# Patient Record
Sex: FEMALE | Race: WHITE | Employment: FULL TIME | URBAN - METROPOLITAN AREA
[De-identification: names, ages, dates, MRNs, and addresses within clinical notes are randomized per-mention and may not be internally consistent; named-entity substitution may affect disease eponyms.]

---

## 2018-01-05 ENCOUNTER — HOSPITAL ENCOUNTER (EMERGENCY)
Facility: HOSPITAL | Age: 78
Discharge: HOME | End: 2018-01-06
Payer: MEDICARE

## 2018-01-05 ENCOUNTER — APPOINTMENT (OUTPATIENT)
Dept: GENERAL RADIOLOGY | Facility: HOSPITAL | Age: 78
End: 2018-01-05
Payer: MEDICARE

## 2018-01-05 VITALS
RESPIRATION RATE: 18 BRPM | SYSTOLIC BLOOD PRESSURE: 109 MMHG | HEIGHT: 65 IN | BODY MASS INDEX: 23.49 KG/M2 | HEART RATE: 81 BPM | WEIGHT: 141 LBS | TEMPERATURE: 99 F | DIASTOLIC BLOOD PRESSURE: 61 MMHG | OXYGEN SATURATION: 99 %

## 2018-01-05 DIAGNOSIS — J11.1 INFLUENZA: Primary | ICD-10-CM

## 2018-01-05 LAB
ANION GAP SERPL CALC-SCNC: 10 MMOL/L (ref 0–18)
BASOPHILS # BLD: 0 K/UL (ref 0–0.2)
BASOPHILS NFR BLD: 0 %
BUN SERPL-MCNC: 20 MG/DL (ref 8–20)
BUN/CREAT SERPL: 21.3 (ref 10–20)
CALCIUM SERPL-MCNC: 8.9 MG/DL (ref 8.5–10.5)
CHLORIDE SERPL-SCNC: 105 MMOL/L (ref 95–110)
CO2 SERPL-SCNC: 21 MMOL/L (ref 22–32)
CREAT SERPL-MCNC: 0.94 MG/DL (ref 0.5–1.5)
EOSINOPHIL # BLD: 0 K/UL (ref 0–0.7)
EOSINOPHIL NFR BLD: 0 %
ERYTHROCYTE [DISTWIDTH] IN BLOOD BY AUTOMATED COUNT: 13.4 % (ref 11–15)
FLUAV + FLUBV RNA SPEC NAA+PROBE: NEGATIVE
FLUAV + FLUBV RNA SPEC NAA+PROBE: NEGATIVE
FLUAV + FLUBV RNA SPEC NAA+PROBE: POSITIVE
GLUCOSE SERPL-MCNC: 114 MG/DL (ref 70–99)
HCT VFR BLD AUTO: 36.3 % (ref 35–48)
HGB BLD-MCNC: 12.1 G/DL (ref 12–16)
LYMPHOCYTES # BLD: 0.9 K/UL (ref 1–4)
LYMPHOCYTES NFR BLD: 10 %
MCH RBC QN AUTO: 32.7 PG (ref 27–32)
MCHC RBC AUTO-ENTMCNC: 33.4 G/DL (ref 32–37)
MCV RBC AUTO: 97.7 FL (ref 80–100)
MONOCYTES # BLD: 0.9 K/UL (ref 0–1)
MONOCYTES NFR BLD: 10 %
NEUTROPHILS # BLD AUTO: 7.5 K/UL (ref 1.8–7.7)
NEUTROPHILS NFR BLD: 80 %
OSMOLALITY UR CALC.SUM OF ELEC: 285 MOSM/KG (ref 275–295)
PLATELET # BLD AUTO: 199 K/UL (ref 140–400)
PMV BLD AUTO: 7.2 FL (ref 7.4–10.3)
POTASSIUM SERPL-SCNC: 3.6 MMOL/L (ref 3.3–5.1)
RBC # BLD AUTO: 3.71 M/UL (ref 3.7–5.4)
SODIUM SERPL-SCNC: 136 MMOL/L (ref 136–144)
WBC # BLD AUTO: 9.3 K/UL (ref 4–11)

## 2018-01-05 PROCEDURE — 87631 RESP VIRUS 3-5 TARGETS: CPT | Performed by: EMERGENCY MEDICINE

## 2018-01-05 PROCEDURE — 96360 HYDRATION IV INFUSION INIT: CPT

## 2018-01-05 PROCEDURE — 99284 EMERGENCY DEPT VISIT MOD MDM: CPT

## 2018-01-05 PROCEDURE — 80048 BASIC METABOLIC PNL TOTAL CA: CPT | Performed by: EMERGENCY MEDICINE

## 2018-01-05 PROCEDURE — 71046 X-RAY EXAM CHEST 2 VIEWS: CPT

## 2018-01-05 PROCEDURE — 85025 COMPLETE CBC W/AUTO DIFF WBC: CPT | Performed by: EMERGENCY MEDICINE

## 2018-01-05 RX ORDER — ACETAMINOPHEN 500 MG
1000 TABLET ORAL ONCE
Status: COMPLETED | OUTPATIENT
Start: 2018-01-05 | End: 2018-01-05

## 2018-01-05 RX ORDER — ACETAMINOPHEN 500 MG
TABLET ORAL
Status: COMPLETED
Start: 2018-01-05 | End: 2018-01-05

## 2018-01-06 NOTE — ED PROVIDER NOTES
Patient Seen in: [facility] AND [facility] Emergency Department    History   Patient presents with:  Fever    Stated Complaint: Flu Like Symptoms    HPI    68 yo female with 10 days of URI symptoms with head congestion, sinus drainage and cough but no fever.  Ramakrishna Miranda sounds and intact distal pulses. Pulmonary/Chest: Effort normal and breath sounds normal.   Abdominal: Soft. Bowel sounds are normal. She exhibits no distension and no mass. There is no tenderness. There is no rebound and no guarding.    Musculoskeletal: radiology. No pneumonia. Labs reviewed. Influenza B positive. No treatment as patient has had symptoms for 10 days.          Disposition and Plan     Clinical Impression:  Influenza  (primary encounter diagnosis)    Disposition:  Discharge  1/6/2018 12:

## 2018-01-08 ENCOUNTER — OFFICE VISIT (OUTPATIENT)
Dept: INTERNAL MEDICINE CLINIC | Facility: CLINIC | Age: 78
End: 2018-01-08

## 2018-01-08 VITALS
TEMPERATURE: 99 F | RESPIRATION RATE: 18 BRPM | HEART RATE: 112 BPM | DIASTOLIC BLOOD PRESSURE: 82 MMHG | BODY MASS INDEX: 24.77 KG/M2 | WEIGHT: 146.88 LBS | HEIGHT: 64.5 IN | SYSTOLIC BLOOD PRESSURE: 136 MMHG

## 2018-01-08 DIAGNOSIS — J11.1 INFLUENZA: Primary | ICD-10-CM

## 2018-01-08 PROCEDURE — 99213 OFFICE O/P EST LOW 20 MIN: CPT | Performed by: INTERNAL MEDICINE

## 2018-01-08 PROCEDURE — G0463 HOSPITAL OUTPT CLINIC VISIT: HCPCS | Performed by: INTERNAL MEDICINE

## 2018-01-08 NOTE — PROGRESS NOTES
HPI:    Patient ID: Gerardo Huang is a 69year old female. HPI  Patient is here for follow-up on influenza. I have not seen her here for about 3 years. She was in the emergency room on January 5.   She had 10 days of upper respiratory cough and cold sym not bruise/bleed easily. Psychiatric/Behavioral: Negative for depressed mood. The patient is not nervous/anxious. Current Outpatient Prescriptions:  triamcinolone acetonide (KENALOG) 0.1 % Apply Externally Cream Apply  topically.  Disp:  Rfl: checked by nurse but back to normal when checked by physician.       Meds This Visit:  No prescriptions requested or ordered in this encounter    Imaging & Referrals:  None         GB#[id number]

## 2018-03-15 ENCOUNTER — APPOINTMENT (OUTPATIENT)
Dept: CT IMAGING | Facility: HOSPITAL | Age: 78
End: 2018-03-15
Payer: MEDICARE

## 2018-03-15 ENCOUNTER — APPOINTMENT (OUTPATIENT)
Dept: GENERAL RADIOLOGY | Facility: HOSPITAL | Age: 78
End: 2018-03-15
Payer: MEDICARE

## 2018-03-15 ENCOUNTER — HOSPITAL ENCOUNTER (EMERGENCY)
Facility: HOSPITAL | Age: 78
Discharge: HOME | End: 2018-03-15
Payer: MEDICARE

## 2018-03-15 VITALS
BODY MASS INDEX: 25 KG/M2 | OXYGEN SATURATION: 99 % | TEMPERATURE: 99 F | SYSTOLIC BLOOD PRESSURE: 144 MMHG | DIASTOLIC BLOOD PRESSURE: 93 MMHG | HEART RATE: 96 BPM | RESPIRATION RATE: 16 BRPM | WEIGHT: 146.81 LBS

## 2018-03-15 DIAGNOSIS — S02.2XXA CLOSED FRACTURE OF NASAL BONE, INITIAL ENCOUNTER: ICD-10-CM
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
## 2018-03-15 DIAGNOSIS — S09.90XA INJURY OF HEAD, INITIAL ENCOUNTER: Primary | ICD-10-CM

## 2018-03-15 PROCEDURE — 73110 X-RAY EXAM OF WRIST: CPT

## 2018-03-15 PROCEDURE — 70450 CT HEAD/BRAIN W/O DYE: CPT

## 2018-03-15 PROCEDURE — 99284 EMERGENCY DEPT VISIT MOD MDM: CPT

## 2018-03-15 PROCEDURE — 70486 CT MAXILLOFACIAL W/O DYE: CPT

## 2018-03-15 NOTE — ED INITIAL ASSESSMENT (HPI)
Pt here for eval s/p mechanical fall. Bruising to right eye, cheek. c/o right wrist pain. Denies loc, denies blood thinner use.

## 2018-03-16 NOTE — ED PROVIDER NOTES
Patient Seen in: [facility] AND [facility] Emergency Department    History   Patient presents with:  Head Neck Injury (neurologic, musculoskeletal)      HPI    Patient presents to the ED after mechanical fall.   She states that she tripped over a crack in her gar Device: None (Room air) [03/15/18 1814]    Physical Exam   Constitutional: She is oriented to person, place, and time. She appears well-developed and well-nourished. No distress.    HENT:   Nose: Nose normal.   Significant contusions, swelling and ecchymosi the anterior and posterior intracranial circulations. 4. Lesser incidental findings as above. Ct Facial Bones (cpt=70486)    Result Date: 3/15/2018  CONCLUSION:  1. Nondisplaced left nasal bone/nasal tip fracture.   2. Anterior periorbital soft tis fracture of nasal bone, initial encounter    Disposition:  Discharge    Follow-up:  Tracie Sharma MD  [address]  [phone]    Schedule an appointment as soon as possible for a visit in 3 days        Medications Prescribe

## 2018-03-16 NOTE — ED NOTES
PT safe to DC home per MD. Melinda Brar to dress self. DC teaching done, pt verbalizes understanding. Ambulatory with steady gait to exit with family.

## 2018-03-22 ENCOUNTER — OFFICE VISIT (OUTPATIENT)
Dept: INTERNAL MEDICINE CLINIC | Facility: CLINIC | Age: 78
End: 2018-03-22

## 2018-03-22 VITALS
HEIGHT: 64.5 IN | BODY MASS INDEX: 24.96 KG/M2 | WEIGHT: 148 LBS | DIASTOLIC BLOOD PRESSURE: 70 MMHG | HEART RATE: 97 BPM | SYSTOLIC BLOOD PRESSURE: 142 MMHG
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
## 2018-03-22 PROCEDURE — 99212 OFFICE O/P EST SF 10 MIN: CPT | Performed by: PHYSICIAN ASSISTANT

## 2018-03-22 PROCEDURE — G0463 HOSPITAL OUTPT CLINIC VISIT: HCPCS | Performed by: PHYSICIAN ASSISTANT

## 2018-03-22 NOTE — PROGRESS NOTES
HPI:    Patient ID: Alfreda Shane is a 69year old female. HPI   Patient presents for follow-up from emergency room. She was seen on 3/15/18 after sustaining a mechanical fall in her garage at home.   States that she tripped over a crack in the floor of Comment:Other reaction(s): Shakiness  Ciprofloxacin               Comment:Other reaction(s): Rash  Erythromycin                Comment:Other reaction(s): Shakiness  Sulfamethoxazole            Comment:Other reaction(s): lip and tongue swe

## 2018-04-26 ENCOUNTER — LAB ENCOUNTER (OUTPATIENT)
Dept: LAB | Facility: HOSPITAL | Age: 78
End: 2018-04-26
Attending: INTERNAL MEDICINE
Payer: MEDICARE

## 2018-04-26 ENCOUNTER — OFFICE VISIT (OUTPATIENT)
Dept: INTERNAL MEDICINE CLINIC | Facility: CLINIC | Age: 78
End: 2018-04-26

## 2018-04-26 VITALS
RESPIRATION RATE: 18 BRPM | SYSTOLIC BLOOD PRESSURE: 162 MMHG | HEART RATE: 90 BPM | TEMPERATURE: 98 F | WEIGHT: 145.38 LBS | DIASTOLIC BLOOD PRESSURE: 96 MMHG | HEIGHT: 64 IN | BODY MASS INDEX: 24.82 KG/M2

## 2018-04-26 DIAGNOSIS — R32 URINARY INCONTINENCE, UNSPECIFIED TYPE: ICD-10-CM

## 2018-04-26 DIAGNOSIS — R73.09 ELEVATED GLUCOSE: ICD-10-CM

## 2018-04-26 DIAGNOSIS — I10 ESSENTIAL HYPERTENSION: ICD-10-CM

## 2018-04-26 DIAGNOSIS — Z00.00 ENCOUNTER FOR ANNUAL HEALTH EXAMINATION: Primary | ICD-10-CM

## 2018-04-26 DIAGNOSIS — Z12.31 ENCOUNTER FOR SCREENING MAMMOGRAM FOR BREAST CANCER: ICD-10-CM

## 2018-04-26 DIAGNOSIS — Z23 NEED FOR VACCINATION: ICD-10-CM

## 2018-04-26 DIAGNOSIS — Z78.0 POST-MENOPAUSE: ICD-10-CM

## 2018-04-26 PROCEDURE — G0009 ADMIN PNEUMOCOCCAL VACCINE: HCPCS | Performed by: INTERNAL MEDICINE

## 2018-04-26 PROCEDURE — 84443 ASSAY THYROID STIM HORMONE: CPT

## 2018-04-26 PROCEDURE — 82607 VITAMIN B-12: CPT

## 2018-04-26 PROCEDURE — 80053 COMPREHEN METABOLIC PANEL: CPT

## 2018-04-26 PROCEDURE — 85025 COMPLETE CBC W/AUTO DIFF WBC: CPT

## 2018-04-26 PROCEDURE — G0438 PPPS, INITIAL VISIT: HCPCS | Performed by: INTERNAL MEDICINE

## 2018-04-26 PROCEDURE — 36415 COLL VENOUS BLD VENIPUNCTURE: CPT

## 2018-04-26 PROCEDURE — 80061 LIPID PANEL: CPT

## 2018-04-26 PROCEDURE — 90670 PCV13 VACCINE IM: CPT | Performed by: INTERNAL MEDICINE

## 2018-04-26 NOTE — PROGRESS NOTES
HPI:   Griselda Quezada is a 69year old female who presents for a Medicare Initial Annual Wellness visit (Once after 12 month Medicare anniversary) . Patient is here requesting Medicare annual wellness visit and follow-up on chronic medical issues.   Over the LAST 2 WEEKS   Little interest or pleasure in doing things (over the last two weeks)?: Not at all  Feeling down, depressed, or hopeless (over the last two weeks)?: Not at all  PHQ-2 SCORE: 0     Advanced Directive:   She does NOT have a Living Will on (urinary tract infection) (2011). She  has a past surgical history that includes appendectomy (1994); hysterectomy (1994); and oophorectomy (1994). Her family history includes Diabetes in her father.    SOCIAL HISTORY:   She  reports that she has neve hearing conversations in a noisy background such as a crowded room or restaurant:  No   I get confused about where sounds come from:  No I misunderstand some words in a sentence and need to ask people to repeat themselves:  No   I especially have trouble u warm and dry. No rash noted. Psychiatric: She has a normal mood and affect. Her behavior is normal. Judgment and thought content normal.        Vaccination History     There is no immunization history on file for this patient.      ASSESSMENT AND OTHER RE elevated. (Z23) Need for vaccination  Plan: PNEUMOCOCCAL VACC, 13 KENDRICK IM         Vaccination status reviewed. Given Prevnar 13. We will give the Pneumovax shot in 1 year. She can get the shingles shot at the pharmacy.         Diet assessment: good found.    Bone Density Screening      Dexascan Every two years No results found for this or any previous visit. No flowsheet data found.     Pap and Pelvic      Pap: Every 3 yrs age 21-65 or Pap+HPV every 5 yrs age 33-67, age 72 and older at high risk There

## 2018-04-26 NOTE — PATIENT INSTRUCTIONS
Shiv Frausto's SCREENING SCHEDULE   Tests on this list are recommended by your physician but may not be covered, or covered at this frequency, by your insurer. Please check with your insurance carrier before scheduling to verify coverage.    PREVENTATIVE Flex Sigmoidoscopy Screen  Covered every 5 years No results found for this or any previous visit. No flowsheet data found. Fecal Occult Blood   Covered Annually No results found for: FOB, OCCULTSTOOL No flowsheet data found.      Barium Enema-   uncomfo after your 65th birthday    Hepatitis B for Moderate/High Risk       No orders found for this or any previous visit.  Medium/high risk factors:   End-stage renal disease   Hemophiliacs who received Factor VIII or IX concentrates   Clients of institutions fo

## 2018-05-10 ENCOUNTER — OFFICE VISIT (OUTPATIENT)
Dept: OPTOMETRY | Facility: CLINIC | Age: 78
End: 2018-05-10

## 2018-05-10 DIAGNOSIS — H52.4 PRESBYOPIA: ICD-10-CM

## 2018-05-10 DIAGNOSIS — H25.13 AGE-RELATED NUCLEAR CATARACT OF BOTH EYES: Primary | ICD-10-CM

## 2018-05-10 PROCEDURE — 92004 COMPRE OPH EXAM NEW PT 1/>: CPT | Performed by: OPTOMETRIST

## 2018-05-10 NOTE — PROGRESS NOTES
Arnold Dewey is a 69year old female. HPI:     HPI     Patient is in for an annual eye exam. Patient wears only OTC reading glasses and wants to make sure she is going to pass her test at the [facility] without distance glasses.  Used to wear gas permeable cont (Snellen - Linear)       Right Left    Dist sc 20/30 20/40    Near cc 4pt 4pt    Correction:  Glasses          Tonometry (Non-contact air puff, 2:50 PM)       Right Left    Pressure 16 18          Pupils       Pupils    Right PERRL    Left PERRL          V this encounter     Follow up instructions:  Return in about 1 year (around 5/10/2019) for Cataract Evaluation, Dilated exam.    5/10/2018  Scribed by: Trevor Bee

## 2018-05-10 NOTE — PATIENT INSTRUCTIONS
Age-related nuclear cataract of both eyes  No treatment is required. Will continue to observe. Presbyopia  Patient can still continue with +2.50 OTC reading glasses. No distance RX is needed.

## 2018-05-11 ENCOUNTER — APPOINTMENT (OUTPATIENT)
Dept: CT IMAGING | Facility: HOSPITAL | Age: 78
End: 2018-05-11
Attending: EMERGENCY MEDICINE
Payer: MEDICARE

## 2018-05-11 ENCOUNTER — HOSPITAL ENCOUNTER (EMERGENCY)
Facility: HOSPITAL | Age: 78
Discharge: HOME | End: 2018-05-11
Attending: EMERGENCY MEDICINE
Payer: MEDICARE

## 2018-05-11 VITALS
TEMPERATURE: 98 F | OXYGEN SATURATION: 99 % | HEIGHT: 64 IN | HEART RATE: 90 BPM | SYSTOLIC BLOOD PRESSURE: 153 MMHG | WEIGHT: 145 LBS | RESPIRATION RATE: 20 BRPM | DIASTOLIC BLOOD PRESSURE: 86 MMHG | BODY MASS INDEX: 24.75 KG/M2

## 2018-05-11 DIAGNOSIS — S00.81XA ABRASION OF FACE, INITIAL ENCOUNTER: ICD-10-CM

## 2018-05-11 DIAGNOSIS — S00.11XA CONTUSION OF RIGHT EYELID, INITIAL ENCOUNTER: Primary | ICD-10-CM

## 2018-05-11 DIAGNOSIS — S01.511A LIP LACERATION, INITIAL ENCOUNTER: ICD-10-CM

## 2018-05-11 PROCEDURE — 70450 CT HEAD/BRAIN W/O DYE: CPT | Performed by: EMERGENCY MEDICINE

## 2018-05-11 PROCEDURE — 99284 EMERGENCY DEPT VISIT MOD MDM: CPT

## 2018-05-11 PROCEDURE — 90471 IMMUNIZATION ADMIN: CPT

## 2018-05-11 NOTE — ED INITIAL ASSESSMENT (HPI)
Pt reports lifting boxes around the garage and fell and hit her nose/face. Lacerations noted to forehead and nose, bleeding controlled. Denies vision changes, neck pain, headaches, or blood thinner use.

## 2018-05-11 NOTE — ED PROVIDER NOTES
Patient Seen in: [facility] AND [facility] Emergency Department    History   Patient presents with:  Fall (musculoskeletal, neurologic)  Laceration Abrasion (integumentary)    Stated Complaint: fall, lac to face    HPI    Patient here with complaint of head inju PULSE OX Nl on room air    General: sts around r eye, superficial abrasions to r eyebrow, aabrasion just below nose small 1 cm, mid upper inner lip with 4 mm superficial laceration, no dental injury or laxity, no misalignment  Head: see above evidence of AM

## 2018-05-11 NOTE — ED NOTES
Report from [facility], pt stable in room during time of report then taken to CT.  Will continue to monitor upon return

## 2018-05-17 ENCOUNTER — HOSPITAL ENCOUNTER (OUTPATIENT)
Dept: MAMMOGRAPHY | Facility: HOSPITAL | Age: 78
Discharge: HOME | End: 2018-05-17
Attending: INTERNAL MEDICINE
Payer: MEDICARE

## 2018-05-17 ENCOUNTER — HOSPITAL ENCOUNTER (OUTPATIENT)
Dept: BONE DENSITY | Facility: HOSPITAL | Age: 78
Discharge: HOME | End: 2018-05-17
Attending: INTERNAL MEDICINE
Payer: MEDICARE

## 2018-05-17 DIAGNOSIS — Z12.31 ENCOUNTER FOR SCREENING MAMMOGRAM FOR BREAST CANCER: ICD-10-CM

## 2018-05-17 DIAGNOSIS — Z78.0 POST-MENOPAUSE: ICD-10-CM

## 2018-05-17 PROCEDURE — 77080 DXA BONE DENSITY AXIAL: CPT | Performed by: INTERNAL MEDICINE

## 2018-05-17 PROCEDURE — 77067 SCR MAMMO BI INCL CAD: CPT | Performed by: INTERNAL MEDICINE

## 2018-05-24 ENCOUNTER — OFFICE VISIT (OUTPATIENT)
Dept: PHYSICAL THERAPY | Facility: HOSPITAL | Age: 78
End: 2018-05-24
Attending: INTERNAL MEDICINE
Payer: MEDICARE

## 2018-05-24 DIAGNOSIS — R32 URINARY INCONTINENCE, UNSPECIFIED TYPE: ICD-10-CM

## 2018-05-24 PROCEDURE — 97140 MANUAL THERAPY 1/> REGIONS: CPT

## 2018-05-24 PROCEDURE — 97535 SELF CARE MNGMENT TRAINING: CPT

## 2018-05-24 PROCEDURE — 97161 PT EVAL LOW COMPLEX 20 MIN: CPT

## 2018-05-24 NOTE — PROGRESS NOTES
MUSCULOSKELETAL AND PELVIC FLOOR EVALUATION:   Referring Physician: Dr. Belle Gupta  Diagnosis: Urinary incontinence, unspecified type (R32)  Date of Onset: many years  Date of Service: 5/24/2018     PATIENT SUMMARY   Nalini Gray is a 69year old y/o femal getting in/out of car/chair/bed if urge is present. Denies any bowel dysfunction nor perineal pain. States she has never been sexually active, but has never had pain with an OBGYN exam. Reports she is wearing 2 pads/day, and wearing them 24/7.  Reports leak Smoker no    Drinker no    Diabetes no    Hypertension/Hyperlipidemia/Hypercholesterinemia no    Thyroid no      OBSTETRIC/GYNECOLOGIC HISTORY  : 0  Para: 0  Hx of hysterectomy (oophorectomy)   Menarche: 14  Last menstrual period:      URI Abdominals: NT/5       FLEXIBILITY/PALPATION  Muscle Left Right Comments   Iliopsoas WNL WNL    Hamstrings Mild restriction Mild restriction    Piriformis WNL WNL    Adductors WNL WNL    Scars Location/Surgery: N/A     Other   N/A     SPECIAL TESTS  All See below     Today’s Treatment and Response:  Patient education provided on female pelvic floor anatomy via pelvic floor model/illustrations, bowel/bladder/sexual function/health, adequate hydration levels, proper toileting habits/posture, bladder normati DPT    [de-identified] certification required: Yes  I certify the need for these services furnished under this plan of treatment and while under my care.     X___________________________________________________ Date____________________

## 2018-06-07 ENCOUNTER — OFFICE VISIT (OUTPATIENT)
Dept: PHYSICAL THERAPY | Facility: HOSPITAL | Age: 78
End: 2018-06-07
Attending: INTERNAL MEDICINE
Payer: MEDICARE

## 2018-06-07 ENCOUNTER — OFFICE VISIT (OUTPATIENT)
Dept: INTERNAL MEDICINE CLINIC | Facility: CLINIC | Age: 78
End: 2018-06-07

## 2018-06-07 VITALS
SYSTOLIC BLOOD PRESSURE: 148 MMHG | RESPIRATION RATE: 18 BRPM | BODY MASS INDEX: 24.82 KG/M2 | WEIGHT: 145.38 LBS | TEMPERATURE: 98 F | HEART RATE: 86 BPM | DIASTOLIC BLOOD PRESSURE: 84 MMHG | HEIGHT: 64 IN

## 2018-06-07 DIAGNOSIS — M85.80 OSTEOPENIA, UNSPECIFIED LOCATION: ICD-10-CM
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
## 2018-06-07 DIAGNOSIS — I10 ESSENTIAL HYPERTENSION: Primary | ICD-10-CM

## 2018-06-07 DIAGNOSIS — R32 URINARY INCONTINENCE, UNSPECIFIED TYPE: ICD-10-CM

## 2018-06-07 PROCEDURE — G0463 HOSPITAL OUTPT CLINIC VISIT: HCPCS | Performed by: INTERNAL MEDICINE

## 2018-06-07 PROCEDURE — 99214 OFFICE O/P EST MOD 30 MIN: CPT | Performed by: INTERNAL MEDICINE

## 2018-06-07 PROCEDURE — 97535 SELF CARE MNGMENT TRAINING: CPT

## 2018-06-07 PROCEDURE — 97112 NEUROMUSCULAR REEDUCATION: CPT

## 2018-06-07 NOTE — PROGRESS NOTES
Dx: Urinary incontinence, unspecified type (R32)        Authorized # of Visits/Insurance:  Medicare  Script Dates:  5/24/18 - 7/19/18           Next MD visit: none scheduled  Referring MD: Mickie Obrien  Fall Risk:  standard         Precautions: n/a 40 min  Therapist: Esau Monday PT, DPT    Long Term Goals Timeframe (8 weeks, 5/24/18 - 7/19/18)  1. Patient to be independent with progressive HEP during and upon discharge to aide with symptom management and return to previous level of function.    2.  P

## 2018-06-07 NOTE — PROGRESS NOTES
HPI:    Patient ID: Ramón Ray is a 69year old female. HPI  Patient is here for follow-up on elevated blood pressure as well as urinary incontinence and osteopenia. I last saw her on April 26. That was for Medicare annual wellness visit.   Mammogram Cardiovascular: Negative for chest pain and palpitations. Gastrointestinal: Negative for nausea, vomiting, abdominal pain, diarrhea and constipation.    Genitourinary: Negative for dysuria, hematuria, vaginal discharge, menstrual problem and sexual dys Essential hypertension  (primary encounter diagnosis)  Plan: Blood pressure is a little bit borderline in the office. Very well controlled at home. Appears to have element of whitecoat hypertension. She does have some chronic underlying anxiety.   We mónica

## 2018-06-14 ENCOUNTER — OFFICE VISIT (OUTPATIENT)
Dept: PHYSICAL THERAPY | Facility: HOSPITAL | Age: 78
End: 2018-06-14
Attending: INTERNAL MEDICINE
Payer: MEDICARE

## 2018-06-14 DIAGNOSIS — R32 URINARY INCONTINENCE, UNSPECIFIED TYPE: ICD-10-CM

## 2018-06-14 PROCEDURE — 97112 NEUROMUSCULAR REEDUCATION: CPT

## 2018-06-14 NOTE — PROGRESS NOTES
Dx: Urinary incontinence, unspecified type (R32)        Authorized # of Visits/Insurance:  Medicare  Script Dates:  5/24/18 - 7/19/18           Next MD visit: none scheduled  Referring MD: Bakari Elena  Fall Risk:  standard         Precautions: n/a incorporate a pelvic floor contraction with all sit to stand transitions to reduce pressure and improve stability; pt verbally/visually demonstrated understanding. Progressed PF/hip co-activation to standing with pt tolerating well and no fatigue reported.

## 2018-06-21 ENCOUNTER — OFFICE VISIT (OUTPATIENT)
Dept: PHYSICAL THERAPY | Facility: HOSPITAL | Age: 78
End: 2018-06-21
Attending: INTERNAL MEDICINE
Payer: MEDICARE

## 2018-06-21 DIAGNOSIS — R32 URINARY INCONTINENCE, UNSPECIFIED TYPE: ICD-10-CM

## 2018-06-21 PROCEDURE — 97112 NEUROMUSCULAR REEDUCATION: CPT

## 2018-06-21 NOTE — PROGRESS NOTES
Dx: Urinary incontinence, unspecified type (R32)        Authorized # of Visits/Insurance:  Medicare  Script Dates:  5/24/18 - 7/19/18           Next MD visit: none scheduled  Referring MD: Levon De La Rosa  Fall Risk:  standard         Precautions: n/a SCS=strain-counterstrain  Assessment: Progressed PF/hip co-activation to standing with pt tolerating well and minimal fatigue reported throughout. Provided intermittent verbal/visual cueing to ensure proper co-activation/muscle isolation.  Reviewed PF con

## 2018-06-28 ENCOUNTER — OFFICE VISIT (OUTPATIENT)
Dept: PHYSICAL THERAPY | Facility: HOSPITAL | Age: 78
End: 2018-06-28
Attending: INTERNAL MEDICINE
Payer: MEDICARE

## 2018-06-28 DIAGNOSIS — R32 URINARY INCONTINENCE, UNSPECIFIED TYPE: ICD-10-CM

## 2018-06-28 PROCEDURE — 97112 NEUROMUSCULAR REEDUCATION: CPT

## 2018-06-28 NOTE — PROGRESS NOTES
Dx: Urinary incontinence, unspecified type (R32)        Authorized # of Visits/Insurance:  Medicare  Script Dates:  5/24/18 - 7/19/18           Next MD visit: none scheduled  Referring MD: Fuad Santiago  Fall Risk:  standard         Precautions: n/a walks+PF x4 laps  Standing 3 way lunge (modified)+PF x5 ea  PF+toe taps: forward/lateral x5 ea 6\"  PF+FSU/LSU 6\" x5 ea  Tandem Balance: foam 30\"x2 ea  SLS: foam 15\"x2 ea  Mini squat with PF activation with return to stand x10     Therapeutic Activity/S

## 2018-07-05 ENCOUNTER — OFFICE VISIT (OUTPATIENT)
Dept: PHYSICAL THERAPY | Facility: HOSPITAL | Age: 78
End: 2018-07-05
Attending: INTERNAL MEDICINE
Payer: MEDICARE

## 2018-07-05 DIAGNOSIS — R32 URINARY INCONTINENCE, UNSPECIFIED TYPE: ICD-10-CM

## 2018-07-05 PROCEDURE — 97112 NEUROMUSCULAR REEDUCATION: CPT

## 2018-07-05 NOTE — PROGRESS NOTES
Dx: Urinary incontinence, unspecified type (R32)        Authorized # of Visits/Insurance:  Medicare  Script Dates:  5/24/18 - 7/19/18           Next MD visit: none scheduled  Referring MD: Latisha Boyer  Fall Risk:  standard         Precautions: n/a x5 ea   Standing hip 3-way+PF x5 ea  Standing toe taps: 6\" x5 ea; forward/lateral  FSU/LSU 6\"+PF x5 ea  Lateral walks+PF x4 laps  Standing mini squat+PF x10  Review of PF activation with external palpation  SCS: B pubo/ileococcygeus 20\"x2 ea    Reviewed infection. 4. Pt to report reduction in nocturia to 2x/night to improve sleep. Patient Goal: \"reducing leakage, not have to wear pads as much. \"

## 2018-07-12 ENCOUNTER — OFFICE VISIT (OUTPATIENT)
Dept: PHYSICAL THERAPY | Facility: HOSPITAL | Age: 78
End: 2018-07-12
Attending: INTERNAL MEDICINE
Payer: MEDICARE

## 2018-07-12 DIAGNOSIS — R32 URINARY INCONTINENCE, UNSPECIFIED TYPE: ICD-10-CM

## 2018-07-12 PROCEDURE — 97140 MANUAL THERAPY 1/> REGIONS: CPT

## 2018-07-12 PROCEDURE — 97112 NEUROMUSCULAR REEDUCATION: CPT

## 2018-07-12 NOTE — PROGRESS NOTES
Dx: Urinary incontinence, unspecified type (R32)        Authorized # of Visits/Insurance:  Medicare  Script Dates:  5/24/18 - 7/19/18           Next MD visit: none scheduled  Referring MD: Petrona Conte  Fall Risk:  standard         Precautions: n/a x10  Review of PF activation with external palpation  SCS: B pubo/ileococcygeus 20\"x2 ea    Reviewed of PF act with external palpation  Lateral walks+PF x4 laps  Standing 3 way lunge (modified)+PF x5 ea  PF+toe taps: forward/lateral x5 ea 6\"  PF+FSU/LSU reduction in nocturia to 2x/night to improve sleep. Patient Goal: \"reducing leakage, not have to wear pads as much. \"

## 2018-07-19 ENCOUNTER — OFFICE VISIT (OUTPATIENT)
Dept: PHYSICAL THERAPY | Facility: HOSPITAL | Age: 78
End: 2018-07-19
Attending: INTERNAL MEDICINE
Payer: MEDICARE

## 2018-07-19 DIAGNOSIS — R32 URINARY INCONTINENCE, UNSPECIFIED TYPE: ICD-10-CM

## 2018-07-19 PROCEDURE — 97140 MANUAL THERAPY 1/> REGIONS: CPT

## 2018-07-19 NOTE — PROGRESS NOTES
Patient Name: Janny Paz  YOB: 1940          MRN number:  [id number]  Date:  7/19/2018  Referring Physician:  Bailey Martinez  Dx: Urinary incontinence, unspecified type (R32)          Authorized # of Visits/Insurance:  [employer]  Script Da Device: none  Deviations: WNL  Physical Assistance Needed: none    POSTURE  Increased thoracic kyphosis, notable scoliosis    RANGE OF MOTION  Lumbar AROM/PROM screen: WNL  Hip AROM/PROM screen: WNL    STRENGTH (MMT TESTING)  Hip: Left Right Comments   Fle Iliococcygeus WNL WNL    Coccygeus WNL WNL    Piriformis WNL WNL    Obturator internus WNL WNL      Sensation  Verbalized temp difference of gel: yes, vaginal     Treatment Rendered Today   7/19/2018  Visit: 8/10 Notes/Precautions:    HEP  5/24: Female p

## 2018-12-06 ENCOUNTER — OFFICE VISIT (OUTPATIENT)
Dept: INTERNAL MEDICINE CLINIC | Facility: CLINIC | Age: 78
End: 2018-12-06
Payer: MEDICARE

## 2018-12-06 VITALS
RESPIRATION RATE: 18 BRPM | TEMPERATURE: 98 F | HEART RATE: 102 BPM | DIASTOLIC BLOOD PRESSURE: 92 MMHG | SYSTOLIC BLOOD PRESSURE: 150 MMHG | HEIGHT: 64 IN | BODY MASS INDEX: 25.29 KG/M2 | WEIGHT: 148.13 LBS

## 2018-12-06 DIAGNOSIS — R32 URINARY INCONTINENCE, UNSPECIFIED TYPE: ICD-10-CM

## 2018-12-06 DIAGNOSIS — M85.80 OSTEOPENIA, UNSPECIFIED LOCATION: ICD-10-CM

## 2018-12-06 DIAGNOSIS — F41.9 ANXIETY: ICD-10-CM

## 2018-12-06 DIAGNOSIS — I10 ESSENTIAL HYPERTENSION: Primary | ICD-10-CM

## 2018-12-06 PROCEDURE — 99214 OFFICE O/P EST MOD 30 MIN: CPT | Performed by: INTERNAL MEDICINE

## 2018-12-06 PROCEDURE — G0463 HOSPITAL OUTPT CLINIC VISIT: HCPCS | Performed by: INTERNAL MEDICINE

## 2018-12-06 RX ORDER — OMEGA-3S/DHA/EPA/FISH OIL/D3 300MG-1000
CAPSULE ORAL
COMMUNITY

## 2018-12-06 RX ORDER — METOPROLOL SUCCINATE 25 MG/1
25 TABLET, EXTENDED RELEASE ORAL DAILY
Qty: 30 TABLET | Refills: 5 | Status: SHIPPED | OUTPATIENT
Start: 2018-12-06 | End: 2019-01-17

## 2018-12-06 NOTE — PROGRESS NOTES
HPI:    Patient ID: Mauro Bermudez is a 69year old female. HPI  Patient is here for follow-up on chronic medical issues as listed below. Last seen here in June. Had mild elevation of blood pressure at that time but we did not start any treatment.   She for cough and shortness of breath. Cardiovascular: Negative for chest pain and palpitations. Gastrointestinal: Negative for nausea, vomiting, abdominal pain, diarrhea and constipation.    Genitourinary: Negative for dysuria, hematuria, vaginal discharg She has no cervical adenopathy. Neurological: She is alert. Skin: Skin is warm and dry. No rash noted. Psychiatric: She has a normal mood and affect. Thought content normal.              ASSESSMENT/PLAN:   1.  Essential hypertension  Blood pressure is

## 2019-01-17 ENCOUNTER — OFFICE VISIT (OUTPATIENT)
Dept: INTERNAL MEDICINE CLINIC | Facility: CLINIC | Age: 79
End: 2019-01-17
Payer: MEDICARE

## 2019-01-17 VITALS
HEIGHT: 64 IN | DIASTOLIC BLOOD PRESSURE: 86 MMHG | TEMPERATURE: 98 F | WEIGHT: 149.69 LBS | BODY MASS INDEX: 25.56 KG/M2 | HEART RATE: 80 BPM | SYSTOLIC BLOOD PRESSURE: 148 MMHG | RESPIRATION RATE: 18 BRPM

## 2019-01-17 DIAGNOSIS — R19.5 LOOSE BOWEL MOVEMENTS: ICD-10-CM

## 2019-01-17 DIAGNOSIS — I10 ESSENTIAL HYPERTENSION: Primary | ICD-10-CM

## 2019-01-17 DIAGNOSIS — F41.9 ANXIETY: ICD-10-CM

## 2019-01-17 PROCEDURE — 99214 OFFICE O/P EST MOD 30 MIN: CPT | Performed by: INTERNAL MEDICINE

## 2019-01-17 PROCEDURE — G0463 HOSPITAL OUTPT CLINIC VISIT: HCPCS | Performed by: INTERNAL MEDICINE

## 2019-01-17 RX ORDER — METOPROLOL SUCCINATE 50 MG/1
50 TABLET, EXTENDED RELEASE ORAL DAILY
Qty: 30 TABLET | Refills: 5 | Status: SHIPPED | OUTPATIENT
Start: 2019-01-17 | End: 2019-08-01

## 2019-01-17 NOTE — PROGRESS NOTES
HPI:    Patient ID: Keron Luciano is a 69year old female.     HPI  Patient returns to the office today to discuss chronic medical issues which include Patient Active Problem List:     Age-related nuclear cataract of both eyes     Presbyopia     Urinary inco use: No             Review of Systems   Constitutional: Negative for chills, fatigue and fever. HENT: Negative for hearing loss. Eyes: Negative for visual disturbance. Respiratory: Negative for cough and shortness of breath.     Cardiovascular: Negat normal heart sounds and intact distal pulses. Edema not present. Pulmonary/Chest: Effort normal and breath sounds normal. She has no wheezes. She has no rales. Abdominal: Soft. Bowel sounds are normal. She exhibits no mass. There is no tenderness.

## 2019-04-25 ENCOUNTER — HOSPITAL ENCOUNTER (OUTPATIENT)
Dept: GENERAL RADIOLOGY | Facility: HOSPITAL | Age: 79
Discharge: HOME | End: 2019-04-25
Attending: INTERNAL MEDICINE
Payer: MEDICARE

## 2019-04-25 ENCOUNTER — LAB ENCOUNTER (OUTPATIENT)
Dept: LAB | Facility: HOSPITAL | Age: 79
End: 2019-04-25
Attending: INTERNAL MEDICINE
Payer: MEDICARE

## 2019-04-25 ENCOUNTER — OFFICE VISIT (OUTPATIENT)
Dept: INTERNAL MEDICINE CLINIC | Facility: CLINIC | Age: 79
End: 2019-04-25
Payer: MEDICARE

## 2019-04-25 VITALS
WEIGHT: 146.81 LBS | HEIGHT: 64 IN | TEMPERATURE: 98 F | DIASTOLIC BLOOD PRESSURE: 80 MMHG | SYSTOLIC BLOOD PRESSURE: 146 MMHG | BODY MASS INDEX: 25.06 KG/M2 | RESPIRATION RATE: 18 BRPM | HEART RATE: 74 BPM

## 2019-04-25 DIAGNOSIS — M25.551 RIGHT HIP PAIN: ICD-10-CM

## 2019-04-25 DIAGNOSIS — Z00.00 ENCOUNTER FOR ANNUAL HEALTH EXAMINATION: Primary | ICD-10-CM

## 2019-04-25 DIAGNOSIS — Z12.31 ENCOUNTER FOR SCREENING MAMMOGRAM FOR BREAST CANCER: ICD-10-CM

## 2019-04-25 DIAGNOSIS — I10 ESSENTIAL HYPERTENSION: ICD-10-CM

## 2019-04-25 DIAGNOSIS — Z23 NEED FOR VACCINATION: ICD-10-CM

## 2019-04-25 PROCEDURE — G0439 PPPS, SUBSEQ VISIT: HCPCS | Performed by: INTERNAL MEDICINE

## 2019-04-25 PROCEDURE — G0009 ADMIN PNEUMOCOCCAL VACCINE: HCPCS | Performed by: INTERNAL MEDICINE

## 2019-04-25 PROCEDURE — 36415 COLL VENOUS BLD VENIPUNCTURE: CPT

## 2019-04-25 PROCEDURE — 73502 X-RAY EXAM HIP UNI 2-3 VIEWS: CPT | Performed by: INTERNAL MEDICINE

## 2019-04-25 PROCEDURE — 82607 VITAMIN B-12: CPT

## 2019-04-25 PROCEDURE — 84443 ASSAY THYROID STIM HORMONE: CPT

## 2019-04-25 PROCEDURE — G0463 HOSPITAL OUTPT CLINIC VISIT: HCPCS | Performed by: INTERNAL MEDICINE

## 2019-04-25 PROCEDURE — 80053 COMPREHEN METABOLIC PANEL: CPT

## 2019-04-25 PROCEDURE — 80061 LIPID PANEL: CPT

## 2019-04-25 PROCEDURE — 85025 COMPLETE CBC W/AUTO DIFF WBC: CPT

## 2019-04-25 PROCEDURE — 90732 PPSV23 VACC 2 YRS+ SUBQ/IM: CPT | Performed by: INTERNAL MEDICINE

## 2019-04-25 NOTE — PATIENT INSTRUCTIONS
Wali Frausot's SCREENING SCHEDULE   Tests on this list are recommended by your physician but may not be covered, or covered at this frequency, by your insurer. Please check with your insurance carrier before scheduling to verify coverage.    PREVENTATIVE abnormal There are no preventive care reminders to display for this patient. Update Health Maintenance if applicable    Flex Sigmoidoscopy Screen  Covered every 5 years No results found for this or any previous visit. No flowsheet data found.      Fecal Occ VACC, 13 KENDRICK IM    Please get once after your 65th birthday    Pneumococcal 23 (Pneumovax)  Covered Once after 65 No orders found for this or any previous visit.  Please get once after your 65th birthday    Hepatitis B for Moderate/High Risk       No orders

## 2019-04-25 NOTE — PROGRESS NOTES
HPI:   Astrid Wiggins is a 69year old female who presents for a Medicare Subsequent Annual Wellness visit (Pt already had Initial Annual Wellness).     Patient is here for Medicare annual wellness visit and follow-up on medical issues including hypertensio  for [facility] on file in [address]    Advance care planning including the explanation and discussion of advance directives standard forms performed Face to Face with patient and Family/surrogate (if present), and forms available to patient in AVS as needed. loratadine (CLARITIN) 10 MG Oral Tab Take  by mouth. MEDICAL INFORMATION:   She  has a past medical history of Fibroids, Trigger index finger of right hand (2010), and UTI (urinary tract infection) (2011).     She  has a past surgical hist or more people are talking at the same time:  No   I have trouble understanding things on the TV:  No I have to strain to understand conversations:  No   I have to worry about missing the telephone ring or doorbell:  No I have trouble hearing conversations nipple, no mass, no nipple discharge, no skin change and no tenderness. Left breast exhibits no inverted nipple, no mass, no nipple discharge, no skin change and no tenderness. Abdominal: Soft. Bowel sounds are normal. She exhibits no mass.  There is no t xray      Diet assessment: good     PLAN:  The patient indicates understanding of these issues and agrees to the plan. Reinforced healthy diet, lifestyle, and exercise. No follow-ups on file.      Bailey Martinez MD, 4/25/2019     General Health     In and older at high risk There are no preventive care reminders to display for this patient. Update Health Maintenance if applicable    Chlamydia  Annually if high risk No results found for: CHLAMYDIA No flowsheet data found.     Screening Mammogram      Mamm

## 2019-05-07 ENCOUNTER — NURSE TRIAGE (OUTPATIENT)
Dept: INTERNAL MEDICINE CLINIC | Facility: CLINIC | Age: 79
End: 2019-05-07

## 2019-05-07 NOTE — TELEPHONE ENCOUNTER
Advised patient of Dr Fredy Guerrero  note. Patient verbalized understanding and had no further questions.

## 2019-05-08 ENCOUNTER — OFFICE VISIT (OUTPATIENT)
Dept: INTERNAL MEDICINE CLINIC | Facility: CLINIC | Age: 79
End: 2019-05-08
Payer: MEDICARE

## 2019-05-08 VITALS
HEIGHT: 64 IN | WEIGHT: 150 LBS | TEMPERATURE: 98 F | DIASTOLIC BLOOD PRESSURE: 82 MMHG | SYSTOLIC BLOOD PRESSURE: 144 MMHG | BODY MASS INDEX: 25.61 KG/M2 | HEART RATE: 78 BPM

## 2019-05-08 DIAGNOSIS — R30.0 BURNING WITH URINATION: Primary | ICD-10-CM

## 2019-05-08 PROCEDURE — 81002 URINALYSIS NONAUTO W/O SCOPE: CPT | Performed by: INTERNAL MEDICINE

## 2019-05-08 PROCEDURE — 99213 OFFICE O/P EST LOW 20 MIN: CPT | Performed by: INTERNAL MEDICINE

## 2019-05-08 PROCEDURE — G0463 HOSPITAL OUTPT CLINIC VISIT: HCPCS | Performed by: INTERNAL MEDICINE

## 2019-05-08 RX ORDER — CEPHALEXIN 500 MG/1
500 CAPSULE ORAL 3 TIMES DAILY
Qty: 15 CAPSULE | Refills: 0 | Status: SHIPPED | OUTPATIENT
Start: 2019-05-08 | End: 2019-05-13

## 2019-05-08 NOTE — TELEPHONE ENCOUNTER
Pt states she was advised to go to [facility] yesterday for UTI symptoms and the [facility] [employer] has closed. Pt states symptoms have worsened slightly and she would like to schedule appt today. Appt scheduled with Dr. Bashir Billings 12:10 today.

## 2019-05-08 NOTE — PATIENT INSTRUCTIONS
Await results of urine culture. Please take cephalexin 500 mg 3 times daily for 5 days. Call if no better.

## 2019-05-08 NOTE — PROGRESS NOTES
Man Webber is a 69year old female. Patient presents with:  Burning On Urination    HPI:   Since Sunday, 3 days ago, she has had symptoms of urinary frequency and urgency along with burning discomfort with urination.   She has also noticed some blood in h Smoking status: Never Smoker      Smokeless tobacco: Never Used    Alcohol use: No    Drug use: No       EXAM:   GENERAL: Pleasant female appearing well in no distress  /82   Pulse 78   Temp 97.9 °F (36.6 °C) (Oral)   Ht 5' 4\" (1.626 m)   Wt 150

## 2019-05-23 ENCOUNTER — APPOINTMENT (OUTPATIENT)
Dept: LAB | Facility: HOSPITAL | Age: 79
End: 2019-05-23
Attending: INTERNAL MEDICINE
Payer: MEDICARE

## 2019-05-23 ENCOUNTER — HOSPITAL ENCOUNTER (OUTPATIENT)
Dept: MAMMOGRAPHY | Facility: HOSPITAL | Age: 79
Discharge: HOME | End: 2019-05-23
Attending: INTERNAL MEDICINE
Payer: MEDICARE

## 2019-05-23 DIAGNOSIS — N28.9 DECREASED RENAL FUNCTION: ICD-10-CM

## 2019-05-23 DIAGNOSIS — Z12.31 ENCOUNTER FOR SCREENING MAMMOGRAM FOR BREAST CANCER: ICD-10-CM

## 2019-05-23 PROCEDURE — 80069 RENAL FUNCTION PANEL: CPT

## 2019-05-23 PROCEDURE — 77067 SCR MAMMO BI INCL CAD: CPT | Performed by: INTERNAL MEDICINE

## 2019-05-23 PROCEDURE — 36415 COLL VENOUS BLD VENIPUNCTURE: CPT

## 2019-05-23 PROCEDURE — 77063 BREAST TOMOSYNTHESIS BI: CPT | Performed by: INTERNAL MEDICINE

## 2019-05-31 ENCOUNTER — TELEPHONE (OUTPATIENT)
Dept: OTHER | Age: 79
End: 2019-05-31

## 2019-05-31 RX ORDER — MELOXICAM 7.5 MG/1
7.5 TABLET ORAL DAILY
Qty: 30 TABLET | Refills: 1 | Status: SHIPPED | OUTPATIENT
Start: 2019-05-31 | End: 2019-08-01

## 2019-05-31 NOTE — TELEPHONE ENCOUNTER
Dr. Sue Vogel pt stated that she was in to see you on 4/25 and she had mentioned to you she was having some right hip pain so you ordered a x ray and she was informed she has arthritis. She was told if she wanted pain medication she can give you a call.  Pt s

## 2019-06-03 ENCOUNTER — TELEPHONE (OUTPATIENT)
Dept: INTERNAL MEDICINE CLINIC | Facility: CLINIC | Age: 79
End: 2019-06-03

## 2019-06-03 DIAGNOSIS — I10 HYPERTENSION, UNSPECIFIED TYPE: Primary | ICD-10-CM

## 2019-06-03 NOTE — TELEPHONE ENCOUNTER
Informed pt of Dr. Kailyn Rossi advice as per below. Pt stated she'd get her lab done in the middle of July and try to increase her fluid intake.

## 2019-06-03 NOTE — TELEPHONE ENCOUNTER
Pt calling regarding renal function panel results from 5/23. Per Pricilla Jonas notes, patient questioning if Dr. Bennett Collier would like to follow up with her. Patient currently does not want to schedule an OV if she doesn't have to. Please advise.

## 2019-06-26 ENCOUNTER — HOSPITAL ENCOUNTER (EMERGENCY)
Facility: HOSPITAL | Age: 79
Discharge: HOME | End: 2019-06-26
Attending: EMERGENCY MEDICINE
Payer: OTHER MISCELLANEOUS

## 2019-06-26 ENCOUNTER — APPOINTMENT (OUTPATIENT)
Dept: GENERAL RADIOLOGY | Facility: HOSPITAL | Age: 79
End: 2019-06-26
Attending: EMERGENCY MEDICINE
Payer: OTHER MISCELLANEOUS

## 2019-06-26 VITALS
HEIGHT: 64 IN | RESPIRATION RATE: 20 BRPM | HEART RATE: 80 BPM | DIASTOLIC BLOOD PRESSURE: 70 MMHG | OXYGEN SATURATION: 99 % | BODY MASS INDEX: 25.1 KG/M2 | SYSTOLIC BLOOD PRESSURE: 152 MMHG | TEMPERATURE: 98 F | WEIGHT: 147 LBS

## 2019-06-26 DIAGNOSIS — M25.512 ACUTE PAIN OF LEFT SHOULDER: ICD-10-CM

## 2019-06-26 DIAGNOSIS — S00.511A ABRASION OF LIP, INITIAL ENCOUNTER: ICD-10-CM

## 2019-06-26 DIAGNOSIS — S00.83XA FACIAL HEMATOMA, INITIAL ENCOUNTER: ICD-10-CM
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
## 2019-06-26 DIAGNOSIS — M25.521 RIGHT ELBOW PAIN: ICD-10-CM

## 2019-06-26 PROCEDURE — 99283 EMERGENCY DEPT VISIT LOW MDM: CPT

## 2019-06-26 PROCEDURE — 73030 X-RAY EXAM OF SHOULDER: CPT | Performed by: EMERGENCY MEDICINE

## 2019-06-27 NOTE — ED INITIAL ASSESSMENT (HPI)
Pt states she tripped at work causing her to fall forward onto her left shoulder. Pt also is c/o right elbow pain and minor lac to left upper lip. No LOC. Not on blood thinners.

## 2019-06-27 NOTE — ED PROVIDER NOTES
Patient Seen in: [facility] AND [facility] Emergency Department    History   Patient presents with:  Fall (musculoskeletal, neurologic)    Stated Complaint: fall    HPI    23-year-old female with no significant past medical history here after a mechanical fall 1 complaint: fall  Other systems are as noted in HPI. Constitutional and vital signs reviewed. All other systems reviewed and negative except as noted above.     Physical Exam     ED Triage Vitals [06/26/19 1922]   BP (!) 166/70   Pulse 88   Resp 20   T Psychiatric: She has a normal mood and affect. Nursing note and vitals reviewed. ED Course   Pulse Oximeter:  Pulse oximetry on room air is 99%, indicating adequate oxygenation.     PROCEDURES:  none    DIAGNOSTICS:   Labs:  No results found fo instructions    EMERGENCY DEPARTMENT MEDICAL DECISION MAKING:  After obtaining the patient's history, performing the physical exam and reviewing the diagnostics, multiple initial diagnoses were considered based on the presenting problem including fall, fra

## 2019-07-11 ENCOUNTER — APPOINTMENT (OUTPATIENT)
Dept: LAB | Facility: HOSPITAL | Age: 79
End: 2019-07-11
Attending: INTERNAL MEDICINE
Payer: MEDICARE

## 2019-07-11 DIAGNOSIS — I10 HYPERTENSION, UNSPECIFIED TYPE: ICD-10-CM

## 2019-07-11 LAB
ANION GAP SERPL CALC-SCNC: 3 MMOL/L (ref 0–18)
BUN BLD-MCNC: 25 MG/DL (ref 7–18)
BUN/CREAT SERPL: 22.1 (ref 10–20)
CALCIUM BLD-MCNC: 9.6 MG/DL (ref 8.5–10.1)
CHLORIDE SERPL-SCNC: 111 MMOL/L (ref 98–112)
CO2 SERPL-SCNC: 30 MMOL/L (ref 21–32)
CREAT BLD-MCNC: 1.13 MG/DL (ref 0.55–1.02)
GLUCOSE BLD-MCNC: 86 MG/DL (ref 70–99)
OSMOLALITY SERPL CALC.SUM OF ELEC: 302 MOSM/KG (ref 275–295)
PATIENT FASTING: NO
POTASSIUM SERPL-SCNC: 4.6 MMOL/L (ref 3.5–5.1)
SODIUM SERPL-SCNC: 144 MMOL/L (ref 136–145)

## 2019-07-11 PROCEDURE — 36415 COLL VENOUS BLD VENIPUNCTURE: CPT

## 2019-07-11 PROCEDURE — 80048 BASIC METABOLIC PNL TOTAL CA: CPT

## 2019-07-15 DIAGNOSIS — I10 HYPERTENSION, UNSPECIFIED TYPE: Primary | ICD-10-CM

## 2019-08-02 RX ORDER — MELOXICAM 7.5 MG/1
TABLET ORAL
Qty: 30 TABLET | Refills: 1 | Status: SHIPPED | OUTPATIENT
Start: 2019-08-02 | End: 2019-10-07

## 2019-08-02 RX ORDER — METOPROLOL SUCCINATE 50 MG/1
TABLET, EXTENDED RELEASE ORAL
Qty: 90 TABLET | Refills: 1 | Status: SHIPPED | OUTPATIENT
Start: 2019-08-02 | End: 2020-02-02

## 2019-08-02 NOTE — TELEPHONE ENCOUNTER
Review pended refill request as it does not fall under a protocol.     Last Rx: 5/31/19  LOV: 5/8/19

## 2019-10-07 ENCOUNTER — OFFICE VISIT (OUTPATIENT)
Dept: INTERNAL MEDICINE CLINIC | Facility: CLINIC | Age: 79
End: 2019-10-07
Payer: MEDICARE

## 2019-10-07 VITALS
HEART RATE: 78 BPM | WEIGHT: 146.38 LBS | TEMPERATURE: 98 F | HEIGHT: 64 IN | BODY MASS INDEX: 24.99 KG/M2 | DIASTOLIC BLOOD PRESSURE: 74 MMHG | SYSTOLIC BLOOD PRESSURE: 134 MMHG | RESPIRATION RATE: 20 BRPM

## 2019-10-07 DIAGNOSIS — I10 HYPERTENSION, UNSPECIFIED TYPE: Primary | ICD-10-CM

## 2019-10-07 DIAGNOSIS — Z23 NEED FOR VACCINATION: ICD-10-CM

## 2019-10-07 DIAGNOSIS — M85.80 OSTEOPENIA, UNSPECIFIED LOCATION: ICD-10-CM

## 2019-10-07 PROCEDURE — G0008 ADMIN INFLUENZA VIRUS VAC: HCPCS | Performed by: INTERNAL MEDICINE

## 2019-10-07 PROCEDURE — 99213 OFFICE O/P EST LOW 20 MIN: CPT | Performed by: INTERNAL MEDICINE

## 2019-10-07 PROCEDURE — G0463 HOSPITAL OUTPT CLINIC VISIT: HCPCS | Performed by: INTERNAL MEDICINE

## 2019-10-07 PROCEDURE — 90662 IIV NO PRSV INCREASED AG IM: CPT | Performed by: INTERNAL MEDICINE

## 2019-10-07 RX ORDER — MELOXICAM 7.5 MG/1
TABLET ORAL
Qty: 30 TABLET | Refills: 5 | Status: SHIPPED | OUTPATIENT
Start: 2019-10-07 | End: 2020-04-10

## 2019-10-07 NOTE — PROGRESS NOTES
HPI:    Patient ID: Meme Kohler is a 67year old female. HPI    Patient returns to the office today to discuss chronic medical issues as listed on the active problem list below.   Patient last seen in the office by me on 4/25 for AWV and labs  During th tobacco: Never Used    Alcohol use: No    Drug use: No         Review of Systems   Constitutional: Negative for chills, fatigue and fever. HENT: Negative for hearing loss. Eyes: Negative for visual disturbance.    Respiratory: Negative for cough and sh Constitutional: She appears well-developed and well-nourished. HENT:   Nose: Nose normal.   Mouth/Throat: Oropharynx is clear and moist.   Eyes: Pupils are equal, round, and reactive to light.    Cardiovascular: Normal rate, regular rhythm, normal heart

## 2019-10-31 ENCOUNTER — OFFICE VISIT (OUTPATIENT)
Dept: INTERNAL MEDICINE CLINIC | Facility: CLINIC | Age: 79
End: 2019-10-31
Payer: MEDICARE

## 2019-10-31 VITALS
HEIGHT: 64 IN | DIASTOLIC BLOOD PRESSURE: 73 MMHG | BODY MASS INDEX: 24.75 KG/M2 | HEART RATE: 82 BPM | WEIGHT: 145 LBS | OXYGEN SATURATION: 99 % | SYSTOLIC BLOOD PRESSURE: 128 MMHG

## 2019-10-31 DIAGNOSIS — M25.572 LEFT ANKLE PAIN, UNSPECIFIED CHRONICITY: Primary | ICD-10-CM

## 2019-10-31 PROCEDURE — G0463 HOSPITAL OUTPT CLINIC VISIT: HCPCS | Performed by: INTERNAL MEDICINE

## 2019-10-31 PROCEDURE — 99213 OFFICE O/P EST LOW 20 MIN: CPT | Performed by: INTERNAL MEDICINE

## 2019-10-31 NOTE — PROGRESS NOTES
Bao Hernandez is a 67year old female. Patient presents with: Ankle Pain: L-foot, pain on and off, onset 6 days ago    HPI:   72-year-old pleasant lady here for evaluation of ankle pain.   She reports that she has an annoying mild ankle pain occasionally in swelling  Trimethoprim                Comment:Other reaction(s): lip and tongue swelling     REVIEW OF SYSTEMS:     Review of Systems   Constitutional: Negative for activity change, appetite change and fever.    HENT: Negative for congestion and voice castillo follow-ups on file.

## 2019-10-31 NOTE — PATIENT INSTRUCTIONS
As discussed, you do not have any infection, fracture, dislocation in the ankle. The pain what you are experiencing is most likely musculoskeletal in nature. You can continue to take your meloxicam with meals.   We can also apply some topical analgesics l

## 2020-02-02 NOTE — TELEPHONE ENCOUNTER
Review pended refill request as it does not fall under a protocol.     Last Rx: 8/2019  LOV: 3 months ago

## 2020-02-03 RX ORDER — METOPROLOL SUCCINATE 50 MG/1
TABLET, EXTENDED RELEASE ORAL
Qty: 90 TABLET | Refills: 1 | Status: SHIPPED | OUTPATIENT
Start: 2020-02-03 | End: 2020-11-06

## 2020-02-04 ENCOUNTER — TELEPHONE (OUTPATIENT)
Dept: INTERNAL MEDICINE CLINIC | Facility: CLINIC | Age: 80
End: 2020-02-04

## 2020-02-04 NOTE — TELEPHONE ENCOUNTER
Patient calling regarding refill of Metoprolol, was sent to [facility] on 2/3/2020    Patient will check with her pharmacy

## 2020-04-08 RX ORDER — MELOXICAM 7.5 MG/1
TABLET ORAL
Qty: 30 TABLET | Refills: 4 | OUTPATIENT
Start: 2020-04-08

## 2020-04-10 RX ORDER — MELOXICAM 7.5 MG/1
TABLET ORAL
Qty: 30 TABLET | Refills: 5 | Status: SHIPPED | OUTPATIENT
Start: 2020-04-10 | End: 2020-12-18

## 2020-07-10 ENCOUNTER — LAB ENCOUNTER (OUTPATIENT)
Dept: LAB | Facility: HOSPITAL | Age: 80
End: 2020-07-10
Attending: INTERNAL MEDICINE
Payer: MEDICARE

## 2020-07-10 ENCOUNTER — OFFICE VISIT (OUTPATIENT)
Dept: INTERNAL MEDICINE CLINIC | Facility: CLINIC | Age: 80
End: 2020-07-10
Payer: MEDICARE

## 2020-07-10 VITALS
WEIGHT: 149 LBS | BODY MASS INDEX: 25.44 KG/M2 | HEIGHT: 64 IN | SYSTOLIC BLOOD PRESSURE: 128 MMHG | HEART RATE: 83 BPM | DIASTOLIC BLOOD PRESSURE: 80 MMHG

## 2020-07-10 DIAGNOSIS — Z12.31 ENCOUNTER FOR SCREENING MAMMOGRAM FOR BREAST CANCER: ICD-10-CM

## 2020-07-10 DIAGNOSIS — I10 ESSENTIAL HYPERTENSION: ICD-10-CM

## 2020-07-10 DIAGNOSIS — R32 URINARY INCONTINENCE, UNSPECIFIED TYPE: ICD-10-CM

## 2020-07-10 DIAGNOSIS — M85.80 OSTEOPENIA, UNSPECIFIED LOCATION: ICD-10-CM

## 2020-07-10 DIAGNOSIS — Z00.00 ENCOUNTER FOR ANNUAL HEALTH EXAMINATION: Primary | ICD-10-CM

## 2020-07-10 DIAGNOSIS — R19.7 DIARRHEA, UNSPECIFIED TYPE: ICD-10-CM

## 2020-07-10 DIAGNOSIS — Z78.0 POST-MENOPAUSE: ICD-10-CM

## 2020-07-10 DIAGNOSIS — M54.50 ACUTE BILATERAL LOW BACK PAIN WITHOUT SCIATICA: ICD-10-CM

## 2020-07-10 LAB
25(OH)D3 SERPL-MCNC: 43 NG/ML (ref 30–100)
ALBUMIN SERPL-MCNC: 3.7 G/DL (ref 3.4–5)
ALBUMIN/GLOB SERPL: 1 {RATIO} (ref 1–2)
ALP LIVER SERPL-CCNC: 121 U/L (ref 55–142)
ALT SERPL-CCNC: 21 U/L (ref 13–56)
ANION GAP SERPL CALC-SCNC: 3 MMOL/L (ref 0–18)
AST SERPL-CCNC: 17 U/L (ref 15–37)
BASOPHILS # BLD AUTO: 0.03 X10(3) UL (ref 0–0.2)
BASOPHILS NFR BLD AUTO: 0.4 %
BILIRUB SERPL-MCNC: 0.5 MG/DL (ref 0.1–2)
BUN BLD-MCNC: 25 MG/DL (ref 7–18)
BUN/CREAT SERPL: 21.7 (ref 10–20)
CALCIUM BLD-MCNC: 9.5 MG/DL (ref 8.5–10.1)
CHLORIDE SERPL-SCNC: 109 MMOL/L (ref 98–112)
CHOLEST SMN-MCNC: 156 MG/DL (ref ?–200)
CO2 SERPL-SCNC: 29 MMOL/L (ref 21–32)
CREAT BLD-MCNC: 1.15 MG/DL (ref 0.55–1.02)
DEPRECATED RDW RBC AUTO: 50.4 FL (ref 35.1–46.3)
EOSINOPHIL # BLD AUTO: 0.42 X10(3) UL (ref 0–0.7)
EOSINOPHIL NFR BLD AUTO: 5.7 %
ERYTHROCYTE [DISTWIDTH] IN BLOOD BY AUTOMATED COUNT: 13.5 % (ref 11–15)
GLOBULIN PLAS-MCNC: 3.7 G/DL (ref 2.8–4.4)
GLUCOSE BLD-MCNC: 88 MG/DL (ref 70–99)
HCT VFR BLD AUTO: 37.3 % (ref 35–48)
HDLC SERPL-MCNC: 51 MG/DL (ref 40–59)
HGB BLD-MCNC: 12 G/DL (ref 12–16)
IMM GRANULOCYTES # BLD AUTO: 0.02 X10(3) UL (ref 0–1)
IMM GRANULOCYTES NFR BLD: 0.3 %
LDLC SERPL CALC-MCNC: 87 MG/DL (ref ?–100)
LYMPHOCYTES # BLD AUTO: 1.93 X10(3) UL (ref 1–4)
LYMPHOCYTES NFR BLD AUTO: 26.2 %
M PROTEIN MFR SERPL ELPH: 7.4 G/DL (ref 6.4–8.2)
MCH RBC QN AUTO: 32.3 PG (ref 26–34)
MCHC RBC AUTO-ENTMCNC: 32.2 G/DL (ref 31–37)
MCV RBC AUTO: 100.5 FL (ref 80–100)
MONOCYTES # BLD AUTO: 0.64 X10(3) UL (ref 0.1–1)
MONOCYTES NFR BLD AUTO: 8.7 %
NEUTROPHILS # BLD AUTO: 4.34 X10 (3) UL (ref 1.5–7.7)
NEUTROPHILS # BLD AUTO: 4.34 X10(3) UL (ref 1.5–7.7)
NEUTROPHILS NFR BLD AUTO: 58.7 %
NONHDLC SERPL-MCNC: 105 MG/DL (ref ?–130)
OSMOLALITY SERPL CALC.SUM OF ELEC: 296 MOSM/KG (ref 275–295)
PATIENT FASTING Y/N/NP: YES
PATIENT FASTING Y/N/NP: YES
PLATELET # BLD AUTO: 229 10(3)UL (ref 150–450)
POTASSIUM SERPL-SCNC: 4.7 MMOL/L (ref 3.5–5.1)
RBC # BLD AUTO: 3.71 X10(6)UL (ref 3.8–5.3)
SODIUM SERPL-SCNC: 141 MMOL/L (ref 136–145)
TRIGL SERPL-MCNC: 91 MG/DL (ref 30–149)
TSI SER-ACNC: 1.8 MIU/ML (ref 0.36–3.74)
VLDLC SERPL CALC-MCNC: 18 MG/DL (ref 0–30)
WBC # BLD AUTO: 7.4 X10(3) UL (ref 4–11)

## 2020-07-10 PROCEDURE — 80061 LIPID PANEL: CPT

## 2020-07-10 PROCEDURE — 84443 ASSAY THYROID STIM HORMONE: CPT

## 2020-07-10 PROCEDURE — 80053 COMPREHEN METABOLIC PANEL: CPT

## 2020-07-10 PROCEDURE — G0439 PPPS, SUBSEQ VISIT: HCPCS | Performed by: INTERNAL MEDICINE

## 2020-07-10 PROCEDURE — 82306 VITAMIN D 25 HYDROXY: CPT

## 2020-07-10 PROCEDURE — 85025 COMPLETE CBC W/AUTO DIFF WBC: CPT

## 2020-07-10 PROCEDURE — 36415 COLL VENOUS BLD VENIPUNCTURE: CPT

## 2020-07-10 NOTE — PATIENT INSTRUCTIONS
Marianne Frausto's SCREENING SCHEDULE   Tests on this list are recommended by your physician but may not be covered, or covered at this frequency, by your insurer. Please check with your insurance carrier before scheduling to verify coverage.    PREVENTATIVE abnormal There are no preventive care reminders to display for this patient. Update Health Maintenance if applicable    Flex Sigmoidoscopy Screen  Covered every 5 years No results found for this or any previous visit. No flowsheet data found.      Fecal Occ visit on 04/26/18   • PNEUMOCOCCAL VACC, 13 KENDRICK IM    Please get once after your 65th birthday    Pneumococcal 23 (Pneumovax)  Covered Once after 65 Orders placed or performed in visit on 04/25/19   • PNEUMOCOCCAL IMM (PNEUMOVAX)    Please get once after y

## 2020-07-10 NOTE — PROGRESS NOTES
HPI:   Keron Luciano is a 67year old female who presents for a Medicare Subsequent Annual Wellness visit (Pt already had Initial Annual Wellness). Patient here for Medicare annual wellness visit and follow-up on chronic issues as listed below.   Last s planning including the explanation and discussion of advance directives standard forms performed Face to Face with patient and Family/surrogate (if present), and forms available to patient in AVS       She does NOT have a Power of Radha for [employer] 600 mg by mouth 2 (two) times daily. Cholecalciferol (VITAMIN D3) 400 units Oral Tab, Take by mouth.  triamcinolone acetonide 0.1 % External Cream, Apply topically 3 (three) times daily as needed. loratadine (CLARITIN) 10 MG Oral Tab, Take  by mouth. kg).    Medicare Hearing Assessment  (Required for AWV/SWV)    Hearing Screening    Screening Method:  Questionnaire  I have a problem hearing over the telephone:  No I have trouble following the conversations when two or more people are talking at the fabby regular rhythm, normal heart sounds and intact distal pulses. Edema not present. Carotid bruit not present. Pulmonary/Chest: Effort normal and breath sounds normal. She has no wheezes. She has no rales.  Right breast exhibits no inverted nipple, no mass Future    4. Post-menopause  Bone density study as above. - XR DEXA BONE DENSITOMETRY (CPT=77080); Future    5. Urinary incontinence, unspecified type  Stable. On no medications.     6. Acute bilateral low back pain without sciatica  Recent flareup of low Electrocardiogram date     Colorectal Cancer Screening      Colonoscopy Screen every 10 years There are no preventive care reminders to display for this patient.  Update Health Maintenance if applicable    Flex Sigmoidoscopy Screen every 10 years No results B) No vaccine history found This may be covered with your prescription benefits, but Medicare does not cover unless Medically needed    Zoster   Not covered by Medicare Part B No vaccine history found This may be covered with your pharmacy  prescription be

## 2020-07-30 ENCOUNTER — HOSPITAL ENCOUNTER (OUTPATIENT)
Dept: BONE DENSITY | Facility: HOSPITAL | Age: 80
Discharge: HOME | End: 2020-07-30
Attending: INTERNAL MEDICINE
Payer: MEDICARE

## 2020-07-30 ENCOUNTER — HOSPITAL ENCOUNTER (OUTPATIENT)
Dept: MAMMOGRAPHY | Facility: HOSPITAL | Age: 80
Discharge: HOME | End: 2020-07-30
Attending: INTERNAL MEDICINE
Payer: MEDICARE

## 2020-07-30 DIAGNOSIS — Z12.31 ENCOUNTER FOR SCREENING MAMMOGRAM FOR BREAST CANCER: ICD-10-CM

## 2020-07-30 DIAGNOSIS — M85.80 OSTEOPENIA, UNSPECIFIED LOCATION: ICD-10-CM

## 2020-07-30 DIAGNOSIS — Z78.0 POST-MENOPAUSE: ICD-10-CM

## 2020-07-30 PROCEDURE — 77080 DXA BONE DENSITY AXIAL: CPT | Performed by: INTERNAL MEDICINE

## 2020-07-30 PROCEDURE — 77063 BREAST TOMOSYNTHESIS BI: CPT | Performed by: INTERNAL MEDICINE

## 2020-07-30 PROCEDURE — 77067 SCR MAMMO BI INCL CAD: CPT | Performed by: INTERNAL MEDICINE

## 2020-08-13 ENCOUNTER — NURSE TRIAGE (OUTPATIENT)
Dept: INTERNAL MEDICINE CLINIC | Facility: CLINIC | Age: 80
End: 2020-08-13

## 2020-08-13 NOTE — TELEPHONE ENCOUNTER
Action Requested: Summary for Provider     []  Critical Lab, Recommendations Needed  [] Need Additional Advice  []   FYI    []   Need Orders  [] Need Medications Sent to Pharmacy  []  Other     SUMMARY:requesting RX-Triamcinolone, Hx of skin issues- a chaitanya

## 2020-08-20 ENCOUNTER — OFFICE VISIT (OUTPATIENT)
Dept: OPTOMETRY | Facility: CLINIC | Age: 80
End: 2020-08-20
Payer: MEDICARE

## 2020-08-20 DIAGNOSIS — H25.13 AGE-RELATED NUCLEAR CATARACT OF BOTH EYES: Primary | ICD-10-CM

## 2020-08-20 DIAGNOSIS — H52.4 PRESBYOPIA: ICD-10-CM

## 2020-08-20 PROCEDURE — 92012 INTRM OPH EXAM EST PATIENT: CPT | Performed by: OPTOMETRIST

## 2020-08-20 NOTE — PROGRESS NOTES
Ruth Nina is a 67year old female. HPI:     HPI     Patient is in for an annual eye exam. She has no complaints with her vision and was able to pass her DMV test with no glasses. Wears OTC reading glasses only.     Last edited by Abbi Sow, OD on 8/ Cardiovascular, Eyes, Respiratory, Psychiatric, Allergic/Imm, Heme/Lymph    Last edited by Sheryle Maltos, OD on 8/20/2020 10:45 AM. (History)          PHYSICAL EXAM:     Base Eye Exam     Visual Acuity (Snellen - Linear)       Right Left Both    Dist sc 20/4 eye exams. Patient has been advised to call immediately if they notice any changes or problems with their vision     Presbyopia  Patient will continue to uses her OTC reading glasses. No orders of the defined types were placed in this encounter.

## 2020-08-20 NOTE — PATIENT INSTRUCTIONS
Age-related nuclear cataract of both eyes  No treatment is required. Will continue to observe. Presbyopia  Patient will continue to uses her OTC reading glasses.

## 2020-08-20 NOTE — PROGRESS NOTES
Ramón Ray is a 67year old female. HPI:     HPI     Patient is in for an annual eye exam. She has no complaints with her vision and was able to pass her DMV test with no glasses. Wears OTC reading glasses only.     Last edited by Sis Fairchild, OD on 8/ Cardiovascular, Eyes, Respiratory, Psychiatric, Allergic/Imm, Heme/Lymph    Last edited by Iván Chisholm, OD on 8/20/2020 10:45 AM. (History)          PHYSICAL EXAM:     Base Eye Exam     Visual Acuity (Snellen - Linear)       Right Left Both    Dist sc 20/4 prescriptions requested or ordered in this encounter        Follow up instructions:  Return in about 1 year (around 8/20/2021) for Eye Exam.    8/20/2020  Scribed by: Evangelina Redmond

## 2020-11-04 ENCOUNTER — TELEPHONE (OUTPATIENT)
Dept: INTERNAL MEDICINE CLINIC | Facility: CLINIC | Age: 80
End: 2020-11-04

## 2020-11-04 NOTE — TELEPHONE ENCOUNTER
Spoke to Dario Henry at [address] [place] [address] [address] who confirmed Influenza vaccine was received on 10/08/2020. Chart has been updated.

## 2020-11-06 RX ORDER — METOPROLOL SUCCINATE 50 MG/1
TABLET, EXTENDED RELEASE ORAL
Qty: 90 TABLET | Refills: 1 | Status: SHIPPED | OUTPATIENT
Start: 2020-11-06 | End: 2021-05-11

## 2020-12-03 ENCOUNTER — NURSE TRIAGE (OUTPATIENT)
Dept: INTERNAL MEDICINE CLINIC | Facility: CLINIC | Age: 80
End: 2020-12-03

## 2020-12-03 NOTE — TELEPHONE ENCOUNTER
Spoke with pt,  verified. Pt informed of MD recommendation, pt stated understanding.           Future Appointments   Date Time Provider Kathleen Sujey   2020  9:00 AM Metro Saver, MD [facility]   2021 10:00 AM Nguyen Hernandez MD E

## 2020-12-03 NOTE — TELEPHONE ENCOUNTER
Action Requested: Summary for Provider     []  Critical Lab, Recommendations Needed  [] Need Additional Advice  []   FYI    []   Need Orders  [] Need Medications Sent to Pharmacy  []  Other     SUMMARY:   An appointment was made for Dec. 18,2020 first avai

## 2020-12-17 NOTE — PROGRESS NOTES
HPI:    Patient ID: Arnold Dewey is a 79year old female. HPI  Patient is here with concerns about her legs. We saw her in July for general physical exam.  Also had some low back pain at that time. Full blood work done at that time was unremarkable. INCREASE TO 2 TABLETS BY MOUTH IF NEEDED* 30 tablet 5   • Calcium Carbonate Antacid 600 MG Oral Chew Tab Chew 600 mg by mouth 2 (two) times daily. • Cholecalciferol (VITAMIN D3) 400 units Oral Tab Take by mouth.      • loratadine (CLARITIN) 10 MG Oral T Meds This Visit:  Requested Prescriptions      No prescriptions requested or ordered in this encounter       Imaging & Referrals:  None         Melo Reyes MD

## 2020-12-18 ENCOUNTER — OFFICE VISIT (OUTPATIENT)
Dept: INTERNAL MEDICINE CLINIC | Facility: CLINIC | Age: 80
End: 2020-12-18
Payer: MEDICARE

## 2020-12-18 VITALS
BODY MASS INDEX: 25.47 KG/M2 | HEIGHT: 64 IN | DIASTOLIC BLOOD PRESSURE: 83 MMHG | WEIGHT: 149.19 LBS | HEART RATE: 91 BPM | SYSTOLIC BLOOD PRESSURE: 133 MMHG

## 2020-12-18 DIAGNOSIS — I87.2 VENOUS STASIS DERMATITIS OF BOTH LOWER EXTREMITIES: Primary | ICD-10-CM

## 2020-12-18 DIAGNOSIS — I10 ESSENTIAL HYPERTENSION: ICD-10-CM

## 2020-12-18 PROCEDURE — G0463 HOSPITAL OUTPT CLINIC VISIT: HCPCS | Performed by: INTERNAL MEDICINE

## 2020-12-18 PROCEDURE — 99213 OFFICE O/P EST LOW 20 MIN: CPT | Performed by: INTERNAL MEDICINE

## 2020-12-18 RX ORDER — MELOXICAM 7.5 MG/1
TABLET ORAL
Qty: 30 TABLET | Refills: 5 | Status: SHIPPED | OUTPATIENT
Start: 2020-12-18 | End: 2021-07-12

## 2020-12-18 RX ORDER — BETAMETHASONE DIPROPIONATE 0.5 MG/G
OINTMENT TOPICAL
Qty: 45 G | Refills: 1 | Status: SHIPPED | OUTPATIENT
Start: 2020-12-18 | End: 2021-08-26

## 2020-12-19 ENCOUNTER — TELEPHONE (OUTPATIENT)
Dept: INTERNAL MEDICINE CLINIC | Facility: CLINIC | Age: 80
End: 2020-12-19

## 2020-12-19 NOTE — TELEPHONE ENCOUNTER
Patient calling reports had LOV yesterday and was  at [address] [place] [address] , had some symptoms of UTI  But they went away  and  now symptoms have returned this morning     Reports slight pain and burning with urination, notice some blood on toilet paper not in the urine

## 2020-12-21 RX ORDER — CEPHALEXIN 500 MG/1
CAPSULE ORAL
Qty: 15 CAPSULE | Refills: 0 | Status: SHIPPED | OUTPATIENT
Start: 2020-12-21 | End: 2021-07-12 | Stop reason: ALTCHOICE

## 2020-12-21 NOTE — TELEPHONE ENCOUNTER
Please call patient and see if symptoms are persisting. If they are then please treat with Cephalexin 500 mg 3 times daily for 5 days.

## 2020-12-21 NOTE — TELEPHONE ENCOUNTER
Pt states that the bleeding has subsided but she still has some burning with urination. rx sent to the pharm.

## 2021-03-08 DIAGNOSIS — Z23 NEED FOR VACCINATION: ICD-10-CM

## 2021-05-11 RX ORDER — METOPROLOL SUCCINATE 50 MG/1
TABLET, EXTENDED RELEASE ORAL
Qty: 90 TABLET | Refills: 0 | Status: SHIPPED | OUTPATIENT
Start: 2021-05-11 | End: 2021-07-12

## 2021-07-12 ENCOUNTER — OFFICE VISIT (OUTPATIENT)
Dept: INTERNAL MEDICINE CLINIC | Facility: CLINIC | Age: 81
End: 2021-07-12
Payer: MEDICARE

## 2021-07-12 ENCOUNTER — LAB ENCOUNTER (OUTPATIENT)
Dept: LAB | Facility: HOSPITAL | Age: 81
End: 2021-07-12
Attending: INTERNAL MEDICINE
Payer: MEDICARE

## 2021-07-12 VITALS
HEART RATE: 72 BPM | BODY MASS INDEX: 25.04 KG/M2 | HEIGHT: 64 IN | RESPIRATION RATE: 18 BRPM | SYSTOLIC BLOOD PRESSURE: 138 MMHG | TEMPERATURE: 97 F | WEIGHT: 146.69 LBS | DIASTOLIC BLOOD PRESSURE: 78 MMHG

## 2021-07-12 DIAGNOSIS — I87.2 VENOUS STASIS DERMATITIS OF BOTH LOWER EXTREMITIES: ICD-10-CM

## 2021-07-12 DIAGNOSIS — R32 URINARY INCONTINENCE, UNSPECIFIED TYPE: ICD-10-CM

## 2021-07-12 DIAGNOSIS — I10 ESSENTIAL HYPERTENSION: ICD-10-CM

## 2021-07-12 DIAGNOSIS — Z12.31 ENCOUNTER FOR SCREENING MAMMOGRAM FOR BREAST CANCER: ICD-10-CM

## 2021-07-12 DIAGNOSIS — Z00.00 ENCOUNTER FOR ANNUAL HEALTH EXAMINATION: Primary | ICD-10-CM

## 2021-07-12 DIAGNOSIS — M85.80 OSTEOPENIA, UNSPECIFIED LOCATION: ICD-10-CM

## 2021-07-12 DIAGNOSIS — M25.551 RIGHT HIP PAIN: ICD-10-CM

## 2021-07-12 LAB
ALBUMIN SERPL-MCNC: 3.7 G/DL (ref 3.4–5)
ALBUMIN/GLOB SERPL: 1.2 {RATIO} (ref 1–2)
ALP LIVER SERPL-CCNC: 100 U/L
ALT SERPL-CCNC: 25 U/L
ANION GAP SERPL CALC-SCNC: 5 MMOL/L (ref 0–18)
AST SERPL-CCNC: 23 U/L (ref 15–37)
BASOPHILS # BLD AUTO: 0.02 X10(3) UL (ref 0–0.2)
BASOPHILS NFR BLD AUTO: 0.3 %
BILIRUB SERPL-MCNC: 0.6 MG/DL (ref 0.1–2)
BUN BLD-MCNC: 22 MG/DL (ref 7–18)
BUN/CREAT SERPL: 21.4 (ref 10–20)
CALCIUM BLD-MCNC: 9.4 MG/DL (ref 8.5–10.1)
CHLORIDE SERPL-SCNC: 112 MMOL/L (ref 98–112)
CHOLEST SMN-MCNC: 189 MG/DL (ref ?–200)
CO2 SERPL-SCNC: 26 MMOL/L (ref 21–32)
CREAT BLD-MCNC: 1.03 MG/DL
DEPRECATED RDW RBC AUTO: 48.4 FL (ref 35.1–46.3)
EOSINOPHIL # BLD AUTO: 0.29 X10(3) UL (ref 0–0.7)
EOSINOPHIL NFR BLD AUTO: 4.1 %
ERYTHROCYTE [DISTWIDTH] IN BLOOD BY AUTOMATED COUNT: 13.1 % (ref 11–15)
GLOBULIN PLAS-MCNC: 3.2 G/DL (ref 2.8–4.4)
GLUCOSE BLD-MCNC: 93 MG/DL (ref 70–99)
HCT VFR BLD AUTO: 38.1 %
HDLC SERPL-MCNC: 53 MG/DL (ref 40–59)
HGB BLD-MCNC: 12.4 G/DL
IMM GRANULOCYTES # BLD AUTO: 0.02 X10(3) UL (ref 0–1)
IMM GRANULOCYTES NFR BLD: 0.3 %
LDLC SERPL CALC-MCNC: 122 MG/DL (ref ?–100)
LYMPHOCYTES # BLD AUTO: 1.89 X10(3) UL (ref 1–4)
LYMPHOCYTES NFR BLD AUTO: 27 %
M PROTEIN MFR SERPL ELPH: 6.9 G/DL (ref 6.4–8.2)
MCH RBC QN AUTO: 32.6 PG (ref 26–34)
MCHC RBC AUTO-ENTMCNC: 32.5 G/DL (ref 31–37)
MCV RBC AUTO: 100.3 FL
MONOCYTES # BLD AUTO: 0.58 X10(3) UL (ref 0.1–1)
MONOCYTES NFR BLD AUTO: 8.3 %
NEUTROPHILS # BLD AUTO: 4.2 X10 (3) UL (ref 1.5–7.7)
NEUTROPHILS # BLD AUTO: 4.2 X10(3) UL (ref 1.5–7.7)
NEUTROPHILS NFR BLD AUTO: 60 %
NONHDLC SERPL-MCNC: 136 MG/DL (ref ?–130)
OSMOLALITY SERPL CALC.SUM OF ELEC: 299 MOSM/KG (ref 275–295)
PATIENT FASTING Y/N/NP: YES
PATIENT FASTING Y/N/NP: YES
PLATELET # BLD AUTO: 215 10(3)UL (ref 150–450)
POTASSIUM SERPL-SCNC: 4.3 MMOL/L (ref 3.5–5.1)
RBC # BLD AUTO: 3.8 X10(6)UL
SODIUM SERPL-SCNC: 143 MMOL/L (ref 136–145)
TRIGL SERPL-MCNC: 78 MG/DL (ref 30–149)
TSI SER-ACNC: 1.74 MIU/ML (ref 0.36–3.74)
VIT B12 SERPL-MCNC: 553 PG/ML (ref 193–986)
VLDLC SERPL CALC-MCNC: 14 MG/DL (ref 0–30)
WBC # BLD AUTO: 7 X10(3) UL (ref 4–11)

## 2021-07-12 PROCEDURE — G0439 PPPS, SUBSEQ VISIT: HCPCS | Performed by: INTERNAL MEDICINE

## 2021-07-12 PROCEDURE — 82607 VITAMIN B-12: CPT

## 2021-07-12 PROCEDURE — 80061 LIPID PANEL: CPT

## 2021-07-12 PROCEDURE — 84443 ASSAY THYROID STIM HORMONE: CPT

## 2021-07-12 PROCEDURE — 36415 COLL VENOUS BLD VENIPUNCTURE: CPT

## 2021-07-12 PROCEDURE — 80053 COMPREHEN METABOLIC PANEL: CPT

## 2021-07-12 PROCEDURE — 85025 COMPLETE CBC W/AUTO DIFF WBC: CPT

## 2021-07-12 RX ORDER — METOPROLOL SUCCINATE 50 MG/1
50 TABLET, EXTENDED RELEASE ORAL DAILY
Qty: 90 TABLET | Refills: 1 | Status: SHIPPED | OUTPATIENT
Start: 2021-07-12 | End: 2022-01-17

## 2021-07-12 RX ORDER — MELOXICAM 7.5 MG/1
TABLET ORAL
Qty: 30 TABLET | Refills: 5 | Status: SHIPPED | OUTPATIENT
Start: 2021-07-12 | End: 2022-01-17

## 2021-07-12 NOTE — PROGRESS NOTES
HPI:   Britton Root is a 79year old female who presents for a Medicare Subsequent Annual Wellness visit (Pt already had Initial Annual Wellness). Patient is here requesting Medicare annual wellness visit.   Last seen in the office in December for juliet explanation and discussion of advance directives standard forms performed Face to Face with patient and Family/surrogate (if present), and forms available to patient in AVS     She does NOT have a Power of  for [employer] on file in [address] [place] [address] Leslye Marcelino Cream, Apply topically 3 (three) times daily as needed. Calcium Carbonate Antacid 600 MG Oral Chew Tab, Chew 600 mg by mouth 2 (two) times daily.   Cholecalciferol (VITAMIN D3) 400 units Oral Tab, Take by mouth.  loratadine (CLARITIN) 10 MG Oral Tab, Take especially have trouble understanding the speech of women and children: No I have trouble understanding the speaker in a large room such as at a meeting or place of Nondenominational: No   Many people I talk to seem to mumble (or don't speak clearly): No People get is no abdominal tenderness. Musculoskeletal:         General: No tenderness. Cervical back: Neck supple. Right lower leg: No edema. Left lower leg: No edema. Lymphadenopathy:      Cervical: No cervical adenopathy.    Skin:     General: Sk year.    4. Urinary incontinence, unspecified type  Stable. No need for treatment right now. 5. Venous stasis dermatitis of both lower extremities  Stable. Uses triamcinolone as needed when it gets irritated.     6. Encounter for screening mammogram fo GLU 88 07/10/2020        Cardiovascular Disease Screening    Lipid Panel  Cholesterol  Lipoprotein (HDL)  Triglycerides Covered every 5 years for all Medicare beneficiaries without apparent signs or symptoms of cardiovascular disease Lab Results   Componen & [id number]) covered once after 65 Prevnar 13: 04/26/2018    [id number]: 04/25/2019     No recommendations at this time    Hepatitis B One screening covered for patients with certain risk factors   -  No recommendations at this time    Tetanus Toxoid No

## 2021-07-12 NOTE — PATIENT INSTRUCTIONS
Salud Frausto's SCREENING SCHEDULE   Tests on this list are recommended by your physician but may not be covered, or covered at this frequency, by your insurer. Please check with your insurance carrier before scheduling to verify coverage.    PREVENTATIV recommendations at this time   Pap and Pelvic    Pap   Covered every 2 years for women at normal risk;  Annually if at high risk -  No recommendations at this time    Chlamydia Annually if high risk -  No recommendations at this time   Screening Mammogram

## 2021-08-14 ENCOUNTER — HOSPITAL ENCOUNTER (OUTPATIENT)
Dept: MAMMOGRAPHY | Facility: HOSPITAL | Age: 81
Discharge: HOME | End: 2021-08-14
Attending: INTERNAL MEDICINE
Payer: MEDICARE

## 2021-08-14 DIAGNOSIS — Z12.31 ENCOUNTER FOR SCREENING MAMMOGRAM FOR BREAST CANCER: ICD-10-CM

## 2021-08-14 PROCEDURE — 77067 SCR MAMMO BI INCL CAD: CPT | Performed by: INTERNAL MEDICINE

## 2021-08-14 PROCEDURE — 77063 BREAST TOMOSYNTHESIS BI: CPT | Performed by: INTERNAL MEDICINE

## 2021-08-26 RX ORDER — BETAMETHASONE DIPROPIONATE 0.5 MG/G
OINTMENT TOPICAL
Qty: 45 G | Refills: 0 | Status: SHIPPED | OUTPATIENT
Start: 2021-08-26

## 2021-10-11 ENCOUNTER — OFFICE VISIT (OUTPATIENT)
Dept: PHYSICAL MEDICINE AND REHAB | Facility: CLINIC | Age: 81
End: 2021-10-11
Payer: MEDICARE

## 2021-10-11 ENCOUNTER — HOSPITAL ENCOUNTER (OUTPATIENT)
Dept: GENERAL RADIOLOGY | Facility: HOSPITAL | Age: 81
Discharge: HOME | End: 2021-10-11
Attending: PHYSICAL MEDICINE & REHABILITATION
Payer: MEDICARE

## 2021-10-11 VITALS
HEIGHT: 64 IN | HEART RATE: 79 BPM | SYSTOLIC BLOOD PRESSURE: 120 MMHG | OXYGEN SATURATION: 98 % | WEIGHT: 145 LBS | BODY MASS INDEX: 24.75 KG/M2 | DIASTOLIC BLOOD PRESSURE: 66 MMHG

## 2021-10-11 DIAGNOSIS — R10.31 RIGHT GROIN PAIN: ICD-10-CM

## 2021-10-11 DIAGNOSIS — M16.11 PRIMARY OSTEOARTHRITIS OF RIGHT HIP: ICD-10-CM

## 2021-10-11 DIAGNOSIS — I10 ESSENTIAL HYPERTENSION: ICD-10-CM

## 2021-10-11 DIAGNOSIS — M47.816 LUMBAR SPONDYLOSIS: ICD-10-CM

## 2021-10-11 DIAGNOSIS — M47.816 FACET SYNDROME, LUMBAR: ICD-10-CM

## 2021-10-11 DIAGNOSIS — R10.31 RIGHT GROIN PAIN: Primary | ICD-10-CM

## 2021-10-11 PROCEDURE — 99204 OFFICE O/P NEW MOD 45 MIN: CPT | Performed by: PHYSICAL MEDICINE & REHABILITATION

## 2021-10-11 PROCEDURE — 73522 X-RAY EXAM HIPS BI 3-4 VIEWS: CPT | Performed by: PHYSICAL MEDICINE & REHABILITATION

## 2021-10-11 PROCEDURE — 72110 X-RAY EXAM L-2 SPINE 4/>VWS: CPT | Performed by: PHYSICAL MEDICINE & REHABILITATION

## 2021-10-11 NOTE — H&P
[address] H&P    Requesting Physician: Sher Mckeon MD    Chief Complaint (Reason for Visit):  Patient presents with:  New Patient: New right handed patient is here for right hip pain.  States she Activity      Alcohol use: No      Drug use: No      Sexual activity: Not Currently        Partners: Male      CURRENT MEDICATIONS:   Current Outpatient Medications   Medication Sig Dispense Refill   • BETAMETHASONE DIPROPIONATE AUG 0.05 % External Ointmen capillary refill. Lungs: Non-labored respirations  Abdomen: No abdominal guarding  Extremities: No lower extremity edema bilaterally   Skin: No lesions noted.    Cognition: alert & oriented x 3, attentive, able to follow 2 step commands, comprehention int • GFR, Non- 07/12/2021 52* >=60 Final   • GFR, -American 07/12/2021 60  >=60 Final   • ALT 07/12/2021 25  13 - 56 U/L Final   • AST 07/12/2021 23  15 - 37 U/L Final   • Alkaline Phosphatase 07/12/2021 100  55 - 142 U/L Final   • Bi 0. 02  0.00 - 1.00 x10(3) uL Final   • Neutrophil % 07/12/2021 60.0  % Final   • Lymphocyte % 07/12/2021 27.0  % Final   • Monocyte % 07/12/2021 8.3  % Final   • Eosinophil % 07/12/2021 4.1  % Final   • Basophil % 07/12/2021 0.3  % Final   • Immature Granul scores are a comparison to sex-matched patients with mean peak bone mass and are given in standard deviation (s.d.).  Each 1 s.d. corresponds to approximately 10% below peak normal bone density.         [facility]GANIZATION CRITERIA   NORMAL T SCORE: weeks.  If no improvement, then I would recommend a right hip corticosteroid injection under ultrasound guidance. There again in 6 weeks    RTC in 6 weeks     Discharge Instructions were provided as documented in AVS summary.   The patient was in agreement

## 2021-10-11 NOTE — PATIENT INSTRUCTIONS
1) Get XR of the lumbar spine and pelvis today on your way out  2) Continue Meloxicam twice per day as needed for pain. Take with food  3) Begin physical  Therapy at the [facility].  If they do not have Saturday hours, then call me and we can recomme

## 2021-11-18 ENCOUNTER — APPOINTMENT (OUTPATIENT)
Dept: GENERAL RADIOLOGY | Facility: HOSPITAL | Age: 81
End: 2021-11-18
Payer: OTHER MISCELLANEOUS

## 2021-11-18 ENCOUNTER — HOSPITAL ENCOUNTER (EMERGENCY)
Facility: HOSPITAL | Age: 81
Discharge: HOME | End: 2021-11-18
Payer: OTHER MISCELLANEOUS

## 2021-11-18 ENCOUNTER — APPOINTMENT (OUTPATIENT)
Dept: CT IMAGING | Facility: HOSPITAL | Age: 81
End: 2021-11-18
Payer: OTHER MISCELLANEOUS

## 2021-11-18 VITALS
SYSTOLIC BLOOD PRESSURE: 162 MMHG | RESPIRATION RATE: 18 BRPM | OXYGEN SATURATION: 98 % | DIASTOLIC BLOOD PRESSURE: 94 MMHG | TEMPERATURE: 98 F | WEIGHT: 145 LBS | BODY MASS INDEX: 25 KG/M2 | HEART RATE: 90 BPM

## 2021-11-18 DIAGNOSIS — S62.627A CLOSED DISPLACED FRACTURE OF MIDDLE PHALANX OF LEFT LITTLE FINGER, INITIAL ENCOUNTER: ICD-10-CM

## 2021-11-18 DIAGNOSIS — S01.112A EYEBROW LACERATION, LEFT, INITIAL ENCOUNTER: ICD-10-CM

## 2021-11-18 DIAGNOSIS — S09.90XA INJURY OF HEAD, INITIAL ENCOUNTER: Primary | ICD-10-CM

## 2021-11-18 PROCEDURE — 12011 RPR F/E/E/N/L/M 2.5 CM/<: CPT

## 2021-11-18 PROCEDURE — 99284 EMERGENCY DEPT VISIT MOD MDM: CPT

## 2021-11-18 PROCEDURE — 70450 CT HEAD/BRAIN W/O DYE: CPT

## 2021-11-18 PROCEDURE — 73140 X-RAY EXAM OF FINGER(S): CPT

## 2021-11-19 ENCOUNTER — TELEPHONE (OUTPATIENT)
Dept: INTERNAL MEDICINE CLINIC | Facility: CLINIC | Age: 81
End: 2021-11-19

## 2021-11-19 NOTE — TELEPHONE ENCOUNTER
Patient calling, identified name and , fel at work and had to go to the ER     She has sutures over her eyebrow that need to be removed on Monday     Informed mask is required for building entry and appointment.   May have one support person at the visit

## 2021-11-19 NOTE — ED INITIAL ASSESSMENT (HPI)
Patient complains of falling today, states it was a mechanical fall, lac noted to left eye brow, abrasions to bilat hands, denies blood thinners, denies loc

## 2021-11-19 NOTE — ED PROVIDER NOTES
Patient Seen in: [facility] AND [facility] Emergency Department    History   Patient presents with:  Fall      HPI    The patient presents to the ED after tripping on the edge of a carpet at work today and falling hitting her head and injuring her left fifth fin a droplet mask on my arrival to the room.  Personal protective equipment including droplet mask, eye protection, and gloves were worn throughout the duration of the exam.  Handwashing was performed prior to and after the exam.  Stethoscope and any equipment mEily Jennings MD on 11/18/2021 at 7:07 PM          CT BRAIN OR HEAD (50438)    Result Date: 11/18/2021  CONCLUSION:  1. Skin deformity compatible with laceration over the anterior-lateral aspect of the left orbital roof.   No acute osseous fracture calvariu left eyebrow was anesthetized in the usual fashion. The wound was scrubbed, draped and explored. There were no deep structures involved. The wound was repaired with 6/0 Prolene. The wound repair was simple. The procedure was performed by myself.     Ad

## 2021-11-22 ENCOUNTER — OFFICE VISIT (OUTPATIENT)
Dept: INTERNAL MEDICINE CLINIC | Facility: CLINIC | Age: 81
End: 2021-11-22
Payer: MEDICARE

## 2021-11-22 VITALS
SYSTOLIC BLOOD PRESSURE: 144 MMHG | DIASTOLIC BLOOD PRESSURE: 78 MMHG | HEART RATE: 88 BPM | BODY MASS INDEX: 25.13 KG/M2 | WEIGHT: 147.19 LBS | HEIGHT: 64 IN

## 2021-11-22 DIAGNOSIS — S62.627D CLOSED DISPLACED FRACTURE OF MIDDLE PHALANX OF LEFT LITTLE FINGER WITH ROUTINE HEALING, SUBSEQUENT ENCOUNTER: ICD-10-CM

## 2021-11-22 DIAGNOSIS — S01.112D LACERATION OF LEFT EYEBROW, SUBSEQUENT ENCOUNTER: Primary | ICD-10-CM

## 2021-11-22 PROCEDURE — 99213 OFFICE O/P EST LOW 20 MIN: CPT | Performed by: INTERNAL MEDICINE

## 2021-11-22 NOTE — PROGRESS NOTES
Janny Paz is a [de-identified]year old female. Patient presents with:  ER F/U: patient went to [address] [place] [address] ER on 11/18/21    HPI:   Ms. Dalila Olmstead presents this afternoon for ER follow-up. She was at work on November 18 when she tripped and fell.   She sustained a laceration UTI (urinary tract infection) 2011    medication     Past Surgical History:   Procedure Laterality Date   • APPENDECTOMY  1994   • HYSTERECTOMY  1994    JESI   • OOPHORECTOMY  1994    BSO      Social History:  Social History    Tobacco Use      Smoking stat

## 2021-11-22 NOTE — PATIENT INSTRUCTIONS
Your left eyebrow laceration seems to be healing well and all sutures were removed. Continue with your splint on your left 5th finger and schedule an appointment with Dr. Luiz Llamas.

## 2021-11-23 ENCOUNTER — OFFICE VISIT (OUTPATIENT)
Dept: PHYSICAL THERAPY | Age: 81
End: 2021-11-23
Attending: PHYSICAL MEDICINE & REHABILITATION
Payer: MEDICARE

## 2021-11-23 DIAGNOSIS — I10 ESSENTIAL HYPERTENSION: ICD-10-CM

## 2021-11-23 DIAGNOSIS — M16.11 PRIMARY OSTEOARTHRITIS OF RIGHT HIP: ICD-10-CM

## 2021-11-23 DIAGNOSIS — R10.31 RIGHT GROIN PAIN: ICD-10-CM

## 2021-11-23 DIAGNOSIS — M47.816 LUMBAR SPONDYLOSIS: ICD-10-CM

## 2021-11-23 DIAGNOSIS — M47.816 FACET SYNDROME, LUMBAR: ICD-10-CM

## 2021-11-23 PROCEDURE — 97110 THERAPEUTIC EXERCISES: CPT

## 2021-11-23 PROCEDURE — 97162 PT EVAL MOD COMPLEX 30 MIN: CPT

## 2021-11-23 NOTE — PROGRESS NOTES
LOWER EXTREMITY EVALUATION:   Referring Physician: Dr. Lili Ivan  Diagnosis:    Hip OA Date of Service: 11/23/2021     PATIENT SUMMARY   Zohreh Ambrose is a [de-identified]year old female who presents to therapy today with complaints of hip and back pain.  Hip pain began factors include limited lumbar spine range of motion, functional hip strength deficits as noted by decreased R leg stance time during ambulation, decreased gait speed, antalgic gait and anterio-medial hip musculature tenderness.  Functional deficits include with flexion and IR components     Gait: pt ambulates on level ground with antalgia, decreased stance phase on the right, decreased arm swing and stooped posture/forward lean  Balance: SLS: R 1 sec, L 3 sec    Today’s Treatment and Response:   Pt education Keaton Bates Do \[facility] [place] [facility]\"" 83 certification required: Yes  I certify the need for these services furnished under this plan of treatment and while under my care.     X___________________________________________________ Date____________________    Certification From

## 2021-11-29 ENCOUNTER — OFFICE VISIT (OUTPATIENT)
Dept: SURGERY | Facility: CLINIC | Age: 81
End: 2021-11-29
Payer: MEDICARE

## 2021-11-29 ENCOUNTER — HOSPITAL ENCOUNTER (OUTPATIENT)
Dept: GENERAL RADIOLOGY | Facility: HOSPITAL | Age: 81
Discharge: HOME | End: 2021-11-29
Attending: PLASTIC SURGERY
Payer: MEDICARE

## 2021-11-29 ENCOUNTER — OFFICE VISIT (OUTPATIENT)
Dept: SURGERY | Facility: CLINIC | Age: 81
End: 2021-11-29
Payer: OTHER MISCELLANEOUS

## 2021-11-29 DIAGNOSIS — S62.627A CLOSED DISPLACED FRACTURE OF MIDDLE PHALANX OF LEFT LITTLE FINGER, INITIAL ENCOUNTER: ICD-10-CM

## 2021-11-29 DIAGNOSIS — M62.81 DISTAL MUSCLE WEAKNESS: Primary | ICD-10-CM

## 2021-11-29 DIAGNOSIS — M25.642 STIFFNESS OF JOINT, HAND, LEFT: ICD-10-CM

## 2021-11-29 DIAGNOSIS — S62.627A CLOSED DISPLACED FRACTURE OF MIDDLE PHALANX OF LEFT LITTLE FINGER, INITIAL ENCOUNTER: Primary | ICD-10-CM

## 2021-11-29 PROCEDURE — 73140 X-RAY EXAM OF FINGER(S): CPT | Performed by: PLASTIC SURGERY

## 2021-11-29 PROCEDURE — 99204 OFFICE O/P NEW MOD 45 MIN: CPT | Performed by: PLASTIC SURGERY

## 2021-11-29 PROCEDURE — 29130 APPL FINGER SPLINT STATIC: CPT | Performed by: OCCUPATIONAL THERAPIST

## 2021-11-29 NOTE — H&P
Kaylin Chiang is a [de-identified]year old female that presents with Patient presents with:  Pre-Op: Left small finger displaced fx  .     REFERRED BY:  Lamin Carlton    Pacemaker: No  Latex Allergy: no  Coumadin: No  Plavix: No  Other anticoagulants: No  Cardiac stents MEDICATIONS  Current Outpatient Medications   Medication Sig Dispense Refill   • BETAMETHASONE DIPROPIONATE AUG 0.05 % External Ointment APPLY TO AFFECTED AREA TWICE A DAY AS NEEDED 45 g 0   • Meloxicam 7.5 MG Oral Tab TAKE 1 TABLET BY MOUTH DAILY *MAY Fracture: LSF middle phalanx neck, displaced, 6days old  Severe DIP arthritis    We discussed the fracture/dislocation and the treatment options. Questions were answered and the patient wishes to proceed with treatment.     She has limited motion of

## 2021-12-07 ENCOUNTER — OFFICE VISIT (OUTPATIENT)
Dept: SURGERY | Facility: CLINIC | Age: 81
End: 2021-12-07
Payer: MEDICARE

## 2021-12-07 ENCOUNTER — OFFICE VISIT (OUTPATIENT)
Dept: PHYSICAL THERAPY | Age: 81
End: 2021-12-07
Attending: PHYSICAL MEDICINE & REHABILITATION
Payer: MEDICARE

## 2021-12-07 DIAGNOSIS — M25.642 STIFFNESS OF JOINT, HAND, LEFT: Primary | ICD-10-CM

## 2021-12-07 DIAGNOSIS — M62.81 DISTAL MUSCLE WEAKNESS: ICD-10-CM

## 2021-12-07 PROCEDURE — 97110 THERAPEUTIC EXERCISES: CPT

## 2021-12-07 PROCEDURE — 97166 OT EVAL MOD COMPLEX 45 MIN: CPT | Performed by: OCCUPATIONAL THERAPIST

## 2021-12-07 PROCEDURE — 97140 MANUAL THERAPY 1/> REGIONS: CPT

## 2021-12-07 PROCEDURE — 97110 THERAPEUTIC EXERCISES: CPT | Performed by: OCCUPATIONAL THERAPIST

## 2021-12-07 NOTE — PROGRESS NOTES
OCCUPATIONAL THERAPY EVALUATION:   Anthony Trinidad   [id number]       SUBJECTIVE:    HX of Injury: LSF MPH fracture displaced. Chief Complaint:   Minimal discomfort. .    Precautions: 2 # lifting restriction with the left hand.   Premorbid Functional Status: I I have reviewed the treatment plan and concur.    Cecelia Rodriguez MD

## 2021-12-08 NOTE — PROGRESS NOTES
Dx: R Hip OA         Insurance (Authorized # of Visits):  Medicare (10)           Authorizing Physician: Dr. Coco Lassiter  Next MD visit: none scheduled  Fall Risk: standard         Precautions: n/a             Subjective: Has some good days and some bad days.  Do

## 2021-12-09 ENCOUNTER — OFFICE VISIT (OUTPATIENT)
Dept: PHYSICAL THERAPY | Age: 81
End: 2021-12-09
Attending: PHYSICAL MEDICINE & REHABILITATION
Payer: MEDICARE

## 2021-12-09 PROCEDURE — 97140 MANUAL THERAPY 1/> REGIONS: CPT

## 2021-12-09 PROCEDURE — 97110 THERAPEUTIC EXERCISES: CPT

## 2021-12-10 NOTE — PROGRESS NOTES
Dx:  R Hip OA         Insurance (Authorized # of Visits):  Medicare (10)           Authorizing Physician: Dr. Sal Noriega  Next MD visit: none scheduled  Fall Risk: standard         Precautions: n/a             Subjective: Has been noticing improvement most notab posture  Standing hip abduction 2x10 2# each  Bike L1 for hip mobility seat 9 x3 min, easy spin                    HEP: lateral walks, hip flexor stretch    Charges: 2 therex, 1 manual therapy       Total Timed Treatment: 45 min  Total Treatment Time: 39 m

## 2021-12-14 ENCOUNTER — OFFICE VISIT (OUTPATIENT)
Dept: PHYSICAL THERAPY | Age: 81
End: 2021-12-14
Attending: PHYSICAL MEDICINE & REHABILITATION
Payer: MEDICARE

## 2021-12-14 PROCEDURE — 97140 MANUAL THERAPY 1/> REGIONS: CPT

## 2021-12-14 PROCEDURE — 97110 THERAPEUTIC EXERCISES: CPT

## 2021-12-16 ENCOUNTER — OFFICE VISIT (OUTPATIENT)
Dept: PHYSICAL THERAPY | Age: 81
End: 2021-12-16
Attending: PHYSICAL MEDICINE & REHABILITATION
Payer: MEDICARE

## 2021-12-16 PROCEDURE — 97110 THERAPEUTIC EXERCISES: CPT

## 2021-12-16 PROCEDURE — 97140 MANUAL THERAPY 1/> REGIONS: CPT

## 2021-12-17 NOTE — PROGRESS NOTES
Dx: R Hip OA         Insurance (Authorized # of Visits):  Medicare (10)           Authorizing Physician: Dr. Ro Mccord  Next MD visit: none scheduled  Fall Risk: standard         Precautions: n/a             Subjective: Hip feels good today.  Hip hurts less whe 2x10  Mini squats x10 with hand support Therex:  Prone lying x2 min, with pillows under hips x2  Prone glute squeezes 3 sec holds x15  -slight soreness, resolved with sitting  Alt marching with fingertip support 2x15 each cues for posture  Standing hip abd

## 2021-12-20 ENCOUNTER — OFFICE VISIT (OUTPATIENT)
Dept: SURGERY | Facility: CLINIC | Age: 81
End: 2021-12-20
Payer: OTHER MISCELLANEOUS

## 2021-12-20 DIAGNOSIS — M62.81 DISTAL MUSCLE WEAKNESS: ICD-10-CM

## 2021-12-20 DIAGNOSIS — S62.627A CLOSED DISPLACED FRACTURE OF MIDDLE PHALANX OF LEFT LITTLE FINGER, INITIAL ENCOUNTER: Primary | ICD-10-CM

## 2021-12-20 DIAGNOSIS — M25.642 STIFFNESS OF JOINT, HAND, LEFT: Primary | ICD-10-CM

## 2021-12-20 PROCEDURE — 97110 THERAPEUTIC EXERCISES: CPT | Performed by: OCCUPATIONAL THERAPIST

## 2021-12-20 PROCEDURE — 99213 OFFICE O/P EST LOW 20 MIN: CPT | Performed by: PLASTIC SURGERY

## 2021-12-20 NOTE — PROGRESS NOTES
Injury 1: LSF MPH fracture displaced, seen 11 days post-injury  - Date: 11/18/21  - Days Since: 32  Intermittent dull discomfort at the DIP with activity. Rates pain 3/10. Not taking analgesics.   Denies numbness and tingling  Edema    32 days post injury

## 2021-12-20 NOTE — PROGRESS NOTES
Subjective: I do not have pain.       Objective:     Current level of performance:  ADL: Independet  Work: [employer]  Leisure: Not addressed    Measurements/Tests:  ROM:  Testing By: jose   Strength Right: 25 #      Strength Left: 30 #      Treatme

## 2021-12-21 ENCOUNTER — OFFICE VISIT (OUTPATIENT)
Dept: PHYSICAL THERAPY | Age: 81
End: 2021-12-21
Attending: PHYSICAL MEDICINE & REHABILITATION
Payer: MEDICARE

## 2021-12-21 PROCEDURE — 97140 MANUAL THERAPY 1/> REGIONS: CPT

## 2021-12-21 PROCEDURE — 97110 THERAPEUTIC EXERCISES: CPT

## 2021-12-22 NOTE — PROGRESS NOTES
Dx:  R Hip OA         Insurance (Authorized # of Visits):  Medicare (10)           Authorizing Physician: Dr. Lili Ivan  Next MD visit: none scheduled  Fall Risk: standard         Precautions: n/a             Subjective: Hip has been on and off since last visit of bed 3x30s holds  Bent over hip extensions x20 each  Lateral walks across table x5 laps  Standing hip abduction 2x10  Mini squats x10 with hand support Therex:  Prone lying x2 min, with pillows under hips x2  Prone glute squeezes 3 sec holds x15  -slight

## 2021-12-28 ENCOUNTER — APPOINTMENT (OUTPATIENT)
Dept: PHYSICAL THERAPY | Age: 81
End: 2021-12-28
Attending: PHYSICAL MEDICINE & REHABILITATION
Payer: MEDICARE

## 2021-12-30 ENCOUNTER — OFFICE VISIT (OUTPATIENT)
Dept: PHYSICAL THERAPY | Age: 81
End: 2021-12-30
Attending: PHYSICAL MEDICINE & REHABILITATION
Payer: MEDICARE

## 2021-12-30 PROCEDURE — 97140 MANUAL THERAPY 1/> REGIONS: CPT

## 2021-12-30 PROCEDURE — 97110 THERAPEUTIC EXERCISES: CPT

## 2021-12-31 NOTE — PROGRESS NOTES
Dx: R Hip OA         Insurance (Authorized # of Visits):  Medicare (10)           Authorizing Physician: Dr. Beckford Body  Next MD visit: none scheduled  Fall Risk: standard         Precautions: n/a             Subjective: Hip feels sore today.  Still seems like t III  Compression in hooklying with abduction/ext rot bias grade III Manual:  Long axis hip distraction w/ flexion bias grade III   Manual:  Long axis hip distraction w/ flexion and abduction bias grade 3   Therex:  Supine hip flexor stretch off edge of bed HEP: lateral walks, hip flexor stretch    Charges: 2 therex, 1 manual therapy       Total Timed Treatment: 45 min  Total Treatment Time: 45 min

## 2022-01-17 ENCOUNTER — OFFICE VISIT (OUTPATIENT)
Dept: PHYSICAL MEDICINE AND REHAB | Facility: CLINIC | Age: 82
End: 2022-01-17
Payer: MEDICARE

## 2022-01-17 ENCOUNTER — OFFICE VISIT (OUTPATIENT)
Dept: INTERNAL MEDICINE CLINIC | Facility: CLINIC | Age: 82
End: 2022-01-17
Payer: MEDICARE

## 2022-01-17 ENCOUNTER — TELEPHONE (OUTPATIENT)
Dept: PHYSICAL MEDICINE AND REHAB | Facility: CLINIC | Age: 82
End: 2022-01-17

## 2022-01-17 VITALS
SYSTOLIC BLOOD PRESSURE: 130 MMHG | OXYGEN SATURATION: 97 % | WEIGHT: 146 LBS | HEART RATE: 71 BPM | DIASTOLIC BLOOD PRESSURE: 70 MMHG | HEIGHT: 64 IN | BODY MASS INDEX: 24.92 KG/M2

## 2022-01-17 VITALS
BODY MASS INDEX: 25.06 KG/M2 | RESPIRATION RATE: 18 BRPM | WEIGHT: 146.81 LBS | HEART RATE: 86 BPM | TEMPERATURE: 97 F | DIASTOLIC BLOOD PRESSURE: 76 MMHG | SYSTOLIC BLOOD PRESSURE: 132 MMHG | HEIGHT: 64 IN

## 2022-01-17 DIAGNOSIS — I10 ESSENTIAL HYPERTENSION: Primary | ICD-10-CM

## 2022-01-17 DIAGNOSIS — M85.80 OSTEOPENIA, UNSPECIFIED LOCATION: ICD-10-CM

## 2022-01-17 DIAGNOSIS — M47.816 FACET SYNDROME, LUMBAR: ICD-10-CM

## 2022-01-17 DIAGNOSIS — I10 ESSENTIAL HYPERTENSION: ICD-10-CM

## 2022-01-17 DIAGNOSIS — I87.2 VENOUS STASIS DERMATITIS OF BOTH LOWER EXTREMITIES: ICD-10-CM

## 2022-01-17 DIAGNOSIS — M16.10 HIP ARTHRITIS: ICD-10-CM

## 2022-01-17 DIAGNOSIS — M16.11 PRIMARY OSTEOARTHRITIS OF RIGHT HIP: ICD-10-CM

## 2022-01-17 DIAGNOSIS — R32 URINARY INCONTINENCE, UNSPECIFIED TYPE: ICD-10-CM

## 2022-01-17 DIAGNOSIS — R10.31 RIGHT GROIN PAIN: Primary | ICD-10-CM

## 2022-01-17 DIAGNOSIS — M47.816 LUMBAR SPONDYLOSIS: ICD-10-CM

## 2022-01-17 PROCEDURE — 20611 DRAIN/INJ JOINT/BURSA W/US: CPT | Performed by: PHYSICAL MEDICINE & REHABILITATION

## 2022-01-17 PROCEDURE — 99214 OFFICE O/P EST MOD 30 MIN: CPT | Performed by: PHYSICAL MEDICINE & REHABILITATION

## 2022-01-17 PROCEDURE — 99214 OFFICE O/P EST MOD 30 MIN: CPT | Performed by: INTERNAL MEDICINE

## 2022-01-17 RX ORDER — METOPROLOL SUCCINATE 50 MG/1
50 TABLET, EXTENDED RELEASE ORAL DAILY
Qty: 90 TABLET | Refills: 1 | Status: SHIPPED | OUTPATIENT
Start: 2022-01-17

## 2022-01-17 RX ORDER — LIDOCAINE HYDROCHLORIDE 10 MG/ML
9 INJECTION, SOLUTION INFILTRATION; PERINEURAL ONCE
Status: COMPLETED | OUTPATIENT
Start: 2022-01-17 | End: 2022-01-17

## 2022-01-17 RX ORDER — MELOXICAM 7.5 MG/1
TABLET ORAL
Qty: 90 TABLET | Refills: 1 | Status: SHIPPED | OUTPATIENT
Start: 2022-01-17

## 2022-01-17 RX ORDER — TRIAMCINOLONE ACETONIDE 40 MG/ML
40 INJECTION, SUSPENSION INTRA-ARTICULAR; INTRAMUSCULAR ONCE
Status: COMPLETED | OUTPATIENT
Start: 2022-01-17 | End: 2022-01-17

## 2022-01-17 NOTE — PATIENT INSTRUCTIONS
1) My office will call you to schedule the RIGHT hip CSI under ultrasound guidance once the procedure is approved by your insurance carrier.     2) Please make an appointment with Dr. Mayela Zapata (Orthopedics) for surgical consultation  3) Continue a home

## 2022-01-17 NOTE — PROGRESS NOTES
[address]  Progress Note    CHIEF COMPLAINT:  Patient presents with:  Groin Pain: LOV: 10/11/2021 pt comes in today with sharp R groin pain. denies T/N. denies weakness. imaging ysabel at [facility].  currently in PT Tablet 24 Hr Take 1 tablet (50 mg total) by mouth daily.  90 tablet 1   • Meloxicam 7.5 MG Oral Tab TAKE 1 TABLET BY MOUTH DAILY *MAY INCREASE TO 2 TABLETS BY MOUTH IF NEEDED* 90 tablet 1   • BETAMETHASONE DIPROPIONATE AUG 0.05 % External Ointment APPLY TO comprehention intact, spontaneous speech intact  Motor:    Musculoskeletal:    HIP:  Inspection: no erythema, swelling, or obvious deformity  Palpation: Tender to palpation over the right glutes and piriformis, right greater trochanter, right anterior groi Final   • AST 07/12/2021 23  15 - 37 U/L Final   • Alkaline Phosphatase 07/12/2021 100  55 - 142 U/L Final   • Bilirubin, Total 07/12/2021 0.6  0.1 - 2.0 mg/dL Final   • Total Protein 07/12/2021 6.9  6.4 - 8.2 g/dL Final   • Albumin 07/12/2021 3.7  3.4 - 5 8.3  % Final   • Eosinophil % 07/12/2021 4.1  % Final   • Basophil % 07/12/2021 0.3  % Final   • Immature Granulocyte % 07/12/2021 0.3  % Final   ]      Radiology Imaging:  I reviewed with the patient her X-ray of the lumbar spine and hip right from 10/11/ dextroscoliosis to the lumbar spine.  There is mild anterolisthesis of L4 on L5 due to facet joint arthropathy.  The lumbar spine is otherwise well aligned.    VERTEBRAL BODIES:   The osseous structures are demineralized.  There are 5 lumbar type vertebral point we can determine if she will be having surgery or if she would like to continue with treatment with nonoperative management. RTC in 2 months  Discharge Instructions were provided as documented in AVS summary.   The patient was in agreement with

## 2022-01-17 NOTE — PROCEDURES
[address]   Hip Injection Procedure Note    CHIEF COMPLAINT:  Patient presents with:  Groin Pain: LOV: 10/11/2021 pt comes in today with sharp R groin pain. denies T/N. denies weakness. imaging doen at [facility]. 07/12/2021 Yes   Final   • Cholesterol, Total 07/12/2021 189  <200 mg/dL Final   • HDL Cholesterol 07/12/2021 53  40 - 59 mg/dL Final   • Triglycerides 07/12/2021 78  30 - 149 mg/dL Final   • LDL Cholesterol 07/12/2021 122* <100 mg/dL Final   • VLDL 07/12/ curvilinear ultrasound transducer, the RIGHT femoro-acetabular joint was identified and prior to inserting the needle the patient's major vessels including the femoral artery and vein were visualized and found to be medial to the chosen approach.  Jorge Aa

## 2022-01-17 NOTE — PROGRESS NOTES
HPI:    Patient ID: Sammy Grimes is a [de-identified]year old female. HPI  Patient is here for follow-up on chronic medical issues as listed below. Last seen in the office in mid July. At that time is at the annual wellness visit. Complaint of right hip pain.   America Gomez No         Review of Systems          Current Outpatient Medications   Medication Sig Dispense Refill   • BETAMETHASONE DIPROPIONATE AUG 0.05 % External Ointment APPLY TO AFFECTED AREA TWICE A DAY AS NEEDED 45 g 0   • Meloxicam 7.5 MG Oral Tab TAKE 1 TABLE Mood and Affect: Mood normal.         Behavior: Behavior normal.         Thought Content:  Thought content normal.          Wt Readings from Last 6 Encounters:  11/22/21 : 147 lb 3.2 oz (66.8 kg)  11/18/21 : 145 lb (65.8 kg)  10/11/21 : 145 lb (65.8 kg)  0

## 2022-01-17 NOTE — TELEPHONE ENCOUNTER
Per Medicare guidelines-no authorization required for Lidocaine/Kenalog injections in office if PCP is [facility] provider    RIGHT hip CSI under ultrasound guidance CPT 93934+    Status: Approved-Covered Benefit    Routing to clinical staff to schedule

## 2022-03-14 ENCOUNTER — OFFICE VISIT (OUTPATIENT)
Dept: ORTHOPEDICS CLINIC | Facility: CLINIC | Age: 82
End: 2022-03-14
Payer: MEDICARE

## 2022-03-14 DIAGNOSIS — M16.11 PRIMARY OSTEOARTHRITIS OF RIGHT HIP: Primary | ICD-10-CM

## 2022-03-14 PROCEDURE — 99203 OFFICE O/P NEW LOW 30 MIN: CPT | Performed by: ORTHOPAEDIC SURGERY

## 2022-03-17 ENCOUNTER — OFFICE VISIT (OUTPATIENT)
Dept: PHYSICAL MEDICINE AND REHAB | Facility: CLINIC | Age: 82
End: 2022-03-17
Payer: MEDICARE

## 2022-03-17 ENCOUNTER — TELEPHONE (OUTPATIENT)
Dept: PHYSICAL MEDICINE AND REHAB | Facility: CLINIC | Age: 82
End: 2022-03-17

## 2022-03-17 VITALS
DIASTOLIC BLOOD PRESSURE: 72 MMHG | BODY MASS INDEX: 24.92 KG/M2 | WEIGHT: 146 LBS | HEIGHT: 64 IN | SYSTOLIC BLOOD PRESSURE: 128 MMHG

## 2022-03-17 DIAGNOSIS — M16.11 PRIMARY OSTEOARTHRITIS OF RIGHT HIP: ICD-10-CM

## 2022-03-17 DIAGNOSIS — M47.816 FACET SYNDROME, LUMBAR: ICD-10-CM

## 2022-03-17 DIAGNOSIS — R10.31 RIGHT GROIN PAIN: Primary | ICD-10-CM

## 2022-03-17 DIAGNOSIS — I10 ESSENTIAL HYPERTENSION: ICD-10-CM

## 2022-03-17 DIAGNOSIS — M47.816 LUMBAR SPONDYLOSIS: ICD-10-CM

## 2022-03-17 PROCEDURE — 99214 OFFICE O/P EST MOD 30 MIN: CPT | Performed by: PHYSICAL MEDICINE & REHABILITATION

## 2022-03-17 NOTE — PATIENT INSTRUCTIONS
1) My office will call you to schedule the RIGHT hip CSI under ultrasound guidance once the procedure is approved by your insurance carrier. 2) Discuss timeline of surgery and injection with Dr. Sharon Venegas when you see him. We can perform the injection anytime after April 17 2022  3) Tylenol 500-1000 mg every 6-8 hours as needed for pain. No more than 3000 mg daily. 4) Continue meloxicam as needed for pain.

## 2022-03-17 NOTE — TELEPHONE ENCOUNTER
Per Medicare guidelines-no authorization required for Lidocaine/Kenalog injections in office     RIGHT hip CSI under ultrasound guidance  CPT 33401+    Status: Approved-Covered Benefit    Routing to clinical staff to schedule

## 2022-04-07 ENCOUNTER — OFFICE VISIT (OUTPATIENT)
Dept: OPHTHALMOLOGY | Facility: CLINIC | Age: 82
End: 2022-04-07
Payer: MEDICARE

## 2022-04-07 DIAGNOSIS — H25.13 AGE-RELATED NUCLEAR CATARACT OF BOTH EYES: Primary | ICD-10-CM

## 2022-04-07 DIAGNOSIS — H43.393 VITREOUS FLOATERS OF BOTH EYES: ICD-10-CM

## 2022-04-07 PROCEDURE — 92014 COMPRE OPH EXAM EST PT 1/>: CPT | Performed by: OPHTHALMOLOGY

## 2022-04-07 NOTE — ASSESSMENT & PLAN NOTE
Discussed advanced cataracts in both eyes that are affecting vision but are not surgical at this time. Suggest +2.25 over the counter glasses for reading.

## 2022-04-07 NOTE — PATIENT INSTRUCTIONS
Age-related nuclear cataract of both eyes  Discussed advanced cataracts in both eyes that are affecting vision but are not surgical at this time. Suggest +2.25 over the counter glasses for reading. Vitreous floaters of both eyes  No treatment.

## 2022-04-13 ENCOUNTER — OFFICE VISIT (OUTPATIENT)
Dept: ORTHOPEDICS CLINIC | Facility: CLINIC | Age: 82
End: 2022-04-13
Payer: MEDICARE

## 2022-04-13 ENCOUNTER — HOSPITAL ENCOUNTER (OUTPATIENT)
Dept: GENERAL RADIOLOGY | Facility: HOSPITAL | Age: 82
Discharge: HOME | End: 2022-04-13
Attending: ORTHOPAEDIC SURGERY
Payer: MEDICARE

## 2022-04-13 DIAGNOSIS — M25.551 ACUTE RIGHT HIP PAIN: ICD-10-CM

## 2022-04-13 DIAGNOSIS — M25.551 ACUTE RIGHT HIP PAIN: Primary | ICD-10-CM

## 2022-04-13 DIAGNOSIS — M16.11 PRIMARY OSTEOARTHRITIS OF RIGHT HIP: ICD-10-CM

## 2022-04-13 PROCEDURE — 99214 OFFICE O/P EST MOD 30 MIN: CPT | Performed by: ORTHOPAEDIC SURGERY

## 2022-04-13 PROCEDURE — 73502 X-RAY EXAM HIP UNI 2-3 VIEWS: CPT | Performed by: ORTHOPAEDIC SURGERY

## 2022-04-14 ENCOUNTER — TELEPHONE (OUTPATIENT)
Dept: ORTHOPEDICS CLINIC | Facility: CLINIC | Age: 82
End: 2022-04-14

## 2022-04-14 NOTE — TELEPHONE ENCOUNTER
Type of surgery:  Right Total Hip Arthroplasty via Anterior Approach  Date: 5/10/22  Location: [facility]  Medical Clearance:      *Medical: Yes      *Dental: Yes      *Other:  Prior Authorization Status: Pending  Workers Comp:  Medacta/Viji:  [place]: Yes  POV: 6/1/22

## 2022-04-15 ENCOUNTER — LAB ENCOUNTER (OUTPATIENT)
Dept: LAB | Facility: HOSPITAL | Age: 82
End: 2022-04-15
Attending: INTERNAL MEDICINE
Payer: MEDICARE

## 2022-04-15 ENCOUNTER — OFFICE VISIT (OUTPATIENT)
Dept: INTERNAL MEDICINE CLINIC | Facility: CLINIC | Age: 82
End: 2022-04-15
Payer: MEDICARE

## 2022-04-15 ENCOUNTER — HOSPITAL ENCOUNTER (OUTPATIENT)
Dept: GENERAL RADIOLOGY | Facility: HOSPITAL | Age: 82
Discharge: HOME | End: 2022-04-15
Attending: INTERNAL MEDICINE
Payer: MEDICARE

## 2022-04-15 VITALS
SYSTOLIC BLOOD PRESSURE: 122 MMHG | HEIGHT: 64 IN | BODY MASS INDEX: 25.61 KG/M2 | WEIGHT: 150 LBS | RESPIRATION RATE: 18 BRPM | TEMPERATURE: 98 F | DIASTOLIC BLOOD PRESSURE: 74 MMHG | HEART RATE: 78 BPM

## 2022-04-15 DIAGNOSIS — M79.604 PAIN OF RIGHT LOWER EXTREMITY: ICD-10-CM

## 2022-04-15 DIAGNOSIS — M16.10 HIP ARTHRITIS: ICD-10-CM

## 2022-04-15 DIAGNOSIS — Z01.818 PREOP EXAMINATION: Primary | ICD-10-CM

## 2022-04-15 DIAGNOSIS — I10 ESSENTIAL HYPERTENSION: ICD-10-CM

## 2022-04-15 DIAGNOSIS — Z01.818 PRE-OP EXAMINATION: Primary | ICD-10-CM

## 2022-04-15 DIAGNOSIS — Z01.818 PRE-OP EXAMINATION: ICD-10-CM

## 2022-04-15 LAB
ALBUMIN SERPL-MCNC: 3.5 G/DL (ref 3.4–5)
ALBUMIN/GLOB SERPL: 1.2 {RATIO} (ref 1–2)
ALP LIVER SERPL-CCNC: 107 U/L
ALT SERPL-CCNC: 26 U/L
ANION GAP SERPL CALC-SCNC: 3 MMOL/L (ref 0–18)
APTT PPP: 27.4 SECONDS (ref 23.3–35.6)
AST SERPL-CCNC: 23 U/L (ref 15–37)
BASOPHILS # BLD AUTO: 0.01 X10(3) UL (ref 0–0.2)
BASOPHILS NFR BLD AUTO: 0.1 %
BILIRUB SERPL-MCNC: 0.5 MG/DL (ref 0.1–2)
BILIRUB UR QL: NEGATIVE
BUN BLD-MCNC: 26 MG/DL (ref 7–18)
BUN/CREAT SERPL: 24.5 (ref 10–20)
CALCIUM BLD-MCNC: 9.3 MG/DL (ref 8.5–10.1)
CHLORIDE SERPL-SCNC: 111 MMOL/L (ref 98–112)
CLARITY UR: CLEAR
CO2 SERPL-SCNC: 29 MMOL/L (ref 21–32)
COLOR UR: YELLOW
CREAT BLD-MCNC: 1.06 MG/DL
DEPRECATED RDW RBC AUTO: 48.7 FL (ref 35.1–46.3)
EOSINOPHIL # BLD AUTO: 0.24 X10(3) UL (ref 0–0.7)
EOSINOPHIL NFR BLD AUTO: 2.8 %
ERYTHROCYTE [DISTWIDTH] IN BLOOD BY AUTOMATED COUNT: 13 % (ref 11–15)
FASTING STATUS PATIENT QL REPORTED: NO
GLOBULIN PLAS-MCNC: 2.9 G/DL (ref 2.8–4.4)
GLUCOSE BLD-MCNC: 112 MG/DL (ref 70–99)
GLUCOSE UR-MCNC: NEGATIVE MG/DL
HCT VFR BLD AUTO: 37.1 %
HGB BLD-MCNC: 12 G/DL
HGB UR QL STRIP.AUTO: NEGATIVE
IMM GRANULOCYTES # BLD AUTO: 0.04 X10(3) UL (ref 0–1)
IMM GRANULOCYTES NFR BLD: 0.5 %
INR BLD: 1.01 (ref 0.8–1.2)
KETONES UR-MCNC: NEGATIVE MG/DL
LEUKOCYTE ESTERASE UR QL STRIP.AUTO: NEGATIVE
LYMPHOCYTES # BLD AUTO: 1.61 X10(3) UL (ref 1–4)
LYMPHOCYTES NFR BLD AUTO: 18.9 %
MCH RBC QN AUTO: 32.9 PG (ref 26–34)
MCHC RBC AUTO-ENTMCNC: 32.3 G/DL (ref 31–37)
MCV RBC AUTO: 101.6 FL
MONOCYTES # BLD AUTO: 0.78 X10(3) UL (ref 0.1–1)
MONOCYTES NFR BLD AUTO: 9.2 %
NEUTROPHILS # BLD AUTO: 5.84 X10 (3) UL (ref 1.5–7.7)
NEUTROPHILS # BLD AUTO: 5.84 X10(3) UL (ref 1.5–7.7)
NEUTROPHILS NFR BLD AUTO: 68.5 %
NITRITE UR QL STRIP.AUTO: NEGATIVE
OSMOLALITY SERPL CALC.SUM OF ELEC: 302 MOSM/KG (ref 275–295)
PH UR: 7 [PH] (ref 5–8)
PLATELET # BLD AUTO: 228 10(3)UL (ref 150–450)
POTASSIUM SERPL-SCNC: 4.6 MMOL/L (ref 3.5–5.1)
PROT SERPL-MCNC: 6.4 G/DL (ref 6.4–8.2)
PROT UR-MCNC: NEGATIVE MG/DL
PROTHROMBIN TIME: 13.5 SECONDS (ref 11.6–14.8)
RBC # BLD AUTO: 3.65 X10(6)UL
SODIUM SERPL-SCNC: 143 MMOL/L (ref 136–145)
SP GR UR STRIP: 1.02 (ref 1–1.03)
UROBILINOGEN UR STRIP-ACNC: <2
VIT C UR-MCNC: NEGATIVE MG/DL
WBC # BLD AUTO: 8.5 X10(3) UL (ref 4–11)

## 2022-04-15 PROCEDURE — 85025 COMPLETE CBC W/AUTO DIFF WBC: CPT

## 2022-04-15 PROCEDURE — 87081 CULTURE SCREEN ONLY: CPT

## 2022-04-15 PROCEDURE — 85730 THROMBOPLASTIN TIME PARTIAL: CPT

## 2022-04-15 PROCEDURE — 36415 COLL VENOUS BLD VENIPUNCTURE: CPT

## 2022-04-15 PROCEDURE — 93005 ELECTROCARDIOGRAM TRACING: CPT

## 2022-04-15 PROCEDURE — 71046 X-RAY EXAM CHEST 2 VIEWS: CPT | Performed by: INTERNAL MEDICINE

## 2022-04-15 PROCEDURE — 81003 URINALYSIS AUTO W/O SCOPE: CPT | Performed by: INTERNAL MEDICINE

## 2022-04-15 PROCEDURE — 85610 PROTHROMBIN TIME: CPT

## 2022-04-15 PROCEDURE — 80053 COMPREHEN METABOLIC PANEL: CPT

## 2022-04-15 PROCEDURE — 93010 ELECTROCARDIOGRAM REPORT: CPT | Performed by: INTERNAL MEDICINE

## 2022-04-15 PROCEDURE — 99214 OFFICE O/P EST MOD 30 MIN: CPT | Performed by: INTERNAL MEDICINE

## 2022-05-05 ENCOUNTER — TELEPHONE (OUTPATIENT)
Dept: ORTHOPEDICS CLINIC | Facility: CLINIC | Age: 82
End: 2022-05-05

## 2022-05-05 NOTE — TELEPHONE ENCOUNTER
Pt submitted FMLA forms at [facility] . МАРИЯ MARIO completed, $25 fee paid. Scanned, took orig to Forms.

## 2022-05-07 ENCOUNTER — LAB ENCOUNTER (OUTPATIENT)
Dept: LAB | Facility: HOSPITAL | Age: 82
End: 2022-05-07
Attending: ORTHOPAEDIC SURGERY
Payer: MEDICARE

## 2022-05-07 DIAGNOSIS — Z01.818 PRE-OP TESTING: ICD-10-CM

## 2022-05-07 LAB
ANTIBODY SCREEN: NEGATIVE
RH BLOOD TYPE: POSITIVE
RH BLOOD TYPE: POSITIVE

## 2022-05-07 PROCEDURE — 36415 COLL VENOUS BLD VENIPUNCTURE: CPT

## 2022-05-07 PROCEDURE — 86900 BLOOD TYPING SEROLOGIC ABO: CPT

## 2022-05-07 PROCEDURE — 86901 BLOOD TYPING SEROLOGIC RH(D): CPT

## 2022-05-07 PROCEDURE — 86850 RBC ANTIBODY SCREEN: CPT

## 2022-05-08 LAB — SARS-COV-2 RNA RESP QL NAA+PROBE: NOT DETECTED

## 2022-05-10 ENCOUNTER — APPOINTMENT (OUTPATIENT)
Dept: GENERAL RADIOLOGY | Facility: HOSPITAL | Age: 82
End: 2022-05-10
Attending: PHYSICIAN ASSISTANT
Payer: MEDICARE

## 2022-05-10 ENCOUNTER — ANESTHESIA (OUTPATIENT)
Dept: SURGERY | Facility: HOSPITAL | Age: 82
End: 2022-05-10
Payer: MEDICARE

## 2022-05-10 ENCOUNTER — HOSPITAL ENCOUNTER (INPATIENT)
Facility: HOSPITAL | Age: 82
LOS: 2 days | Discharge: HOME HEALTH | End: 2022-05-12
Attending: ORTHOPAEDIC SURGERY | Admitting: ORTHOPAEDIC SURGERY
Payer: MEDICARE

## 2022-05-10 ENCOUNTER — APPOINTMENT (OUTPATIENT)
Dept: GENERAL RADIOLOGY | Facility: HOSPITAL | Age: 82
End: 2022-05-10
Attending: ORTHOPAEDIC SURGERY
Payer: MEDICARE

## 2022-05-10 ENCOUNTER — ANESTHESIA EVENT (OUTPATIENT)
Dept: SURGERY | Facility: HOSPITAL | Age: 82
End: 2022-05-10
Payer: MEDICARE

## 2022-05-10 DIAGNOSIS — M16.11 PRIMARY OSTEOARTHRITIS OF RIGHT HIP: ICD-10-CM

## 2022-05-10 DIAGNOSIS — Z01.818 PRE-OP TESTING: Primary | ICD-10-CM

## 2022-05-10 LAB
DEPRECATED RDW RBC AUTO: 49.6 FL (ref 35.1–46.3)
ERYTHROCYTE [DISTWIDTH] IN BLOOD BY AUTOMATED COUNT: 13 % (ref 11–15)
HCT VFR BLD AUTO: 33.3 %
HGB BLD-MCNC: 10.6 G/DL
MCH RBC QN AUTO: 33 PG (ref 26–34)
MCHC RBC AUTO-ENTMCNC: 31.8 G/DL (ref 31–37)
MCV RBC AUTO: 103.7 FL
PLATELET # BLD AUTO: 183 10(3)UL (ref 150–450)
RBC # BLD AUTO: 3.21 X10(6)UL
WBC # BLD AUTO: 8.4 X10(3) UL (ref 4–11)

## 2022-05-10 PROCEDURE — BQ101ZZ FLUOROSCOPY OF RIGHT HIP USING LOW OSMOLAR CONTRAST: ICD-10-PCS | Performed by: ORTHOPAEDIC SURGERY

## 2022-05-10 PROCEDURE — 76000 FLUOROSCOPY <1 HR PHYS/QHP: CPT | Performed by: ORTHOPAEDIC SURGERY

## 2022-05-10 PROCEDURE — 99232 SBSQ HOSP IP/OBS MODERATE 35: CPT | Performed by: HOSPITALIST

## 2022-05-10 PROCEDURE — 73502 X-RAY EXAM HIP UNI 2-3 VIEWS: CPT | Performed by: PHYSICIAN ASSISTANT

## 2022-05-10 PROCEDURE — 0SR90J9 REPLACEMENT OF RIGHT HIP JOINT WITH SYNTHETIC SUBSTITUTE, CEMENTED, OPEN APPROACH: ICD-10-PCS | Performed by: ORTHOPAEDIC SURGERY

## 2022-05-10 DEVICE — TOTAL HIP W/CER HEAD: Type: IMPLANTABLE DEVICE | Status: FUNCTIONAL

## 2022-05-10 DEVICE — IMPLANTABLE DEVICE
Type: IMPLANTABLE DEVICE | Site: HIP | Status: FUNCTIONAL
Brand: QUIK-USE®

## 2022-05-10 DEVICE — ACETABULAR SHELL Ø52 TWO HOLES
Type: IMPLANTABLE DEVICE | Site: HIP | Status: FUNCTIONAL
Brand: MPACT ACETABULAR SYSTEM

## 2022-05-10 DEVICE — CANCELLOUS BONE SCREW FLAT HEAD Ø 6,5 L30
Type: IMPLANTABLE DEVICE | Site: HIP | Status: FUNCTIONAL
Brand: MPACT ACETABULAR SYSTEM

## 2022-05-10 DEVICE — FEMORAL HEAD Ø 36 SIZE M
Type: IMPLANTABLE DEVICE | Site: HIP | Status: FUNCTIONAL
Brand: MECTACER BIOLOX DELTA FEMORAL BALL HEAD

## 2022-05-10 DEVICE — REFOBACIN BC R 1X40 US: Type: IMPLANTABLE DEVICE | Site: HIP | Status: FUNCTIONAL

## 2022-05-10 DEVICE — FLAT PE  HC LINER Ø 36 / E
Type: IMPLANTABLE DEVICE | Site: HIP | Status: FUNCTIONAL
Brand: MPACT ACETABULAR SYSTEM

## 2022-05-10 RX ORDER — EPHEDRINE SULFATE 50 MG/ML
INJECTION INTRAVENOUS AS NEEDED
Status: DISCONTINUED | OUTPATIENT
Start: 2022-05-10 | End: 2022-05-10 | Stop reason: SURG

## 2022-05-10 RX ORDER — MORPHINE SULFATE 10 MG/ML
6 INJECTION, SOLUTION INTRAMUSCULAR; INTRAVENOUS EVERY 10 MIN PRN
Status: DISCONTINUED | OUTPATIENT
Start: 2022-05-10 | End: 2022-05-10 | Stop reason: HOSPADM

## 2022-05-10 RX ORDER — POLYETHYLENE GLYCOL 3350 17 G/17G
17 POWDER, FOR SOLUTION ORAL DAILY PRN
Status: DISCONTINUED | OUTPATIENT
Start: 2022-05-10 | End: 2022-05-12

## 2022-05-10 RX ORDER — NALOXONE HYDROCHLORIDE 0.4 MG/ML
80 INJECTION, SOLUTION INTRAMUSCULAR; INTRAVENOUS; SUBCUTANEOUS AS NEEDED
Status: DISCONTINUED | OUTPATIENT
Start: 2022-05-10 | End: 2022-05-10 | Stop reason: HOSPADM

## 2022-05-10 RX ORDER — METOPROLOL SUCCINATE 50 MG/1
50 TABLET, EXTENDED RELEASE ORAL DAILY
Status: DISCONTINUED | OUTPATIENT
Start: 2022-05-11 | End: 2022-05-12

## 2022-05-10 RX ORDER — DIPHENHYDRAMINE HCL 25 MG
25 CAPSULE ORAL EVERY 4 HOURS PRN
Status: DISCONTINUED | OUTPATIENT
Start: 2022-05-10 | End: 2022-05-12

## 2022-05-10 RX ORDER — HYDROMORPHONE HYDROCHLORIDE 1 MG/ML
0.4 INJECTION, SOLUTION INTRAMUSCULAR; INTRAVENOUS; SUBCUTANEOUS EVERY 2 HOUR PRN
Status: DISCONTINUED | OUTPATIENT
Start: 2022-05-10 | End: 2022-05-11

## 2022-05-10 RX ORDER — FAMOTIDINE 10 MG/ML
20 INJECTION, SOLUTION INTRAVENOUS 2 TIMES DAILY
Status: DISCONTINUED | OUTPATIENT
Start: 2022-05-10 | End: 2022-05-12

## 2022-05-10 RX ORDER — MELATONIN
325
Status: DISCONTINUED | OUTPATIENT
Start: 2022-05-11 | End: 2022-05-12

## 2022-05-10 RX ORDER — SODIUM CHLORIDE, SODIUM LACTATE, POTASSIUM CHLORIDE, CALCIUM CHLORIDE 600; 310; 30; 20 MG/100ML; MG/100ML; MG/100ML; MG/100ML
INJECTION, SOLUTION INTRAVENOUS CONTINUOUS
Status: DISCONTINUED | OUTPATIENT
Start: 2022-05-10 | End: 2022-05-10 | Stop reason: HOSPADM

## 2022-05-10 RX ORDER — ONDANSETRON 2 MG/ML
4 INJECTION INTRAMUSCULAR; INTRAVENOUS EVERY 4 HOURS PRN
Status: DISPENSED | OUTPATIENT
Start: 2022-05-10 | End: 2022-05-12

## 2022-05-10 RX ORDER — ASPIRIN 325 MG
325 TABLET, DELAYED RELEASE (ENTERIC COATED) ORAL 2 TIMES DAILY
Status: DISCONTINUED | OUTPATIENT
Start: 2022-05-10 | End: 2022-05-12

## 2022-05-10 RX ORDER — HYDROMORPHONE HYDROCHLORIDE 1 MG/ML
0.2 INJECTION, SOLUTION INTRAMUSCULAR; INTRAVENOUS; SUBCUTANEOUS EVERY 2 HOUR PRN
Status: DISCONTINUED | OUTPATIENT
Start: 2022-05-10 | End: 2022-05-11

## 2022-05-10 RX ORDER — DEXTROSE, SODIUM CHLORIDE, AND POTASSIUM CHLORIDE 5; .45; .15 G/100ML; G/100ML; G/100ML
INJECTION INTRAVENOUS CONTINUOUS
Status: DISCONTINUED | OUTPATIENT
Start: 2022-05-10 | End: 2022-05-11

## 2022-05-10 RX ORDER — CEFAZOLIN SODIUM/WATER 2 G/20 ML
2 SYRINGE (ML) INTRAVENOUS EVERY 8 HOURS
Status: COMPLETED | OUTPATIENT
Start: 2022-05-10 | End: 2022-05-10

## 2022-05-10 RX ORDER — TRANEXAMIC ACID 10 MG/ML
INJECTION, SOLUTION INTRAVENOUS AS NEEDED
Status: DISCONTINUED | OUTPATIENT
Start: 2022-05-10 | End: 2022-05-10 | Stop reason: SURG

## 2022-05-10 RX ORDER — HYDROMORPHONE HYDROCHLORIDE 1 MG/ML
0.4 INJECTION, SOLUTION INTRAMUSCULAR; INTRAVENOUS; SUBCUTANEOUS EVERY 5 MIN PRN
Status: DISCONTINUED | OUTPATIENT
Start: 2022-05-10 | End: 2022-05-10 | Stop reason: HOSPADM

## 2022-05-10 RX ORDER — METOPROLOL TARTRATE 5 MG/5ML
2.5 INJECTION INTRAVENOUS ONCE
Status: DISCONTINUED | OUTPATIENT
Start: 2022-05-10 | End: 2022-05-10 | Stop reason: HOSPADM

## 2022-05-10 RX ORDER — HYDROMORPHONE HYDROCHLORIDE 1 MG/ML
0.2 INJECTION, SOLUTION INTRAMUSCULAR; INTRAVENOUS; SUBCUTANEOUS EVERY 5 MIN PRN
Status: DISCONTINUED | OUTPATIENT
Start: 2022-05-10 | End: 2022-05-10 | Stop reason: HOSPADM

## 2022-05-10 RX ORDER — BUPIVACAINE HYDROCHLORIDE 7.5 MG/ML
INJECTION, SOLUTION INTRASPINAL
Status: COMPLETED | OUTPATIENT
Start: 2022-05-10 | End: 2022-05-10

## 2022-05-10 RX ORDER — SODIUM PHOSPHATE, DIBASIC AND SODIUM PHOSPHATE, MONOBASIC 7; 19 G/133ML; G/133ML
1 ENEMA RECTAL ONCE AS NEEDED
Status: DISCONTINUED | OUTPATIENT
Start: 2022-05-10 | End: 2022-05-12

## 2022-05-10 RX ORDER — LIDOCAINE HYDROCHLORIDE 10 MG/ML
INJECTION, SOLUTION INFILTRATION; PERINEURAL
Status: COMPLETED | OUTPATIENT
Start: 2022-05-10 | End: 2022-05-10

## 2022-05-10 RX ORDER — HYDROMORPHONE HYDROCHLORIDE 1 MG/ML
0.6 INJECTION, SOLUTION INTRAMUSCULAR; INTRAVENOUS; SUBCUTANEOUS EVERY 5 MIN PRN
Status: DISCONTINUED | OUTPATIENT
Start: 2022-05-10 | End: 2022-05-10 | Stop reason: HOSPADM

## 2022-05-10 RX ORDER — ACETAMINOPHEN 500 MG
1000 TABLET ORAL ONCE
Status: DISCONTINUED | OUTPATIENT
Start: 2022-05-10 | End: 2022-05-10

## 2022-05-10 RX ORDER — MIDAZOLAM HYDROCHLORIDE 1 MG/ML
INJECTION INTRAMUSCULAR; INTRAVENOUS AS NEEDED
Status: DISCONTINUED | OUTPATIENT
Start: 2022-05-10 | End: 2022-05-10 | Stop reason: SURG

## 2022-05-10 RX ORDER — DIPHENHYDRAMINE HYDROCHLORIDE 50 MG/ML
25 INJECTION INTRAMUSCULAR; INTRAVENOUS ONCE AS NEEDED
Status: ACTIVE | OUTPATIENT
Start: 2022-05-10 | End: 2022-05-10

## 2022-05-10 RX ORDER — MORPHINE SULFATE 1 MG/ML
INJECTION, SOLUTION EPIDURAL; INTRATHECAL; INTRAVENOUS
Status: COMPLETED | OUTPATIENT
Start: 2022-05-10 | End: 2022-05-10

## 2022-05-10 RX ORDER — DOCUSATE SODIUM 100 MG/1
100 CAPSULE, LIQUID FILLED ORAL 2 TIMES DAILY
Status: DISCONTINUED | OUTPATIENT
Start: 2022-05-10 | End: 2022-05-12

## 2022-05-10 RX ORDER — PROCHLORPERAZINE EDISYLATE 5 MG/ML
10 INJECTION INTRAMUSCULAR; INTRAVENOUS EVERY 6 HOURS PRN
Status: ACTIVE | OUTPATIENT
Start: 2022-05-10 | End: 2022-05-12

## 2022-05-10 RX ORDER — DIPHENHYDRAMINE HYDROCHLORIDE 50 MG/ML
12.5 INJECTION INTRAMUSCULAR; INTRAVENOUS EVERY 4 HOURS PRN
Status: DISCONTINUED | OUTPATIENT
Start: 2022-05-10 | End: 2022-05-12

## 2022-05-10 RX ORDER — CEFAZOLIN SODIUM/WATER 2 G/20 ML
2 SYRINGE (ML) INTRAVENOUS ONCE
Status: DISCONTINUED | OUTPATIENT
Start: 2022-05-10 | End: 2022-05-10 | Stop reason: HOSPADM

## 2022-05-10 RX ORDER — ACETAMINOPHEN 500 MG
1000 TABLET ORAL ONCE
Status: COMPLETED | OUTPATIENT
Start: 2022-05-10 | End: 2022-05-10

## 2022-05-10 RX ORDER — MORPHINE SULFATE 4 MG/ML
4 INJECTION, SOLUTION INTRAMUSCULAR; INTRAVENOUS EVERY 10 MIN PRN
Status: DISCONTINUED | OUTPATIENT
Start: 2022-05-10 | End: 2022-05-10 | Stop reason: HOSPADM

## 2022-05-10 RX ORDER — GLYCOPYRROLATE 0.2 MG/ML
INJECTION, SOLUTION INTRAMUSCULAR; INTRAVENOUS AS NEEDED
Status: DISCONTINUED | OUTPATIENT
Start: 2022-05-10 | End: 2022-05-10 | Stop reason: SURG

## 2022-05-10 RX ORDER — SENNOSIDES 8.6 MG
17.2 TABLET ORAL NIGHTLY
Status: DISCONTINUED | OUTPATIENT
Start: 2022-05-10 | End: 2022-05-12

## 2022-05-10 RX ORDER — FAMOTIDINE 20 MG/1
20 TABLET, FILM COATED ORAL 2 TIMES DAILY
Status: DISCONTINUED | OUTPATIENT
Start: 2022-05-10 | End: 2022-05-12

## 2022-05-10 RX ORDER — MORPHINE SULFATE 4 MG/ML
2 INJECTION, SOLUTION INTRAMUSCULAR; INTRAVENOUS EVERY 10 MIN PRN
Status: DISCONTINUED | OUTPATIENT
Start: 2022-05-10 | End: 2022-05-10 | Stop reason: HOSPADM

## 2022-05-10 RX ORDER — HYDROCODONE BITARTRATE AND ACETAMINOPHEN 5; 325 MG/1; MG/1
1 TABLET ORAL EVERY 4 HOURS PRN
Status: DISCONTINUED | OUTPATIENT
Start: 2022-05-10 | End: 2022-05-11

## 2022-05-10 RX ORDER — BISACODYL 10 MG
10 SUPPOSITORY, RECTAL RECTAL
Status: DISCONTINUED | OUTPATIENT
Start: 2022-05-10 | End: 2022-05-12

## 2022-05-10 RX ORDER — PHENYLEPHRINE HCL 10 MG/ML
VIAL (ML) INJECTION AS NEEDED
Status: DISCONTINUED | OUTPATIENT
Start: 2022-05-10 | End: 2022-05-10 | Stop reason: SURG

## 2022-05-10 RX ORDER — SODIUM CHLORIDE, SODIUM LACTATE, POTASSIUM CHLORIDE, CALCIUM CHLORIDE 600; 310; 30; 20 MG/100ML; MG/100ML; MG/100ML; MG/100ML
INJECTION, SOLUTION INTRAVENOUS CONTINUOUS
Status: DISCONTINUED | OUTPATIENT
Start: 2022-05-10 | End: 2022-05-11

## 2022-05-10 RX ADMIN — MORPHINE SULFATE 0.3 MG: 1 INJECTION, SOLUTION EPIDURAL; INTRATHECAL; INTRAVENOUS at 09:33:00

## 2022-05-10 RX ADMIN — PHENYLEPHRINE HCL 100 MCG: 10 MG/ML VIAL (ML) INJECTION at 09:01:00

## 2022-05-10 RX ADMIN — EPHEDRINE SULFATE 2.5 MG: 50 INJECTION INTRAVENOUS at 09:14:00

## 2022-05-10 RX ADMIN — MIDAZOLAM HYDROCHLORIDE 2 MG: 1 INJECTION INTRAMUSCULAR; INTRAVENOUS at 08:40:00

## 2022-05-10 RX ADMIN — TRANEXAMIC ACID 1000 MG: 10 INJECTION, SOLUTION INTRAVENOUS at 09:10:00

## 2022-05-10 RX ADMIN — LIDOCAINE HYDROCHLORIDE 3 ML: 10 INJECTION, SOLUTION INFILTRATION; PERINEURAL at 09:33:00

## 2022-05-10 RX ADMIN — PHENYLEPHRINE HCL 100 MCG: 10 MG/ML VIAL (ML) INJECTION at 09:14:00

## 2022-05-10 RX ADMIN — EPHEDRINE SULFATE 2.5 MG: 50 INJECTION INTRAVENOUS at 09:03:00

## 2022-05-10 RX ADMIN — EPHEDRINE SULFATE 2.5 MG: 50 INJECTION INTRAVENOUS at 09:11:00

## 2022-05-10 RX ADMIN — PHENYLEPHRINE HCL 100 MCG: 10 MG/ML VIAL (ML) INJECTION at 08:54:00

## 2022-05-10 RX ADMIN — GLYCOPYRROLATE 0.2 MG: 0.2 INJECTION, SOLUTION INTRAMUSCULAR; INTRAVENOUS at 09:33:00

## 2022-05-10 RX ADMIN — BUPIVACAINE HYDROCHLORIDE 1.4 ML: 7.5 INJECTION, SOLUTION INTRASPINAL at 09:33:00

## 2022-05-10 RX ADMIN — SODIUM CHLORIDE, SODIUM LACTATE, POTASSIUM CHLORIDE, CALCIUM CHLORIDE: 600; 310; 30; 20 INJECTION, SOLUTION INTRAVENOUS at 11:03:00

## 2022-05-10 RX ADMIN — SODIUM CHLORIDE, SODIUM LACTATE, POTASSIUM CHLORIDE, CALCIUM CHLORIDE: 600; 310; 30; 20 INJECTION, SOLUTION INTRAVENOUS at 08:34:00

## 2022-05-10 RX ADMIN — PHENYLEPHRINE HCL 100 MCG: 10 MG/ML VIAL (ML) INJECTION at 09:21:00

## 2022-05-10 NOTE — BRIEF OP NOTE
Pre-Operative Diagnosis: Primary osteoarthritis of right hip [M16.11]     Post-Operative Diagnosis: Primary osteoarthritis of right hip [M16.11]      Procedure Performed:   Right total Hip arthroplasty via anterior approach    Surgeon(s) and Role:     * Lambert Stanton MD - Primary    Assistant(s):  Surgical Assistant.: Geovani Sarah  PA: Reg Haas PA-C     Surgical Findings: see dx     Specimen: path     Estimated Blood Loss: Blood Output: 150 mL (5/10/2022 10:33 AM)        Evonne Banegas MD  5/10/2022  10:36 AM

## 2022-05-10 NOTE — INTERVAL H&P NOTE
Clinical Nutrition       Patient Name: Cal Frausto  YOB: 1945  MRN: [id number]  Date of Encounter: 12/05/21 09:20 EST  Admission date: 12/4/2021      Reason for Visit   Identified at risk by screening criteria, MST score 2+, Reduced oral intake      EMR  Reviewed   Yes    Current weight: 165 lbs (stated 12/4) - accuracy unknown  EMR wt hx (MDOV):  178 lbs (11/15/21)  187 lbs (10/19/21)  Weight change: ?Unintentional weight loss of 13 lbs (7.3%) x 3 weeks.     Reported/Observed/Food/Nutrition Related - Comments   Patient reports good/normal appetite and PO intake prior to admission. However, reports some unintentional weight loss of (estimated) 5-15 lbs during the past 1.5-2 months and he suspects this is a result of blood loss. NKFA. Denies difficulty chewing/swallowing. Reports good appetite. Declines ONS. Feels he will eat fine.    EGD planned.       Current Nutrition Prescription     NPO Diet    Average Intake from Charting: insufficient data    Actions     Follow treatment progress, Care plan reviewed, Await begin PO, Supplement offered/refused      Monitor Per Protocol      Deja Hughes, RD  Time Spent: 30 min         Pre-op Diagnosis: Primary osteoarthritis of right hip [M16.11]    The above referenced H&P was reviewed by Solis Vela MD on 5/10/2022, the patient was examined and no significant changes have occurred in the patient's condition since the H&P was performed. I discussed with the patient and/or legal representative the potential benefits, risks and side effects of this procedure; the likelihood of the patient achieving goals; and potential problems that might occur during recuperation. I discussed reasonable alternatives to the procedure, including risks, benefits and side effects related to the alternatives and risks related to not receiving this procedure. We will proceed with procedure as planned.

## 2022-05-10 NOTE — OPERATIVE REPORT
OPERATIVE REPORT     PREOPERATIVE DIAGNOSIS:  Osteoarthritis, right hip. POSTOPERATIVE DIAGNOSIS:  Osteoarthritis, right hip. PROCEDURE:  right total hip arthroplasty via anterior approach with C-arm control. ASSISTANT:  Funmilayo Cardoso PA-C.       INDICATIONS:  The patient is a [de-identified]year old female with advanced osteoarthritis of the right hip that has not responded to conservative management. After discussion of the options for treatment, he requested the above procedure. Our discussion included, but was not limited to, the potential risks of anesthetic complication, infection, DVT or PE, leg length inequality, periprosthetic fracture, injuries to local nerves or blood vessels, bleeding requiring transfusion, periprosthetic fracture, as well as the risk of unanticipated perioperative medical or orthopedic complications. Written consent was obtained. OPERATIVE TECHNIQUE:  The patient was brought to the operating room and placed under spinal anesthesia. Ancef, tranexamic acid, and vancomycin were administered prophylactically. A time-out was performed. The right hip and lower extremity were prepped and draped in the usual sterile fashion. An oblique incision was made just distal and lateral to the ASIS and carried to the fascia over the TFL. The fascia was divided in line with the skin incision. The TFL was reflected posteriorly within its sheath, and the rectus was reflected anteriorly. The anterior circumflex femoral vessels were identified, coagulated, and ligated, and an anterior capsulotomy was performed. A femoral neck osteotomy was made at the appropriate level and orientation. The femoral head was removed. A Charnley retractor was placed within the hip capsule to facilitate exposure, and the acetabular labrum was excised. The acetabulum was reamed sequentially to 52 mm, at which point good bleeding subchondral bone was obtained.   Depth of reaming as well as inclination and version were verified with the C-arm. A Medacta Mpact 2-hole acetabulum was inserted in the appropriate version and inclination and obtained a good press-fit. A screw was placed in the ilium for supplementary fixation and obtained excellent purchase. A highly cross-linked flat 36mm ID liner was locked into the shell, and attention was turned to the femur. The pubofemoral and ischiofemoral ligaments were released, and the femur was externally rotated. With no traction applied, the leg was extended externally, rotated, and adducted. Standard retractors were placed around the proximal femur, and a box osteotome was used to open the femoral canal, followed by the canal opening broach. The femur was then broached up to accept a Medacta AMIS size 4 broach, I elected to cement the femoral stem because of the patient's bone quality, age, and proximal femoral geometry, which I felt was not conducive to a safe press-fit. .  The standard trial neck was applied, and a trial reduction was performed with a 36 mm 0 head. Check x-rays were taken with the C-arm and overlaid with the preoperative views, verifying appropriate leg length and offset. The trial components were then removed, and the actual #3 stem was inserted in the femur. Standard cement technique was used with a vacuum mixed pressurized [place] G cement. I used a size 3 stem to allow for an adequate cement mantle, downsizing from the size 4 broach. .  The  ceramic head matching the trial size was applied to a clean dry trunnion, and the hip was reduced. A final radiograph was taken and verified excellent re-creation of leg length and offset. The wound was irrigated and closed in routine fashion in layers. Sponge and instrument counts were correct at the end of the procedure. Estimated blood loss was 150 mL. There were no complications. The routine specimens were sent to Pathology.   The patient was stable under the care of Anesthesia at the completion of the procedure.      Evonne Banegas MD 5/10/2022

## 2022-05-10 NOTE — PLAN OF CARE
Pt is aox 4, resting in the bed. Voiding with Jacob . SCD  for DVT prophylaxis. Denies pain. Dressing CDI. Plan to  TBD. PT eval pending. Bed in lowest position, call light and personal possessions within reach. Pt instructed to call for assistance before getting up.

## 2022-05-10 NOTE — ANESTHESIA PROCEDURE NOTES
Spinal Block    Date/Time: 5/10/2022 9:33 AM  Performed by: Chyna Bethea CRNA  Authorized by: Petrona Groves MD       General Information and Staff    Start Time:  5/10/2022 8:40 AM  End Time:  5/10/2022 8:45 AM  Anesthesiologist:  Petrona Groves MD  CRNA:  Chyna Bethea CRNA  Performed by:   Anesthesiologist  Site identification: surface landmarks    Reason for Block: at surgeon's request and surgical anesthesia    Preanesthetic Checklist: patient identified, IV checked, risks and benefits discussed, monitors and equipment checked, pre-op evaluation, timeout performed, anesthesia consent and sterile technique used      Procedure Details    Patient Position:  Sitting  Prep: ChloraPrep    Monitoring:  Cardiac monitor  Approach:  Midline  Location:  L3-4  Injection Technique:  Single-shot    Needle    Needle Type:  Pencil-tip  Needle Gauge:  24 G  Needle Length:  3.5 in    Assessment    Sensory Level:  T10  Events: clear CSF, CSF aspirated, well tolerated and blood negative      Additional Comments

## 2022-05-11 LAB
HCT VFR BLD AUTO: 30.8 %
HGB BLD-MCNC: 10 G/DL

## 2022-05-11 PROCEDURE — 99232 SBSQ HOSP IP/OBS MODERATE 35: CPT | Performed by: HOSPITALIST

## 2022-05-11 RX ORDER — HYDROCODONE BITARTRATE AND ACETAMINOPHEN 5; 325 MG/1; MG/1
0.5 TABLET ORAL EVERY 4 HOURS PRN
Status: DISCONTINUED | OUTPATIENT
Start: 2022-05-11 | End: 2022-05-12

## 2022-05-11 RX ORDER — ACETAMINOPHEN 325 MG/1
650 TABLET ORAL EVERY 6 HOURS PRN
Status: DISCONTINUED | OUTPATIENT
Start: 2022-05-11 | End: 2022-05-12

## 2022-05-11 NOTE — TELEPHONE ENCOUNTER
Corby     Please sign off on form: FMLA  -Highlight the patient and hit \"Chart\" button. -In Chart Review, w/in the Encounter tab - click 1 time on the Telephone call encounter for 5/5/22 Scroll down the telephone encounter.  -Click \"scan on\" blue Hyperlink under \"Media\" heading for FMLA Dr Emily Corbin 5/11/22  w/in the telephone enc.  -Click on Acknowledge button at the bottom right corner and left-click onto image, signature stamp appears and drag signature to Provider signature line. Stamp will turn blue. Close window.      Thank you,  Artist Norma

## 2022-05-11 NOTE — CM/SW NOTE
Department  notified of request for evonne LAURENT referrals started. Assigned CM/SW to follow up with pt/family on further discharge planning.      Drenda Forward

## 2022-05-11 NOTE — PLAN OF CARE
Pt is A&O x4. Surgical dressing clean/dry/intact. Prevena wound vac in place. WBAT. Up with 1-2 assist and walker. Given Zofran for nausea. Denies needing any pain medication. Voiding via h[place]man. D.C. in the morning, check void by 3 PM. Call light within reach. Safety precaution in place. D.C. plan is TBD. Problem: Patient Centered Care  Goal: Patient preferences are identified and integrated in the patient's plan of care  Description: Interventions:  - What would you like us to know as we care for you? I live with my niece.    - Provide timely, complete, and accurate information to patient/family  - Incorporate patient and family knowledge, values, beliefs, and cultural backgrounds into the planning and delivery of care  - Encourage patient/family to participate in care and decision-making at the level they choose  - Honor patient and family perspectives and choices  5/11/2022 0022 by Natasha Damon RN  Outcome: Progressing     Problem: Patient/Family Goals  Goal: Patient/Family Long Term Goal  Description: Patient's Long Term Goal: Return home    Interventions:  - Follows plan of care  - PT/OT  - See additional Care Plan goals for specific interventions  5/11/2022 0022 by Natasha Damon RN  Outcome: Progressing    Goal: Patient/Family Short Term Goal  Description: Patient's Short Term Goal: Ambulating without pain    Interventions:   - Pain medication  - PT  - See additional Care Plan goals for specific interventions  5/11/2022 0022 by Natasha Damon RN  Outcome: Progressing       Problem: PAIN - ADULT  Goal: Verbalizes/displays adequate comfort level or patient's stated pain goal  Description: INTERVENTIONS:  - Encourage pt to monitor pain and request assistance  - Assess pain using appropriate pain scale  - Administer analgesics based on type and severity of pain and evaluate response  - Implement non-pharmacological measures as appropriate and evaluate response  - Consider cultural and social influences on pain and pain management  - Manage/alleviate anxiety  - Utilize distraction and/or relaxation techniques  - Monitor for opioid side effects  - Notify MD/LIP if interventions unsuccessful or patient reports new pain  - Anticipate increased pain with activity and pre-medicate as appropriate  Outcome: Progressing     Problem: RISK FOR INFECTION - ADULT  Goal: Absence of fever/infection during anticipated neutropenic period  Description: INTERVENTIONS  - Monitor WBC  - Administer growth factors as ordered  - Implement neutropenic guidelines  Outcome: Progressing     Problem: SAFETY ADULT - FALL  Goal: Free from fall injury  Description: INTERVENTIONS:  - Assess pt frequently for physical needs  - Identify cognitive and physical deficits and behaviors that affect risk of falls.   - [place] fall precautions as indicated by assessment.  - Educate pt/family on patient safety including physical limitations  - Instruct pt to call for assistance with activity based on assessment  - Modify environment to reduce risk of injury  - Provide assistive devices as appropriate  - Consider OT/PT consult to assist with strengthening/mobility  - Encourage toileting schedule  Outcome: Progressing     Problem: DISCHARGE PLANNING  Goal: Discharge to home or other facility with appropriate resources  Description: INTERVENTIONS:  - Identify barriers to discharge w/pt and caregiver  - Include patient/family/discharge partner in discharge planning  - Arrange for needed discharge resources and transportation as appropriate  - Identify discharge learning needs (meds, wound care, etc)  - Arrange for interpreters to assist at discharge as needed  - Consider post-discharge preferences of patient/family/discharge partner  - Complete POLST form as appropriate  - Assess patient's ability to be responsible for managing their own health  - Refer to Case Management Department for coordinating discharge planning if the patient needs post-hospital services based on physician/LIP order or complex needs related to functional status, cognitive ability or social support system  Outcome: Progressing

## 2022-05-11 NOTE — PLAN OF CARE
Patient is A&Ox4 and on room air. Ambulating WBAT with walker x1 assist.  Voiding in the bathroom. Pain is managed with medication. 0.5 of Norco given at 1730. Patient wants to be discharged to home with 24hr supervision with niece. Is staying for another day to educated niece. Patient is anticipated for discharge tomorrow. Problem: Patient Centered Care  Goal: Patient preferences are identified and integrated in the patient's plan of care  Description: Interventions:  - Provide timely, complete, and accurate information to patient/family  - Incorporate patient and family knowledge, values, beliefs, and cultural backgrounds into the planning and delivery of care  - Encourage patient/family to participate in care and decision-making at the level they choose  - Honor patient and family perspectives and choices  Outcome: Progressing     Problem: Patient/Family Goals  Goal: Patient/Family Long Term Goal  Description: Patient's Long Term Goal: No complication of the right hip. Interventions:  - Pain management with medication.  - Working with PT/OT. - Monitoring for signs of infection.  - See additional Care Plan goals for specific interventions  Outcome: Progressing  Goal: Patient/Family Short Term Goal  Description: Patient's Short Term Goal:Discharge to home with niece. Interventions:   - Pain management with medication.  - Working with PT/OT.   - Monitoring for signs of infection.  - See additional Care Plan goals for specific interventions  Outcome: Progressing     Problem: PAIN - ADULT  Goal: Verbalizes/displays adequate comfort level or patient's stated pain goal  Description: INTERVENTIONS:  - Encourage pt to monitor pain and request assistance  - Assess pain using appropriate pain scale  - Administer analgesics based on type and severity of pain and evaluate response  - Implement non-pharmacological measures as appropriate and evaluate response  - Consider cultural and social influences on pain and pain management  - Manage/alleviate anxiety  - Utilize distraction and/or relaxation techniques  - Monitor for opioid side effects  - Notify MD/LIP if interventions unsuccessful or patient reports new pain  - Anticipate increased pain with activity and pre-medicate as appropriate  Outcome: Progressing     Problem: RISK FOR INFECTION - ADULT  Goal: Absence of fever/infection during anticipated neutropenic period  Description: INTERVENTIONS  - Monitor WBC  - Administer growth factors as ordered  - Implement neutropenic guidelines  Outcome: Progressing     Problem: SAFETY ADULT - FALL  Goal: Free from fall injury  Description: INTERVENTIONS:  - Assess pt frequently for physical needs  - Identify cognitive and physical deficits and behaviors that affect risk of falls.   - [place] fall precautions as indicated by assessment.  - Educate pt/family on patient safety including physical limitations  - Instruct pt to call for assistance with activity based on assessment  - Modify environment to reduce risk of injury  - Provide assistive devices as appropriate  - Consider OT/PT consult to assist with strengthening/mobility  - Encourage toileting schedule  Outcome: Progressing     Problem: DISCHARGE PLANNING  Goal: Discharge to home or other facility with appropriate resources  Description: INTERVENTIONS:  - Identify barriers to discharge w/pt and caregiver  - Include patient/family/discharge partner in discharge planning  - Arrange for needed discharge resources and transportation as appropriate  - Identify discharge learning needs (meds, wound care, etc)  - Arrange for interpreters to assist at discharge as needed  - Consider post-discharge preferences of patient/family/discharge partner  - Complete POLST form as appropriate  - Assess patient's ability to be responsible for managing their own health  - Refer to Case Management Department for coordinating discharge planning if the patient needs post-hospital services based on physician/LIP order or complex needs related to functional status, cognitive ability or social support system  Outcome: Progressing

## 2022-05-11 NOTE — PHYSICAL THERAPY NOTE
PHYSICAL THERAPY HIP TREATMENT NOTE - INPATIENT    Room Number: 426/426-A            Presenting Problem: R NIKI       Problem List  Principal Problem:    Primary osteoarthritis of right hip  Active Problems:    Essential hypertension      PHYSICAL THERAPY ASSESSMENT     Pt seem daily. Chart reviewed. RN approved pt participation in PT RX. PTA wore mask,gloves and goggle. Min a for bed mobility and transfer. EOB sitting balance activity with emphasis on core stabilization. This PM amb distance incc to 2 x 50 ft with RW and min a;provided cuing for gait pattern as well as for postural awareness. Navigated 2 stairs with max a. Ther ex. Pt ended session sitting up in chair;call light within reach. RN aware. The patient's Approx Degree of Impairment: 57.7% has been calculated based on documentation in the [facility] '6 clicks' Inpatient Basic Mobility Short Form. Research supports that patients with this level of impairment may benefit from Sub-acute Rehabilitation. DISCHARGE RECOMMENDATIONS  PT Discharge Recommendations: Sub-acute rehabilitation    PLAN  PT Treatment Plan: Bed mobility; Endurance;Gait training    SUBJECTIVE  Pt reports being ready for PT RX    OBJECTIVE  Precautions: Bed/chair alarm;Limb alert - right    WEIGHT BEARING RESTRICTION        R Lower Extremity: Weight Bearing as Tolerated       PAIN ASSESSMENT   Ratin          BALANCE  Static Sitting: Fair  Dynamic Sitting: Poor +  Static Standing: Fair -  Dynamic Standing: Poor +  ACTIVITY TOLERANCE           BP: 117/54  BP Location: Right arm  BP Method: Automatic  Patient Position: Lying    O2 WALK       AM-PAC '6-Clicks' INPATIENT SHORT FORM - BASIC MOBILITY  How much difficulty does the patient currently have. ..   Patient Difficulty: Turning over in bed (including adjusting bedclothes, sheets and blankets)?: A Little   Patient Difficulty: Sitting down on and standing up from a chair with arms (e.g., wheelchair, bedside commode, etc.): A Little   Patient Difficulty: Moving from lying on back to sitting on the side of the bed?: A Little   How much help from another person does the patient currently need. .. Help from Another: Moving to and from a bed to a chair (including a wheelchair)?: A Lot   Help from Another: Need to walk in hospital room?: A Lot   Help from Another: Climbing 3-5 steps with a railing?: A Lot     AM-PAC Score:  Raw Score: 15   Approx Degree of Impairment: 57.7%   Standardized Score (AM-PAC Scale): 39.45   CMS Modifier (G-Code): CK    FUNCTIONAL ABILITY STATUS  Functional Mobility/Gait Assessment  Gait Assistance: Minimum assistance  Distance (ft): 2 x 50  Assistive Device: Rolling walker  Pattern: R Decreased stance time  Stairs: Stairs  How Many Stairs: 2  Device: 2 Rails  Assist: Maximum assist  Pattern: Ascend and Descend    Additional Information:     Exercises AM Session PM Session   Ankle Pumps     20 reps 20 reps   Quad Sets 20 reps 20 reps   Glut Sets 20 reps 20 reps     Patient End of Session: Up in chair;Call light within reach;RN aware of session/findings;Bracing education provided per handout; All patient questions and concerns addressed    CURRENT GOALS     Patient Goal Patient's self-stated goal is: to go home tomorrow & get back to work ASAP   Goal #1 Patient is able to demonstrate supine - sit EOB @ level: modified independent   Goal #1   Current Status Min a   Goal #2 Patient is able to demonstrate transfers Sit to/from Stand at assistance level: modified independent     Goal #2  Current Status Min a   Goal #3 Patient is able to ambulate 300 feet with assistive device at assistance level: modified independent    Goal #3   Current Status AM- 2 x 20 ft with RW and min a      PM-2 x 50 ft with RW and min a   Goal #4 Patient will negotiate 12 stairs/one curb w/ assistive device and supervision   Goal #4   Current Status PM-navigated 2 steps with max a   Goal #5 Patient verbalizes and/or demonstrates all precautions and safety concerns independently   Goal #5   Current Status IN progress   Goal #6 Patient independently performs home exercise program for ROM/strengthening per the instructions provided in preparation for discharge. Goal #6  Current Status IN progress.

## 2022-05-12 VITALS
OXYGEN SATURATION: 97 % | WEIGHT: 151.88 LBS | BODY MASS INDEX: 25.93 KG/M2 | SYSTOLIC BLOOD PRESSURE: 109 MMHG | HEIGHT: 64 IN | HEART RATE: 92 BPM | DIASTOLIC BLOOD PRESSURE: 67 MMHG | TEMPERATURE: 98 F | RESPIRATION RATE: 18 BRPM

## 2022-05-12 PROCEDURE — 99239 HOSP IP/OBS DSCHRG MGMT >30: CPT | Performed by: HOSPITALIST

## 2022-05-12 RX ORDER — PSEUDOEPHEDRINE HCL 30 MG
100 TABLET ORAL 2 TIMES DAILY
Qty: 20 CAPSULE | Refills: 0 | Status: SHIPPED | OUTPATIENT
Start: 2022-05-12

## 2022-05-12 RX ORDER — HYDROCODONE BITARTRATE AND ACETAMINOPHEN 5; 325 MG/1; MG/1
0.5 TABLET ORAL EVERY 4 HOURS PRN
Qty: 40 TABLET | Refills: 0 | Status: SHIPPED | OUTPATIENT
Start: 2022-05-12

## 2022-05-12 RX ORDER — MELATONIN
325
Qty: 20 TABLET | Refills: 0 | Status: SHIPPED | OUTPATIENT
Start: 2022-05-12

## 2022-05-12 NOTE — PLAN OF CARE
Pt is A&O x4. Surgical dressing clean/dry/intact. CMS intact. Up with standby assist and walker. WBAT. Given Norco prn for pain. Prevena wound vac in place. Voiding freely. Call light within reach. Safety precaution in place. PT recommended Sub-acute rehab. Pt wants to return home with her niece's 24hrs supervision. Problem: Patient Centered Care  Goal: Patient preferences are identified and integrated in the patient's plan of care  Description: Interventions:  - What would you like us to know as we care for you? I live with my niece.   - Provide timely, complete, and accurate information to patient/family  - Incorporate patient and family knowledge, values, beliefs, and cultural backgrounds into the planning and delivery of care  - Encourage patient/family to participate in care and decision-making at the level they choose  - Honor patient and family perspectives and choices  Outcome: Progressing     Problem: Patient/Family Goals  Goal: Patient/Family Long Term Goal  Description: Patient's Long Term Goal: Return home    Interventions:  -Follows plan of care  - See additional Care Plan goals for specific interventions  Outcome: Progressing  Goal: Patient/Family Short Term Goal  Description: Patient's Short Term Goal: Ambulating without pain    Interventions:   - Pain medication  - PT/OT  - See additional Care Plan goals for specific interventions  Outcome: Progressing     Problem: PAIN - ADULT  Goal: Verbalizes/displays adequate comfort level or patient's stated pain goal  Description: INTERVENTIONS:  - Encourage pt to monitor pain and request assistance  - Assess pain using appropriate pain scale  - Administer analgesics based on type and severity of pain and evaluate response  - Implement non-pharmacological measures as appropriate and evaluate response  - Consider cultural and social influences on pain and pain management  - Manage/alleviate anxiety  - Utilize distraction and/or relaxation techniques  - Monitor for opioid side effects  - Notify MD/LIP if interventions unsuccessful or patient reports new pain  - Anticipate increased pain with activity and pre-medicate as appropriate  Outcome: Progressing     Problem: RISK FOR INFECTION - ADULT  Goal: Absence of fever/infection during anticipated neutropenic period  Description: INTERVENTIONS  - Monitor WBC  - Administer growth factors as ordered  - Implement neutropenic guidelines  Outcome: Progressing     Problem: SAFETY ADULT - FALL  Goal: Free from fall injury  Description: INTERVENTIONS:  - Assess pt frequently for physical needs  - Identify cognitive and physical deficits and behaviors that affect risk of falls.   - [place] fall precautions as indicated by assessment.  - Educate pt/family on patient safety including physical limitations  - Instruct pt to call for assistance with activity based on assessment  - Modify environment to reduce risk of injury  - Provide assistive devices as appropriate  - Consider OT/PT consult to assist with strengthening/mobility  - Encourage toileting schedule  Outcome: Progressing     Problem: DISCHARGE PLANNING  Goal: Discharge to home or other facility with appropriate resources  Description: INTERVENTIONS:  - Identify barriers to discharge w/pt and caregiver  - Include patient/family/discharge partner in discharge planning  - Arrange for needed discharge resources and transportation as appropriate  - Identify discharge learning needs (meds, wound care, etc)  - Arrange for interpreters to assist at discharge as needed  - Consider post-discharge preferences of patient/family/discharge partner  - Complete POLST form as appropriate  - Assess patient's ability to be responsible for managing their own health  - Refer to Case Management Department for coordinating discharge planning if the patient needs post-hospital services based on physician/LIP order or complex needs related to functional status, cognitive ability or social support system  Outcome: Progressing

## 2022-05-12 NOTE — PROGRESS NOTES
[facility] Pharmacy Note: Route Optimization for Famotidine (PEPCID)    Patient is currently on Famotidine (PEPCID) 20 mg IV every 12 hours. The patient meets the criteria to convert to the oral equivalent as established by the IV to Oral conversion protocol approved by the P&T committee. Medication was changed from IV formulation to Famotidine (PEPCID) 20 mg PO every 12 hours per protocol.       Scottie Webb PharmD  5/12/2022,  9:22 AM

## 2022-05-12 NOTE — PHYSICAL THERAPY NOTE
PHYSICAL THERAPY TREATMENT NOTE - INPATIENT     Room Number: 426/426-A       Presenting Problem: R NIKI    Problem List  Principal Problem:    Primary osteoarthritis of right hip  Active Problems:    Essential hypertension      PHYSICAL THERAPY ASSESSMENT   Chart reviewed. RN Patricia Barone approved participation in physical therapy. PPE worn by therapist: mask and gloves. Patient was wearing a mask during session. Patient presented in bed with 4/10 pain. Patient with good  progress towards goals during this session. Education provided on Total Hip precautions, Weight bearing status, Physical therapy plan of care and physiological benefits of out of bed mobility. Patient with good carryover. Instructed on hip exer in all available planes with with cues for correct form. Improved tolerance of all session activity noted today. Patient able to inc amb distance and navigate stairs with cues for gait pattern and safety. educated on proper body mechanics follow rt leg weakness and inc pain with some movements. Bed mobility: Min assist  Transfers: Min assist  Gait Assistance: Contact guard assist  Distance (ft): 250  Assistive Device: Rolling walker  Pattern: R Decreased stance time        Patient stop and used toilet during  Her mobility with Min A Patient was left in bedside chair at end of session with all needs in reach. The patient's Approx Degree of Impairment: 46.58% has been calculated based on documentation in the [facility] '6 clicks' Inpatient Basic Mobility Short Form. Research supports that patients with this level of impairment may benefit from Home with home health PT. Patient leaves with daughter who works from home. She needs RW to take home at discharge time. RN aware of patient status post session. DISCHARGE RECOMMENDATIONS  PT Discharge Recommendations: Home with home health PT     PLAN  PT Treatment Plan: Endurance; Body mechanics;Balance training;Transfer training;Gait training;Energy conservation;Patient education    SUBJECTIVE  I feel better. OBJECTIVE  Precautions: Bed/chair alarm;Limb alert - right    WEIGHT BEARING RESTRICTION  Weight Bearing Restriction: R lower extremity        R Lower Extremity: Weight Bearing as Tolerated       PAIN ASSESSMENT   Ratin  Location: rt leg  Management Techniques: Activity promotion;Repositioning; Body mechanics    BALANCE                                                                                                                       Static Sitting: Good  Dynamic Sitting: Good           Static Standing: Fair +  Dynamic Standing: Fair    ACTIVITY TOLERANCE                         O2 WALK       AM-PAC '6-Clicks' INPATIENT SHORT FORM - BASIC MOBILITY  How much difficulty does the patient currently have. .. Patient Difficulty: Turning over in bed (including adjusting bedclothes, sheets and blankets)?: A Little   Patient Difficulty: Sitting down on and standing up from a chair with arms (e.g., wheelchair, bedside commode, etc.): A Little   Patient Difficulty: Moving from lying on back to sitting on the side of the bed?: A Little   How much help from another person does the patient currently need. .. Help from Another: Moving to and from a bed to a chair (including a wheelchair)?: A Little   Help from Another: Need to walk in hospital room?: A Little   Help from Another: Climbing 3-5 steps with a railing?: A Little     AM-PAC Score:  Raw Score: 18   Approx Degree of Impairment: 46.58%   Standardized Score (AM-PAC Scale): 43.63   CMS Modifier (G-Code): CK      Additional information:     THERAPEUTIC EXERCISES  Lower Extremity Alternating marching  Ankle pumps  Heel slides  Knee extension  Leg slides  Quad sets     Position Sitting and Supine       Patient End of Session: Up in chair; All patient questions and concerns addressed;RN aware of session/findings;Call light within reach; Needs met    CURRENT GOALS   Goals to be met by: 22  Patient Goal Patient's self-stated goal is: to go home tomorrow & get back to work ASAP   Goal #1 Patient is able to demonstrate supine - sit EOB @ level: modified independent   Goal #1   Current Status Min A   Goal #2 Patient is able to demonstrate transfers Sit to/from Stand at assistance level: modified independent     Goal #2  Current Status CGA   Goal #3 Patient is able to ambulate 300 feet with assistive device at assistance level: modified independent    Goal #3   Current Status CGA RW    Goal #4 Patient will negotiate 12 stairs/one curb w/ assistive device and supervision   Goal #4   Current Status Min A   Goal #5 Patient verbalizes and/or demonstrates all precautions and safety concerns independently   Goal #5   Current Status In progress   Goal #6 Patient independently performs home exercise program for ROM/strengthening per the instructions provided in preparation for discharge.    Goal #6  Current Status In progress

## 2022-05-12 NOTE — TELEPHONE ENCOUNTER
Priyanka Gilman,     Please sign off on form:Fmla  -Highlight the patient and hit \"Chart\" button. -In Chart Review, w/in the Encounter tab - click 1 time on the Telephone call encounter for 05/11/22 Scroll down the telephone encounter.  -Click \"scan on\" blue Hyperlink under \"Media\" heading for FMCHARLES Payne 05/12/22  w/in the telephone enc.  -Click on Acknowledge button at the bottom right corner and left-click onto image, signature stamp appears and drag signature to Provider signature line. Stamp will turn blue. Close window.      Thank you,  Jessica Polanco

## 2022-05-12 NOTE — PLAN OF CARE
Problem: PAIN - ADULT  Goal: Verbalizes/displays adequate comfort level or patient's stated pain goal  Description: INTERVENTIONS:  - Encourage pt to monitor pain and request assistance  - Assess pain using appropriate pain scale  - Administer analgesics based on type and severity of pain and evaluate response  - Implement non-pharmacological measures as appropriate and evaluate response  - Consider cultural and social influences on pain and pain management  - Manage/alleviate anxiety  - Utilize distraction and/or relaxation techniques  - Monitor for opioid side effects  - Notify MD/LIP if interventions unsuccessful or patient reports new pain  - Anticipate increased pain with activity and pre-medicate as appropriate  Outcome: Adequate for Discharge     Problem: RISK FOR INFECTION - ADULT  Goal: Absence of fever/infection during anticipated neutropenic period  Description: INTERVENTIONS  - Monitor WBC  - Administer growth factors as ordered  - Implement neutropenic guidelines  Outcome: Adequate for Discharge     Problem: SAFETY ADULT - FALL  Goal: Free from fall injury  Description: INTERVENTIONS:  - Assess pt frequently for physical needs  - Identify cognitive and physical deficits and behaviors that affect risk of falls.   - [place] fall precautions as indicated by assessment.  - Educate pt/family on patient safety including physical limitations  - Instruct pt to call for assistance with activity based on assessment  - Modify environment to reduce risk of injury  - Provide assistive devices as appropriate  - Consider OT/PT consult to assist with strengthening/mobility  - Encourage toileting schedule  Outcome: Adequate for Discharge     Problem: DISCHARGE PLANNING  Goal: Discharge to home or other facility with appropriate resources  Description: INTERVENTIONS:  - Identify barriers to discharge w/pt and caregiver  - Include patient/family/discharge partner in discharge planning  - Arrange for needed discharge resources and transportation as appropriate  - Identify discharge learning needs (meds, wound care, etc)  - Arrange for interpreters to assist at discharge as needed  - Consider post-discharge preferences of patient/family/discharge partner  - Complete POLST form as appropriate  - Assess patient's ability to be responsible for managing their own health  - Refer to Case Management Department for coordinating discharge planning if the patient needs post-hospital services based on physician/LIP order or complex needs related to functional status, cognitive ability or social support system  Outcome: Adequate for Discharge   Pt is up with one person assist and walker. Worked with therapy and they clear her plan is to go home with home health. norco 1/2 tab as needed for pain. Waiting for family member to come and pick her up.

## 2022-05-12 NOTE — HOME CARE LIAISON
Received referral via [facility] for Home Health services. Spoke w/ patient and provided with list of Hollie Rader providers from [facility], patient choice is [phone]. Agency reserved in [facility]. Requested contact information to be placed on AVS by CHRIS/Allyson. Financial interest disclosure provided to patient. Notified Kris.     Requested F2[facility] for RN & PT

## 2022-05-12 NOTE — CM/SW NOTE
CHRIS followed up on DC planning. SW received confirmation that [facility] can accept the pt for services. Dorothy from [place] [id number] provided choices and pt and family are agreeable. Pt is doing well enough to DC home and pt will have family who can assist at home as well    PLAN: Residential [facility] pending med clear    DUKE Lund, MSW ext.  [phone]

## 2022-06-08 ENCOUNTER — HOSPITAL ENCOUNTER (OUTPATIENT)
Dept: GENERAL RADIOLOGY | Facility: HOSPITAL | Age: 82
Discharge: HOME | End: 2022-06-08
Attending: ORTHOPAEDIC SURGERY
Payer: MEDICARE

## 2022-06-08 ENCOUNTER — OFFICE VISIT (OUTPATIENT)
Dept: ORTHOPEDICS CLINIC | Facility: CLINIC | Age: 82
End: 2022-06-08
Payer: MEDICARE

## 2022-06-08 DIAGNOSIS — Z47.89 ORTHOPEDIC AFTERCARE: ICD-10-CM

## 2022-06-08 DIAGNOSIS — Z47.89 ORTHOPEDIC AFTERCARE: Primary | ICD-10-CM

## 2022-06-08 PROCEDURE — 73502 X-RAY EXAM HIP UNI 2-3 VIEWS: CPT | Performed by: ORTHOPAEDIC SURGERY

## 2022-06-08 PROCEDURE — 99024 POSTOP FOLLOW-UP VISIT: CPT | Performed by: ORTHOPAEDIC SURGERY

## 2022-06-08 PROCEDURE — 1111F DSCHRG MED/CURRENT MED MERGE: CPT | Performed by: ORTHOPAEDIC SURGERY

## 2022-06-09 ENCOUNTER — TELEPHONE (OUTPATIENT)
Dept: ORTHOPEDICS CLINIC | Facility: CLINIC | Age: 82
End: 2022-06-09

## 2022-06-09 NOTE — TELEPHONE ENCOUNTER
Estefani michele from [facility] states she needs to discharge pt from [facility] please advise   Can do a verbal order

## 2022-06-11 NOTE — TELEPHONE ENCOUNTER
[address] Sherly Santana faxed STD forms to [phone] desk.   Scanned, took orig to Forms

## 2022-06-23 ENCOUNTER — OFFICE VISIT (OUTPATIENT)
Dept: PHYSICAL THERAPY | Age: 82
End: 2022-06-23
Attending: PHYSICIAN ASSISTANT
Payer: MEDICARE

## 2022-06-23 DIAGNOSIS — Z47.89 ORTHOPEDIC AFTERCARE: ICD-10-CM

## 2022-06-23 PROCEDURE — 97161 PT EVAL LOW COMPLEX 20 MIN: CPT

## 2022-06-28 ENCOUNTER — OFFICE VISIT (OUTPATIENT)
Dept: PHYSICAL THERAPY | Age: 82
End: 2022-06-28
Attending: PHYSICIAN ASSISTANT
Payer: MEDICARE

## 2022-06-28 PROCEDURE — 97110 THERAPEUTIC EXERCISES: CPT

## 2022-06-28 NOTE — PROGRESS NOTES
Dx:   s/p R NIKI     Insurance (Authorized # of Visits): 10 Medicare   [id number] visits: 8   Authorizing Physician: Dr. Maldonado Daigle MD visit: 7/11/22  Fall Risk: standard         Precautions:   Heidi Guzman initial: status- 57/100           D/C FOTO: status-          DOS: 5/10/22 anterior approach   Subjective: Pt states her R hip is sore because she did a lot on Sunday. She states it's better today. She reports she has been sleeping without pillow under knee and can tolerate it now. Pain level: 0/10  Objective: see flow sheet below      Assessment: Initiated hip strengthening exercises in standing and provided pt with HEP. Goals:   1. Pt will have improved hip AROM Flex to 110 deg and ABD to 20 deg to be able to don/doff shoes and perform car transfers without difficulty. 2.  Pt will improve hip strength to 4/5 grossly to be able to negotiate stairs reciprocally with min use of handrail. 3.  Pt will perform 5 times sit to stand test in <15 seconds, demonstrating improved functional strength and decreased fall risk. 4.  Pt will be independent and compliant with comprehensive HEP to maintain progress achieved in PT. Plan: continue with hip strengthening exercises  Date: 6/28/2022  TX#: 2/10 Date:                 TX#: 3/ Date:                 TX#: 4/ Date:                 TX#: 5/ Date:    Tx#: 6/   There ex:  nustep L5 x 10 min  Standing hip flex [id number] R/L  Standing hip abd 10x2 R/L  Mini squats 10x2  Sidestepping 25ftx 2 laps  4\"step up fwd/lat 10x2 R/L                            HEP: 6/28/22- standing hip flex, hip abd, mini squats    Charges: TEx3       Total Timed Treatment: 40 min  Total Treatment Time: 40 min

## 2022-07-05 ENCOUNTER — OFFICE VISIT (OUTPATIENT)
Dept: PHYSICAL THERAPY | Age: 82
End: 2022-07-05
Attending: PHYSICIAN ASSISTANT
Payer: MEDICARE

## 2022-07-05 PROCEDURE — 97110 THERAPEUTIC EXERCISES: CPT

## 2022-07-05 NOTE — PROGRESS NOTES
Dx:   s/p R NIKI     Insurance (Authorized # of Visits): 10 Medicare   [address] visits: 8   Authorizing Physician: Dr. Tete Daigle MD visit: 7/11/22  Fall Risk: standard         Precautions:   Matthew Older initial: status- 57/100           D/C FOTO: status-          DOS: 5/10/22 anterior approach   Subjective: Pt states her R hip is feeling a little sore because she did a lot of walking yesterday. However, she states that she has noticed she is able to do more activities like driving with less pain. Pain level: 0/10  Objective: see flow sheet below      Assessment: Progressed hip strengthening exercises and provided pt with yellow TB for HEP. Goals:   1. Pt will have improved hip AROM Flex to 110 deg and ABD to 20 deg to be able to don/doff shoes and perform car transfers without difficulty. 2.  Pt will improve hip strength to 4/5 grossly to be able to negotiate stairs reciprocally with min use of handrail. 3.  Pt will perform 5 times sit to stand test in <15 seconds, demonstrating improved functional strength and decreased fall risk. 4.  Pt will be independent and compliant with comprehensive HEP to maintain progress achieved in PT. Plan: continue with hip strengthening exercises  Date: 6/28/2022  TX#: 2/10 Date: 7/5/22    TX#: 3/10 Date:                 TX#: 4/ Date:                 TX#: 5/ Date:    Tx#: 6/   There ex:  nustep L5 x 10 min  Standing hip flex [id number] R/L  Standing hip abd 10x2 R/L  Mini squats 10x2  Sidestepping 25ftx 2 laps  4\"step up fwd/lat 10x2 R/L There ex:  nustep L5 x 10 min   Shuttle 4B 15x2 DL  Shuttle 2B 15x2 R LE  Standing hip abd/ext yellow TB 10x2 R/L  Hip flex yellow TB 10x2 R/L  6\"step up fwd 10x2 R/L  4\"step up w/hip abd 10x2 R/L  Sit to stand chair no UE 10x2                           HEP: 6/28/22- standing hip flex, hip abd, mini squats          7/5/22- yellow TB for hip abd, hip flex    Charges: TEx3       Total Timed Treatment: 40 min  Total Treatment Time: 40 min

## 2022-07-07 ENCOUNTER — OFFICE VISIT (OUTPATIENT)
Dept: PHYSICAL THERAPY | Age: 82
End: 2022-07-07
Attending: PHYSICIAN ASSISTANT
Payer: MEDICARE

## 2022-07-07 PROCEDURE — 97112 NEUROMUSCULAR REEDUCATION: CPT

## 2022-07-07 PROCEDURE — 97110 THERAPEUTIC EXERCISES: CPT

## 2022-07-07 NOTE — PROGRESS NOTES
Dx:   s/p R NIKI     Insurance (Authorized # of Visits): 10 Medicare   [id number] visits: 8   Authorizing Physician: Dr. Estrella Daigle MD visit: 7/13/22  Fall Risk: standard         Precautions:   Rhonda Carlson initial: status- 57/100           D/C FOTO: status-          DOS: 5/10/22 anterior approach   Subjective: Pt reports she went shopping with her niece yesterday, did a lot of walking. Had no issues while shopping but noticed some soreness when stepping up a step to get into her home after. States the hip is \"a little sore\" today. Rates pain \"less than 1\"/10. Objective: see flow sheet below      Assessment: Continued with progressive LE strengthening with good tolerance. Also initiated proprioceptive training. Patient requires UE support for balance challenges. Goals:   1. Pt will have improved hip AROM Flex to 110 deg and ABD to 20 deg to be able to don/doff shoes and perform car transfers without difficulty. 2.  Pt will improve hip strength to 4/5 grossly to be able to negotiate stairs reciprocally with min use of handrail. 3.  Pt will perform 5 times sit to stand test in <15 seconds, demonstrating improved functional strength and decreased fall risk. 4.  Pt will be independent and compliant with comprehensive HEP to maintain progress achieved in PT. Plan: Continue with hip strengthening exercises. Progress balance re-ed as able  Date: 6/28/2022  TX#: 2/10 Date: 7/5/22    TX#: 3/10 Date: 7/7/22                TX#: 4/10 Date:                 TX#: 5/ Date:    Tx#: 6/   There ex:  nustep L5 x 10 min  Standing hip flex [id number] R/L  Standing hip abd 10x2 R/L  Mini squats 10x2  Sidestepping 25ftx 2 laps  4\"step up fwd/lat 10x2 R/L There ex:  nustep L5 x 10 min   Shuttle 4B 15x2 DL  Shuttle 2B 15x2 R LE  Standing hip abd/ext yellow TB 10x2 R/L  Hip flex yellow TB 10x2 R/L  6\"step up fwd 10x2 R/L  4\"step up w/hip abd 10x2 R/L  Sit to stand chair no UE 10x2 There Ex:  NU step L5 x 10 min  Standing hip abd/ext 2x10 R/L with RTB   Lateral walk at barre with RTB x 4 laps  6\" fwd step up 2x10 R/L with 1 hand rail  4\" fwd step down 2x10 with 1 hand rail  Sit to stand from chair with no UEs 2x10  Shuttle DL press 2x15 with 5 bands  Shuttle SL press 2x15 with 3 bands    NMR:  Tilt board M/L and A/P taps 20x to balance x 30\" ea with light UEs  Air ex march 20x with light UEs                            HEP: 6/28/22- standing hip flex, hip abd, mini squats          7/5/22- yellow TB for hip abd, hip flex    Charges: TE x 2, NMR x 1       Total Timed Treatment: 45 min  Total Treatment Time: 45 min

## 2022-07-13 ENCOUNTER — OFFICE VISIT (OUTPATIENT)
Dept: ORTHOPEDICS CLINIC | Facility: CLINIC | Age: 82
End: 2022-07-13
Payer: MEDICARE

## 2022-07-13 DIAGNOSIS — Z47.89 ORTHOPEDIC AFTERCARE: Primary | ICD-10-CM

## 2022-07-13 PROCEDURE — 99024 POSTOP FOLLOW-UP VISIT: CPT | Performed by: ORTHOPAEDIC SURGERY

## 2022-07-14 ENCOUNTER — OFFICE VISIT (OUTPATIENT)
Dept: PHYSICAL THERAPY | Age: 82
End: 2022-07-14
Attending: PHYSICIAN ASSISTANT
Payer: MEDICARE

## 2022-07-14 PROCEDURE — 97110 THERAPEUTIC EXERCISES: CPT

## 2022-07-14 PROCEDURE — 97112 NEUROMUSCULAR REEDUCATION: CPT

## 2022-07-14 NOTE — PROGRESS NOTES
Dx:   s/p R NIKI     Insurance (Authorized # of Visits): 10 Medicare   [address] visits: 8   Authorizing Physician: Dr. Raquel Mcdowell  Next MD visit: 11/9/22  Fall Risk: standard         Precautions:   Alonzo Pollard initial: status- 57/100           D/C FOTO: status-          DOS: 5/10/22 anterior approach   Subjective: Pt reports she was picking up things in her yard yesterday so hip is a little sore but not bad. Saw surgeon yesterday and does not have to go back for 6 months. Rates pain \"1/2\"/10 currently. Objective: see flow sheet below      Assessment: Increased step height to facilitate return to reciprocal stair negotiation at home. Patient able to navigate steps with minimal compensation and 1 hand rail. .       Goals:   1. Pt will have improved hip AROM Flex to 110 deg and ABD to 20 deg to be able to don/doff shoes and perform car transfers without difficulty. 2.  Pt will improve hip strength to 4/5 grossly to be able to negotiate stairs reciprocally with min use of handrail. 3.  Pt will perform 5 times sit to stand test in <15 seconds, demonstrating improved functional strength and decreased fall risk. 4.  Pt will be independent and compliant with comprehensive HEP to maintain progress achieved in PT. Plan: Continue with hip strengthening exercises. Progress balance re-ed as able  Date: 6/28/2022  TX#: 2/10 Date: 7/5/22    TX#: 3/10 Date: 7/7/22                TX#: 4/10 Date: 7/14/22                 TX#: 5/10 Date:    Tx#: 6/   There ex:  nustep L5 x 10 min  Standing hip flex [id number] R/L  Standing hip abd 10x2 R/L  Mini squats 10x2  Sidestepping 25ftx 2 laps  4\"step up fwd/lat 10x2 R/L There ex:  nustep L5 x 10 min   Shuttle 4B 15x2 DL  Shuttle 2B 15x2 R LE  Standing hip abd/ext yellow TB 10x2 R/L  Hip flex yellow TB 10x2 R/L  6\"step up fwd 10x2 R/L  4\"step up w/hip abd 10x2 R/L  Sit to stand chair no UE 10x2 There Ex:  NU step L5 x 10 min  Standing hip abd/ext 2x10 R/L with RTB   Lateral walk at bar[place] with RTB x 4 laps  6\" fwd step up 2x10 R/L with 1 hand rail  4\" fwd step down 2x10 with 1 hand rail  Sit to stand from chair with no UEs 2x10  Shuttle DL press 2x15 with 5 bands  Shuttle SL press 2x15 with 3 bands    NMR:  Tilt board M/L and A/P taps 20x to balance x 30\" ea with light UEs  Air ex march 20x with light UEs   TherEx:  NU step L5 x 10 min  7\" fwd step up 20x with 1 hand rail  7\" fwd step down 20x with 1 hand rail   Standing hip abd and extension 20x R/L with RTB   Shuttle DL press 2x15 with 5 bands  Shuttle SL press 2x15 with 3 bands    NMR:  2 cone touch 20x R/L with intermittent 1 UE  Tilt board M/L and A/P taps 20x to balance x 30\" ea with light UEs  BOSU fwd alt lunge 15x with 1 hand rail                          HEP: 6/28/22- standing hip flex, hip abd, mini squats          7/5/22- yellow TB for hip abd, hip flex    Charges: TE x 2, NMR x 1       Total Timed Treatment: 45 min  Total Treatment Time: 45 min

## 2022-07-18 ENCOUNTER — LAB ENCOUNTER (OUTPATIENT)
Dept: LAB | Facility: HOSPITAL | Age: 82
End: 2022-07-18
Attending: INTERNAL MEDICINE
Payer: MEDICARE

## 2022-07-18 ENCOUNTER — OFFICE VISIT (OUTPATIENT)
Dept: INTERNAL MEDICINE CLINIC | Facility: CLINIC | Age: 82
End: 2022-07-18
Payer: MEDICARE

## 2022-07-18 VITALS
DIASTOLIC BLOOD PRESSURE: 80 MMHG | RESPIRATION RATE: 14 BRPM | BODY MASS INDEX: 24.92 KG/M2 | WEIGHT: 146 LBS | HEART RATE: 100 BPM | SYSTOLIC BLOOD PRESSURE: 142 MMHG | HEIGHT: 64 IN | OXYGEN SATURATION: 98 %

## 2022-07-18 DIAGNOSIS — Z12.31 ENCOUNTER FOR SCREENING MAMMOGRAM FOR BREAST CANCER: ICD-10-CM

## 2022-07-18 DIAGNOSIS — R32 URINARY INCONTINENCE, UNSPECIFIED TYPE: ICD-10-CM

## 2022-07-18 DIAGNOSIS — I87.2 VENOUS STASIS DERMATITIS OF BOTH LOWER EXTREMITIES: ICD-10-CM

## 2022-07-18 DIAGNOSIS — M85.80 OSTEOPENIA, UNSPECIFIED LOCATION: ICD-10-CM

## 2022-07-18 DIAGNOSIS — I10 ESSENTIAL HYPERTENSION: ICD-10-CM

## 2022-07-18 DIAGNOSIS — Z00.00 ENCOUNTER FOR ANNUAL HEALTH EXAMINATION: Primary | ICD-10-CM

## 2022-07-18 DIAGNOSIS — Z96.641 STATUS POST RIGHT HIP REPLACEMENT: ICD-10-CM

## 2022-07-18 DIAGNOSIS — Z78.0 POST-MENOPAUSAL: ICD-10-CM

## 2022-07-18 LAB
ALBUMIN SERPL-MCNC: 3.9 G/DL (ref 3.4–5)
ALBUMIN/GLOB SERPL: 1.1 {RATIO} (ref 1–2)
ALP LIVER SERPL-CCNC: 123 U/L
ALT SERPL-CCNC: 22 U/L
ANION GAP SERPL CALC-SCNC: 5 MMOL/L (ref 0–18)
AST SERPL-CCNC: 22 U/L (ref 15–37)
BASOPHILS # BLD AUTO: 0.03 X10(3) UL (ref 0–0.2)
BASOPHILS NFR BLD AUTO: 0.5 %
BILIRUB SERPL-MCNC: 0.5 MG/DL (ref 0.1–2)
BUN BLD-MCNC: 23 MG/DL (ref 7–18)
BUN/CREAT SERPL: 24 (ref 10–20)
CALCIUM BLD-MCNC: 9.9 MG/DL (ref 8.5–10.1)
CHLORIDE SERPL-SCNC: 110 MMOL/L (ref 98–112)
CHOLEST SERPL-MCNC: 176 MG/DL (ref ?–200)
CO2 SERPL-SCNC: 28 MMOL/L (ref 21–32)
CREAT BLD-MCNC: 0.96 MG/DL
DEPRECATED RDW RBC AUTO: 48.9 FL (ref 35.1–46.3)
EOSINOPHIL # BLD AUTO: 0.26 X10(3) UL (ref 0–0.7)
EOSINOPHIL NFR BLD AUTO: 4 %
ERYTHROCYTE [DISTWIDTH] IN BLOOD BY AUTOMATED COUNT: 13.2 % (ref 11–15)
FASTING PATIENT LIPID ANSWER: YES
FASTING STATUS PATIENT QL REPORTED: YES
GLOBULIN PLAS-MCNC: 3.5 G/DL (ref 2.8–4.4)
GLUCOSE BLD-MCNC: 94 MG/DL (ref 70–99)
HCT VFR BLD AUTO: 39.4 %
HDLC SERPL-MCNC: 54 MG/DL (ref 40–59)
HGB BLD-MCNC: 12.5 G/DL
IMM GRANULOCYTES # BLD AUTO: 0.01 X10(3) UL (ref 0–1)
IMM GRANULOCYTES NFR BLD: 0.2 %
LDLC SERPL CALC-MCNC: 102 MG/DL (ref ?–100)
LYMPHOCYTES # BLD AUTO: 1.88 X10(3) UL (ref 1–4)
LYMPHOCYTES NFR BLD AUTO: 28.9 %
MCH RBC QN AUTO: 31.6 PG (ref 26–34)
MCHC RBC AUTO-ENTMCNC: 31.7 G/DL (ref 31–37)
MCV RBC AUTO: 99.7 FL
MONOCYTES # BLD AUTO: 0.56 X10(3) UL (ref 0.1–1)
MONOCYTES NFR BLD AUTO: 8.6 %
NEUTROPHILS # BLD AUTO: 3.76 X10 (3) UL (ref 1.5–7.7)
NEUTROPHILS # BLD AUTO: 3.76 X10(3) UL (ref 1.5–7.7)
NEUTROPHILS NFR BLD AUTO: 57.8 %
NONHDLC SERPL-MCNC: 122 MG/DL (ref ?–130)
OSMOLALITY SERPL CALC.SUM OF ELEC: 299 MOSM/KG (ref 275–295)
PLATELET # BLD AUTO: 241 10(3)UL (ref 150–450)
POTASSIUM SERPL-SCNC: 4.4 MMOL/L (ref 3.5–5.1)
PROT SERPL-MCNC: 7.4 G/DL (ref 6.4–8.2)
RBC # BLD AUTO: 3.95 X10(6)UL
SODIUM SERPL-SCNC: 143 MMOL/L (ref 136–145)
TRIGL SERPL-MCNC: 109 MG/DL (ref 30–149)
TSI SER-ACNC: 1.4 MIU/ML (ref 0.36–3.74)
VLDLC SERPL CALC-MCNC: 18 MG/DL (ref 0–30)
WBC # BLD AUTO: 6.5 X10(3) UL (ref 4–11)

## 2022-07-18 PROCEDURE — 36415 COLL VENOUS BLD VENIPUNCTURE: CPT

## 2022-07-18 PROCEDURE — 80061 LIPID PANEL: CPT

## 2022-07-18 PROCEDURE — 85025 COMPLETE CBC W/AUTO DIFF WBC: CPT

## 2022-07-18 PROCEDURE — 80053 COMPREHEN METABOLIC PANEL: CPT

## 2022-07-18 PROCEDURE — 84443 ASSAY THYROID STIM HORMONE: CPT

## 2022-07-18 RX ORDER — BETAMETHASONE DIPROPIONATE 0.5 MG/G
OINTMENT TOPICAL
Qty: 45 G | Refills: 1 | Status: SHIPPED | OUTPATIENT
Start: 2022-07-18

## 2022-07-19 ENCOUNTER — TELEPHONE (OUTPATIENT)
Dept: INTERNAL MEDICINE CLINIC | Facility: CLINIC | Age: 82
End: 2022-07-19

## 2022-07-19 RX ORDER — BETAMETHASONE DIPROPIONATE 0.5 MG/G
1 LOTION TOPICAL 2 TIMES DAILY
Qty: 1 EACH | Refills: 0 | Status: CANCELLED | OUTPATIENT
Start: 2022-07-19

## 2022-07-21 ENCOUNTER — OFFICE VISIT (OUTPATIENT)
Dept: PHYSICAL THERAPY | Age: 82
End: 2022-07-21
Attending: PHYSICIAN ASSISTANT
Payer: MEDICARE

## 2022-07-21 PROCEDURE — 97112 NEUROMUSCULAR REEDUCATION: CPT

## 2022-07-21 PROCEDURE — 97110 THERAPEUTIC EXERCISES: CPT

## 2022-07-21 RX ORDER — TRIAMCINOLONE ACETONIDE 1 MG/G
CREAM TOPICAL 2 TIMES DAILY PRN
Qty: 45 G | Refills: 3 | Status: SHIPPED | OUTPATIENT
Start: 2022-07-21

## 2022-07-21 NOTE — PROGRESS NOTES
Dx:   s/p R NIKI     Insurance (Authorized # of Visits): 10 Medicare   [address] visits: 8   Authorizing Physician: Dr. Bryant Corona  Next MD visit: 11/9/22  Fall Risk: standard         Precautions:   Yamileth Amaral initial: status- 57/100           D/C FOTO: status-          DOS: 5/10/22 anterior approach   Subjective: Pt reports she went up some steps with her right leg on Sunday and did okay. Does still get brief pain with certain movements but it does not last. Denies any pain currently. Objective: see flow sheet below      Assessment: Improved stability allows for decreased need for UEs with balance challenges. Patient would benefit from continued focus on glut med strengthening to optimize gait mechanics and increase hip stability with stair negotiation. Goals:   1. Pt will have improved hip AROM Flex to 110 deg and ABD to 20 deg to be able to don/doff shoes and perform car transfers without difficulty. 2.  Pt will improve hip strength to 4/5 grossly to be able to negotiate stairs reciprocally with min use of handrail. 3.  Pt will perform 5 times sit to stand test in <15 seconds, demonstrating improved functional strength and decreased fall risk. 4.  Pt will be independent and compliant with comprehensive HEP to maintain progress achieved in PT. Plan: Continue with hip strengthening exercises.  Progress balance re-ed as able  Date: 6/28/2022  TX#: 2/10 Date: 7/5/22    TX#: 3/10 Date: 7/7/22                TX#: 4/10 Date: 7/14/22                 TX#: 5/10 Date: 7/21/22  Tx#: 6/10   There ex:  nustep L5 x 10 min  Standing hip flex [id number] R/L  Standing hip abd 10x2 R/L  Mini squats 10x2  Sidestepping 25ftx 2 laps  4\"step up fwd/lat 10x2 R/L There ex:  nustep L5 x 10 min   Shuttle 4B 15x2 DL  Shuttle 2B 15x2 R LE  Standing hip abd/ext yellow TB 10x2 R/L  Hip flex yellow TB 10x2 R/L  6\"step up fwd 10x2 R/L  4\"step up w/hip abd 10x2 R/L  Sit to stand chair no UE 10x2 There Ex:  NU step L5 x 10 min  Standing hip abd/ext 2x10 R/L with RTB   Lateral walk at barre with RTB x 4 laps  6\" fwd step up 2x10 R/L with 1 hand rail  4\" fwd step down 2x10 with 1 hand rail  Sit to stand from chair with no UEs 2x10  Shuttle DL press 2x15 with 5 bands  Shuttle SL press 2x15 with 3 bands    NMR:  Tilt board M/L and A/P taps 20x to balance x 30\" ea with light UEs  Air ex march 20x with light UEs   TherEx:  NU step L5 x 10 min  7\" fwd step up 20x with 1 hand rail  7\" fwd step down 20x with 1 hand rail   Standing hip abd and extension 20x R/L with RTB   Shuttle DL press 2x15 with 5 bands  Shuttle SL press 2x15 with 3 bands    NMR:  2 cone touch 20x R/L with intermittent 1 UE  Tilt board M/L and A/P taps 20x to balance x 30\" ea with light UEs  BOSU fwd alt lunge 15x with 1 hand rail  TherEx:  NU step L5 x 10 min  Standing hip abd/ext/flex 20xea R/L with RTB  8\" fwd step up 15x with 1 hand rail  8\" lat step up 15x with BUEs  8\" fwd step down 15x with 1 hand rail   Hamstring stretch at stair 2x30\"     NMR:  BOSU fwd alt lunge 15x with hand rail  BOSU lateral lunge 15x R/L with hand rail  Air ex march 20x no UEs                        HEP: 6/28/22- standing hip flex, hip abd, mini squats          7/5/22- yellow TB for hip abd, hip flex    Charges: TE x 2, NMR x 1       Total Timed Treatment: 42 min  Total Treatment Time: 42 min

## 2022-07-26 ENCOUNTER — OFFICE VISIT (OUTPATIENT)
Dept: PHYSICAL THERAPY | Age: 82
End: 2022-07-26
Attending: PHYSICIAN ASSISTANT
Payer: MEDICARE

## 2022-07-26 PROCEDURE — 97112 NEUROMUSCULAR REEDUCATION: CPT

## 2022-07-26 PROCEDURE — 97110 THERAPEUTIC EXERCISES: CPT

## 2022-07-26 NOTE — PROGRESS NOTES
Dx:   s/p R NIKI     Insurance (Authorized # of Visits): 10 Medicare   [address] visits: 8   Authorizing Physician: Dr. Dereje Kowalski  Next MD visit: 11/9/22  Fall Risk: standard         Precautions:   Alaina Ontiveros initial: status- 57/100           D/C FOTO: status-          DOS: 5/10/22 anterior approach   Subjective: Pt reports she was able to step up a curb without any difficulty or pain the other, day but often this is difficult, depends on the day. Stood all day at work yesterday and was very sore but better today. Rates pain \"1 or less than a 1\" currently. Objective: see flow sheet below      Assessment: Increased resistance on Shuttle with good tolerance. Glut med weakness continues to contribute to difficulty step ups without UE support. Goals:   1. Pt will have improved hip AROM Flex to 110 deg and ABD to 20 deg to be able to don/doff shoes and perform car transfers without difficulty. 2.  Pt will improve hip strength to 4/5 grossly to be able to negotiate stairs reciprocally with min use of handrail. 3.  Pt will perform 5 times sit to stand test in <15 seconds, demonstrating improved functional strength and decreased fall risk. 4.  Pt will be independent and compliant with comprehensive HEP to maintain progress achieved in PT. Plan: Continue with hip strengthening exercises.  Progress balance re-ed as able  Date: 7/5/22    TX#: 3/10 Date: 7/7/22                TX#: 4/10 Date: 7/14/22                 TX#: 5/10 Date: 7/21/22  Tx#: 6/10 Date: 7/26/22  Tx#: 7/10   There ex:  nustep L5 x 10 min   Shuttle 4B 15x2 DL  Shuttle 2B 15x2 R LE  Standing hip abd/ext yellow TB 10x2 R/L  Hip flex yellow TB 10x2 R/L  6\"step up fwd 10x2 R/L  4\"step up w/hip abd 10x2 R/L  Sit to stand chair no UE 10x2 There Ex:  NU step L5 x 10 min  Standing hip abd/ext 2x10 R/L with RTB   Lateral walk at barre with RTB x 4 laps  6\" fwd step up 2x10 R/L with 1 hand rail  4\" fwd step down 2x10 with 1 hand rail  Sit to stand from chair with no UEs 2x10  Shuttle DL press 2x15 with 5 bands  Shuttle SL press 2x15 with 3 bands    NMR:  Tilt board M/L and A/P taps 20x to balance x 30\" ea with light UEs  Air ex march 20x with light UEs   TherEx:  NU step L5 x 10 min  7\" fwd step up 20x with 1 hand rail  7\" fwd step down 20x with 1 hand rail   Standing hip abd and extension 20x R/L with RTB   Shuttle DL press 2x15 with 5 bands  Shuttle SL press 2x15 with 3 bands    NMR:  2 cone touch 20x R/L with intermittent 1 UE  Tilt board M/L and A/P taps 20x to balance x 30\" ea with light UEs  BOSU fwd alt lunge 15x with 1 hand rail  TherEx:  NU step L5 x 10 min  Standing hip abd/ext/flex 20xea R/L with RTB  8\" fwd step up 15x with 1 hand rail  8\" lat step up 15x with [place]  8\" fwd step down 15x with 1 hand rail   Hamstring stretch at stair 2x30\"     NMR:  BOSU fwd alt lunge 15x with hand rail  BOSU lateral lunge 15x R/L with hand rail  Air ex march 20x no UEs TherEx:  NU step L5 x 10 min  Shuttle DL press 2x15 with 6 bands  Shuttle SL press 2x15 with 3.5 bands  Lateral walk at barre with RTB x 5 laps  Standing hip abd/ext flex 20x ea R/L with RTB  8\" fwd step up 20x with 1 hand rail (unable without UE support)  8\" lat step up 20x with BUEs    NMR:  Air ex march 2x20 no UEs  Cone taps with 2 cones 20x R/L with intermittent 2 finger support  Tilt board A/P and M/L taps 30x to balance x 30\" with 2 finger support                          HEP: 6/28/22- standing hip flex, hip abd, mini squats          7/5/22- yellow TB for hip abd, hip flex    Charges: TE x 2, NMR x 1       Total Timed Treatment: 42 min  Total Treatment Time: 42 min

## 2022-07-28 ENCOUNTER — OFFICE VISIT (OUTPATIENT)
Dept: PHYSICAL THERAPY | Age: 82
End: 2022-07-28
Attending: PHYSICIAN ASSISTANT
Payer: MEDICARE

## 2022-07-28 PROCEDURE — 97110 THERAPEUTIC EXERCISES: CPT

## 2022-07-28 PROCEDURE — 97112 NEUROMUSCULAR REEDUCATION: CPT

## 2022-07-28 NOTE — PROGRESS NOTES
Dx:   s/p R NIKI     Insurance (Authorized # of Visits): 10 Medicare   [address] visits: 8   Authorizing Physician: Dr. Gaby Daigle MD visit: 11/9/22  Fall Risk: standard         Precautions:   Latrice Leon initial: status- 57/100           D/C FOTO: status-          DOS: 5/10/22 anterior approach   Subjective: Pt reports that occasionally she will have an ache when moving leg from break pedal to accelerator. She is able to get in and out of the car now without any difficulty. Objective: see flow sheet below      Assessment: Progressed proprioceptive training for improved postural stability. Goals:   1. Pt will have improved hip AROM Flex to 110 deg and ABD to 20 deg to be able to don/doff shoes and perform car transfers without difficulty. 2.  Pt will improve hip strength to 4/5 grossly to be able to negotiate stairs reciprocally with min use of handrail. 3.  Pt will perform 5 times sit to stand test in <15 seconds, demonstrating improved functional strength and decreased fall risk. 4.  Pt will be independent and compliant with comprehensive HEP to maintain progress achieved in PT. Plan: Continue with hip strengthening exercises.  Progress balance re-ed as able  Date: 7/5/22    TX#: 3/10 Date: 7/7/22                TX#: 4/10 Date: 7/14/22                 TX#: 5/10 Date: 7/21/22  Tx#: 6/10 Date: 7/26/22  Tx#: 7/10 Date: 7/28/22  Tx:8/10   There ex:  nustep L5 x 10 min   Shuttle 4B 15x2 DL  Shuttle 2B 15x2 R LE  Standing hip abd/ext yellow TB 10x2 R/L  Hip flex yellow TB 10x2 R/L  6\"step up fwd 10x2 R/L  4\"step up w/hip abd 10x2 R/L  Sit to stand chair no UE 10x2 There Ex:  NU step L5 x 10 min  Standing hip abd/ext 2x10 R/L with RTB   Lateral walk at barre with RTB x 4 laps  6\" fwd step up 2x10 R/L with 1 hand rail  4\" fwd step down 2x10 with 1 hand rail  Sit to stand from chair with no UEs 2x10  Shuttle DL press 2x15 with 5 bands  Shuttle SL press 2x15 with 3 bands    NMR:  Tilt board M/L and A/P taps 20x to balance x 30\" ea with light UEs  Air ex march 20x with light UEs   TherEx:  NU step L5 x 10 min  7\" fwd step up 20x with 1 hand rail  7\" fwd step down 20x with 1 hand rail   Standing hip abd and extension 20x R/L with RTB   Shuttle DL press 2x15 with 5 bands  Shuttle SL press 2x15 with 3 bands    NMR:  2 cone touch 20x R/L with intermittent 1 UE  Tilt board M/L and A/P taps 20x to balance x 30\" ea with light UEs  BOSU fwd alt lunge 15x with 1 hand rail  TherEx:  NU step L5 x 10 min  Standing hip abd/ext/flex 20xea R/L with RTB  8\" fwd step up 15x with 1 hand rail  8\" lat step up 15x with Yvetta Habermann  8\" fwd step down 15x with 1 hand rail   Hamstring stretch at stair 2x30\"     NMR:  BOSU fwd alt lunge 15x with hand rail  BOSU lateral lunge 15x R/L with hand rail  Air ex march 20x no UEs TherEx:  NU step L5 x 10 min  Shuttle DL press 2x15 with 6 bands  Shuttle SL press 2x15 with 3.5 bands  Lateral walk at barre with RTB x 5 laps  Standing hip abd/ext flex 20x ea R/L with RTB  8\" fwd step up 20x with 1 hand rail (unable without UE support)  8\" lat step up 20x with BUEs    NMR:  Air ex march 2x20 no UEs  Cone taps with 2 cones 20x R/L with intermittent 2 finger support  Tilt board A/P and M/L taps 30x to balance x 30\" with 2 finger support   There ex:  nustep L5 x 10 min  Standing hip abd/ext/flex red TB 20xea R/L  8\"step up fwd w/hip drive 60X R/L  8\"step up lat w/hip abd 15x R/L    NMRE:  airex fwd/bwd step taps 15x R/L- 1 hand support   airex lat step over 15x- 1 hand support   High knees walking at bar 10x                             HEP: 6/28/22- standing hip flex, hip abd, mini squats          7/5/22- yellow TB for hip abd, hip flex          7/28/22- high knees walking along counter    Charges: TE x 2, NMR x 1       Total Timed Treatment: 40 min  Total Treatment Time: 40 min

## 2022-08-02 ENCOUNTER — OFFICE VISIT (OUTPATIENT)
Dept: PHYSICAL THERAPY | Age: 82
End: 2022-08-02
Attending: PHYSICIAN ASSISTANT
Payer: MEDICARE

## 2022-08-02 PROCEDURE — 97112 NEUROMUSCULAR REEDUCATION: CPT

## 2022-08-02 PROCEDURE — 97110 THERAPEUTIC EXERCISES: CPT

## 2022-08-02 NOTE — PROGRESS NOTES
Dx:   s/p R NIKI     Insurance (Authorized # of Visits): 10 Medicare   [address] visits: 8   Authorizing Physician: Dr. Milton Moreno  Next MD visit: 11/9/22  Fall Risk: standard         Precautions:   Danford Baumgarten initial: status- 57/100           D/C FOTO: status-          DOS: 5/10/22 anterior approach   Subjective: Pt states that R hip is getting better. She is able to go up and down the stairs reciprocally now without much difficulty. Objective: see flow sheet below      Assessment: Continued with glute med strengthening for further hip stability with stair negotiation. Noted decreased UE support with high knee walking. Goals:   1. Pt will have improved hip AROM Flex to 110 deg and ABD to 20 deg to be able to don/doff shoes and perform car transfers without difficulty. 2.  Pt will improve hip strength to 4/5 grossly to be able to negotiate stairs reciprocally with min use of handrail. 3.  Pt will perform 5 times sit to stand test in <15 seconds, demonstrating improved functional strength and decreased fall risk. 4.  Pt will be independent and compliant with comprehensive HEP to maintain progress achieved in PT. Plan: Continue with hip strengthening exercises.  Progress balance re-ed as able  Date: 8/2/22  TX#: 9/10        There ex:  nustep L5 x 10 min  Sidestepping red TB 20ftx2 laps  Monster walk red TB 20ftx 2 laps  Squats on airex 15x  NMRE:  airex step up w/hip drive 07M R/L- 1 hand support  Airex lat step over 15x- 1 hand support   airex step over/bwd taps 15x R/L- 1 hand support  High knee walking 20 ftx2 laps  Retro walking 20 ftx2 laps  SLS w/hip flex/abd/ext 10x R/L- 1 hand support                        HEP: 6/28/22- standing hip flex, hip abd, mini squats          7/5/22- yellow TB for hip abd, hip flex          7/28/22- high knees walking along counter    Charges: [place] 2, NMREx 1       Total Timed Treatment: 40 min  Total Treatment Time: 40 min

## 2022-08-04 ENCOUNTER — OFFICE VISIT (OUTPATIENT)
Dept: PHYSICAL THERAPY | Age: 82
End: 2022-08-04
Attending: PHYSICIAN ASSISTANT
Payer: MEDICARE

## 2022-08-04 PROCEDURE — 97530 THERAPEUTIC ACTIVITIES: CPT

## 2022-08-04 PROCEDURE — 97110 THERAPEUTIC EXERCISES: CPT

## 2022-08-04 NOTE — PROGRESS NOTES
Brockshamika  Pt has attended 10 visits in Physical Therapy. Dx:   s/p R NIKI     Insurance (Authorized # of Visits): 10 Medicare   [address] visits: 8   Authorizing Physician: Dr. Cathy Daigle MD visit: 11/9/22  Fall Risk: standard         Precautions:   Kole Perez initial: status- 57/100           D/C FOTO: status-  71/100      DOS: 5/10/22 anterior approach   Subjective: Pt states that R hip is doing well. Pt states she is getting in/out of car and standing all day at work without pain. She reports taking 2 tylenol in the morning and at night as preventative measure foreseeing her busy day. Pain level: 0/10    Objective: see flow sheet below  AROM: (* denotes performed with pain)  Hip   Flexion: R 95; L 85  Extension: R NT; L NT  Abduction: R 20; L 20  ER: R 15*; L 30  IR: R NT; L 28   Knee flex: R 125, L 125   Strength/MMT: (* denotes performed with pain)  Hip Knee   Flexion: R NT/5; L 5/5  Extension: R NT/5; L NT/5  Abduction: R 4/5; L 4/5  ER: R NT/5; L 4/5  IR: R NT/5; L 4/5 Flexion: R 5/5; L 5/5  Extension: R 5/5; L 5/5        Functional Mobility:  5x sit<>stand: 14.73 sec- no UE    Assessment: Pt has attended 10 PT sessions s/p R NIKI. Pt presents with improved  R hip ROM, improved hip strength, improved 5 times sit to stand test.  Pt has met 4/4 LTGs. Pt has been instructed in HEP. Discharge pt to HEP. Goals:   1. Pt will have improved hip AROM Flex to 110 deg and ABD to 20 deg to be able to don/doff shoes and perform car transfers without difficulty. - MET. 2.  Pt will improve hip strength to 4/5 grossly to be able to negotiate stairs reciprocally with min use of handrail. -MET  3. Pt will perform 5 times sit to stand test in <15 seconds, demonstrating improved functional strength and decreased fall risk. -MET  4.   Pt will be independent and compliant with comprehensive HEP to maintain progress achieved in PT.- MET    Plan: discharge pt  Date: 8/2/22  TX#: 9/10 Date: 8/4/22  Tx: 10/10 There ex:  nustep L5 x 10 min  Sidestepping red TB 20ftx2 laps  Monster walk red TB 20ftx 2 laps  Squats on airex 15x  NMRE:  airex step up w/hip drive 45H R/L- 1 hand support  Airex lat step over 15x- 1 hand support   airex step over/bwd taps 15x R/L- 1 hand support  High knee walking 20 ftx2 laps  Retro walking 20 ftx2 laps  SLS w/hip flex/abd/ext 10x R/L- 1 hand support There ex:  nustep L5 x 10 min  Re-assessment  Sidestepping red TB 20ftx3 laps  Mini squats 15x                        HEP: 6/28/22- standing hip flex, hip abd, mini squats          7/5/22- yellow TB for hip abd, hip flex          7/28/22- high knees walking along counter          8/4/22- sidestepping red TB, mini squats    Charges: Chayito 2, TAx1     Total Timed Treatment: 40 min  Total Treatment Time: 40 min

## 2022-08-09 ENCOUNTER — APPOINTMENT (OUTPATIENT)
Dept: PHYSICAL THERAPY | Age: 82
End: 2022-08-09
Attending: PHYSICIAN ASSISTANT
Payer: MEDICARE

## 2022-08-11 ENCOUNTER — APPOINTMENT (OUTPATIENT)
Dept: PHYSICAL THERAPY | Age: 82
End: 2022-08-11
Attending: PHYSICIAN ASSISTANT
Payer: MEDICARE

## 2022-08-25 ENCOUNTER — HOSPITAL ENCOUNTER (OUTPATIENT)
Dept: MAMMOGRAPHY | Facility: HOSPITAL | Age: 82
Discharge: HOME | End: 2022-08-25
Attending: INTERNAL MEDICINE
Payer: MEDICARE

## 2022-08-25 ENCOUNTER — HOSPITAL ENCOUNTER (OUTPATIENT)
Dept: BONE DENSITY | Facility: HOSPITAL | Age: 82
Discharge: HOME | End: 2022-08-25
Attending: INTERNAL MEDICINE
Payer: MEDICARE

## 2022-08-25 DIAGNOSIS — M85.80 OSTEOPENIA, UNSPECIFIED LOCATION: ICD-10-CM

## 2022-08-25 DIAGNOSIS — Z12.31 ENCOUNTER FOR SCREENING MAMMOGRAM FOR BREAST CANCER: ICD-10-CM

## 2022-08-25 DIAGNOSIS — Z78.0 POST-MENOPAUSAL: ICD-10-CM

## 2022-08-25 PROCEDURE — 77063 BREAST TOMOSYNTHESIS BI: CPT | Performed by: INTERNAL MEDICINE

## 2022-08-25 PROCEDURE — 77067 SCR MAMMO BI INCL CAD: CPT | Performed by: INTERNAL MEDICINE

## 2022-08-25 PROCEDURE — 77080 DXA BONE DENSITY AXIAL: CPT | Performed by: INTERNAL MEDICINE

## 2022-08-28 ENCOUNTER — IMMUNIZATION (OUTPATIENT)
Dept: LAB | Age: 82
End: 2022-08-28
Attending: EMERGENCY MEDICINE
Payer: MEDICARE

## 2022-08-28 DIAGNOSIS — Z23 NEED FOR VACCINATION: Primary | ICD-10-CM

## 2022-08-28 PROCEDURE — 0054A SARSCOV2 VAC 30MCG TRS SUCR: CPT

## 2022-09-08 ENCOUNTER — TELEPHONE (OUTPATIENT)
Dept: INTERNAL MEDICINE CLINIC | Facility: CLINIC | Age: 82
End: 2022-09-08

## 2022-09-08 NOTE — TELEPHONE ENCOUNTER
Dr. Sagastume Ke - request for prophylactic Antibiotics for dental cleaning   Specifically -- Ceftin (she says it's safe for her)    Pharmacy and allergies verified. Patient called office. Patient's date of birth and full name both confirmed. Dentist will not do any work on her unless she takes a prophylactic antibiotic. Dentist will not prescribe it. Status-post Right Hip surgery in May 2022.

## 2022-09-09 RX ORDER — CEFUROXIME AXETIL 500 MG/1
1000 TABLET ORAL ONCE
Qty: 2 TABLET | Refills: 0 | Status: SHIPPED | OUTPATIENT
Start: 2022-09-09 | End: 2022-09-09

## 2022-10-10 ENCOUNTER — OFFICE VISIT (OUTPATIENT)
Facility: CLINIC | Age: 82
End: 2022-10-10
Payer: MEDICARE

## 2022-10-10 VITALS
RESPIRATION RATE: 18 BRPM | SYSTOLIC BLOOD PRESSURE: 120 MMHG | HEIGHT: 64 IN | WEIGHT: 146 LBS | BODY MASS INDEX: 24.92 KG/M2 | DIASTOLIC BLOOD PRESSURE: 88 MMHG | TEMPERATURE: 98 F | HEART RATE: 102 BPM

## 2022-10-10 DIAGNOSIS — Z96.641 STATUS POST RIGHT HIP REPLACEMENT: ICD-10-CM

## 2022-10-10 DIAGNOSIS — R32 URINARY INCONTINENCE, UNSPECIFIED TYPE: ICD-10-CM

## 2022-10-10 DIAGNOSIS — M85.80 OSTEOPENIA, UNSPECIFIED LOCATION: ICD-10-CM

## 2022-10-10 DIAGNOSIS — I10 ESSENTIAL HYPERTENSION: Primary | ICD-10-CM

## 2022-10-10 PROCEDURE — 99214 OFFICE O/P EST MOD 30 MIN: CPT | Performed by: INTERNAL MEDICINE

## 2022-10-10 PROCEDURE — 1126F AMNT PAIN NOTED NONE PRSNT: CPT | Performed by: INTERNAL MEDICINE

## 2022-10-10 RX ORDER — ASPIRIN 325 MG
TABLET ORAL
COMMUNITY
Start: 2022-05-12 | End: 2022-10-10 | Stop reason: ALTCHOICE

## 2022-10-10 RX ORDER — FERROUS SULFATE 325(65) MG
TABLET ORAL
COMMUNITY
Start: 2022-05-12 | End: 2022-10-10 | Stop reason: ALTCHOICE

## 2022-10-10 RX ORDER — CEFUROXIME AXETIL 500 MG/1
TABLET ORAL
COMMUNITY
Start: 2022-09-09 | End: 2022-10-10 | Stop reason: ALTCHOICE

## 2022-10-10 RX ORDER — DOCUSATE SODIUM 100 MG/1
CAPSULE, LIQUID FILLED ORAL
COMMUNITY
Start: 2022-05-12 | End: 2022-10-10 | Stop reason: ALTCHOICE

## 2022-10-10 RX ORDER — HYDROCODONE BITARTRATE AND ACETAMINOPHEN 5; 325 MG/1; MG/1
TABLET ORAL
COMMUNITY
Start: 2022-05-12 | End: 2022-10-10 | Stop reason: ALTCHOICE

## 2022-10-10 RX ORDER — TRIAMCINOLONE ACETONIDE 1 MG/G
CREAM TOPICAL
COMMUNITY
Start: 2022-05-12 | End: 2022-10-10

## 2022-10-10 RX ORDER — MELOXICAM 7.5 MG/1
TABLET ORAL
COMMUNITY
Start: 2022-05-12 | End: 2022-10-10

## 2022-10-27 NOTE — PATIENT INSTRUCTIONS
Calcium- [id number] mg a day  Vitamin D- 800 International units a day    Bring blood pressure cuff to the next appointment
No

## 2022-11-09 ENCOUNTER — OFFICE VISIT (OUTPATIENT)
Dept: ORTHOPEDICS CLINIC | Facility: CLINIC | Age: 82
End: 2022-11-09
Payer: MEDICARE

## 2022-11-09 ENCOUNTER — HOSPITAL ENCOUNTER (OUTPATIENT)
Dept: GENERAL RADIOLOGY | Facility: HOSPITAL | Age: 82
Discharge: HOME | End: 2022-11-09
Attending: ORTHOPAEDIC SURGERY
Payer: MEDICARE

## 2022-11-09 DIAGNOSIS — Z96.641 HISTORY OF TOTAL HIP ARTHROPLASTY, RIGHT: ICD-10-CM

## 2022-11-09 DIAGNOSIS — Z47.89 ORTHOPEDIC AFTERCARE: ICD-10-CM

## 2022-11-09 DIAGNOSIS — Z47.89 ORTHOPEDIC AFTERCARE: Primary | ICD-10-CM

## 2022-11-09 PROCEDURE — 99213 OFFICE O/P EST LOW 20 MIN: CPT | Performed by: ORTHOPAEDIC SURGERY

## 2022-11-09 PROCEDURE — 73502 X-RAY EXAM HIP UNI 2-3 VIEWS: CPT | Performed by: ORTHOPAEDIC SURGERY

## 2022-11-09 PROCEDURE — 1126F AMNT PAIN NOTED NONE PRSNT: CPT | Performed by: ORTHOPAEDIC SURGERY

## 2023-04-12 ENCOUNTER — OFFICE VISIT (OUTPATIENT)
Dept: INTERNAL MEDICINE CLINIC | Facility: CLINIC | Age: 83
End: 2023-04-12

## 2023-04-12 VITALS
WEIGHT: 145.19 LBS | BODY MASS INDEX: 24.79 KG/M2 | TEMPERATURE: 98 F | RESPIRATION RATE: 18 BRPM | SYSTOLIC BLOOD PRESSURE: 128 MMHG | HEIGHT: 64 IN | HEART RATE: 80 BPM | DIASTOLIC BLOOD PRESSURE: 78 MMHG

## 2023-04-12 DIAGNOSIS — Z96.641 STATUS POST RIGHT HIP REPLACEMENT: ICD-10-CM

## 2023-04-12 DIAGNOSIS — I87.2 VENOUS STASIS DERMATITIS OF BOTH LOWER EXTREMITIES: ICD-10-CM

## 2023-04-12 DIAGNOSIS — I10 ESSENTIAL HYPERTENSION: Primary | ICD-10-CM

## 2023-04-12 DIAGNOSIS — R32 URINARY INCONTINENCE, UNSPECIFIED TYPE: ICD-10-CM

## 2023-04-12 PROCEDURE — 1126F AMNT PAIN NOTED NONE PRSNT: CPT | Performed by: INTERNAL MEDICINE

## 2023-04-12 PROCEDURE — 99214 OFFICE O/P EST MOD 30 MIN: CPT | Performed by: INTERNAL MEDICINE

## 2023-04-12 RX ORDER — ASPIRIN 325 MG
1 TABLET ORAL 2 TIMES DAILY
COMMUNITY
Start: 2022-05-12 | End: 2023-04-12 | Stop reason: ALTCHOICE

## 2023-04-12 RX ORDER — FERROUS SULFATE 325(65) MG
1 TABLET ORAL DAILY
COMMUNITY
Start: 2022-05-12 | End: 2023-04-12 | Stop reason: ALTCHOICE

## 2023-05-08 ENCOUNTER — OFFICE VISIT (OUTPATIENT)
Dept: ORTHOPEDICS CLINIC | Facility: CLINIC | Age: 83
End: 2023-05-08

## 2023-05-08 ENCOUNTER — HOSPITAL ENCOUNTER (OUTPATIENT)
Dept: GENERAL RADIOLOGY | Facility: HOSPITAL | Age: 83
Discharge: HOME | End: 2023-05-08
Attending: ORTHOPAEDIC SURGERY
Payer: MEDICARE

## 2023-05-08 DIAGNOSIS — Z47.89 ORTHOPEDIC AFTERCARE: ICD-10-CM

## 2023-05-08 DIAGNOSIS — Z47.89 ORTHOPEDIC AFTERCARE: Primary | ICD-10-CM

## 2023-05-08 PROCEDURE — 73502 X-RAY EXAM HIP UNI 2-3 VIEWS: CPT | Performed by: ORTHOPAEDIC SURGERY

## 2023-05-17 ENCOUNTER — OFFICE VISIT (OUTPATIENT)
Dept: OPHTHALMOLOGY | Facility: CLINIC | Age: 83
End: 2023-05-17

## 2023-05-17 DIAGNOSIS — H43.393 VITREOUS FLOATERS OF BOTH EYES: ICD-10-CM

## 2023-05-17 DIAGNOSIS — H25.13 AGE-RELATED NUCLEAR CATARACT OF BOTH EYES: Primary | ICD-10-CM

## 2023-05-17 PROCEDURE — 92015 DETERMINE REFRACTIVE STATE: CPT | Performed by: OPHTHALMOLOGY

## 2023-05-17 PROCEDURE — 92014 COMPRE OPH EXAM EST PT 1/>: CPT | Performed by: OPHTHALMOLOGY

## 2023-05-17 NOTE — ASSESSMENT & PLAN NOTE
Discussed with patient that cataract in both eyes are advanced enough at this time to consider surgery; it would be patient's choice. Discussed options such as surgery or change of glasses RX. Discussed surgical risks, benefits, alternatives and recovery. Patient understands that changing glasses will not significantly improve vision and elects to have surgery. As Dr. Cory Dinero is no longer performing cataract surgery, a referral to [facility] was offered. Patient agrees to be referred, so we will send complete exam and information to them and the patient will be contacted. Information on [address] [place] [address] ophthalmology given and reviewed with the patient.

## 2023-05-17 NOTE — PATIENT INSTRUCTIONS
Age-related nuclear cataract of both eyes  Discussed with patient that cataract in both eyes are advanced enough at this time to consider surgery; it would be patient's choice. Discussed options such as surgery or change of glasses RX. Discussed surgical risks, benefits, alternatives and recovery. Patient understands that changing glasses will not significantly improve vision and elects to have surgery. As Dr. Alf Tripathi is no longer performing cataract surgery, a referral to [facility] was offered. Patient agrees to be referred, so we will send complete exam and information to them and the patient will be contacted. Information on [address] [place] [address] ophthalmology given and reviewed with the patient. Vitreous floaters of both eyes   There is no evidence of retinal pathology. All signs and symptoms of retinal detachment/tears explained in detail. Patient instructed to call the office if they experience increase in floaters, increase in flashes of light, loss of vision or curtain or veil effect.

## 2023-08-02 ENCOUNTER — LAB ENCOUNTER (OUTPATIENT)
Dept: LAB | Age: 83
End: 2023-08-02
Attending: INTERNAL MEDICINE
Payer: MEDICARE

## 2023-08-02 ENCOUNTER — OFFICE VISIT (OUTPATIENT)
Dept: INTERNAL MEDICINE CLINIC | Facility: CLINIC | Age: 83
End: 2023-08-02

## 2023-08-02 VITALS
BODY MASS INDEX: 25.38 KG/M2 | DIASTOLIC BLOOD PRESSURE: 80 MMHG | RESPIRATION RATE: 20 BRPM | WEIGHT: 148.63 LBS | HEART RATE: 94 BPM | TEMPERATURE: 98 F | SYSTOLIC BLOOD PRESSURE: 138 MMHG | HEIGHT: 64 IN

## 2023-08-02 DIAGNOSIS — Z00.00 ENCOUNTER FOR ANNUAL HEALTH EXAMINATION: Primary | ICD-10-CM

## 2023-08-02 DIAGNOSIS — I10 ESSENTIAL HYPERTENSION: ICD-10-CM

## 2023-08-02 DIAGNOSIS — I87.2 VENOUS STASIS DERMATITIS OF BOTH LOWER EXTREMITIES: ICD-10-CM

## 2023-08-02 DIAGNOSIS — Z12.31 ENCOUNTER FOR SCREENING MAMMOGRAM FOR BREAST CANCER: ICD-10-CM

## 2023-08-02 DIAGNOSIS — R32 URINARY INCONTINENCE, UNSPECIFIED TYPE: ICD-10-CM

## 2023-08-02 DIAGNOSIS — Z96.641 HISTORY OF TOTAL RIGHT HIP REPLACEMENT: ICD-10-CM

## 2023-08-02 DIAGNOSIS — M85.80 OSTEOPENIA, UNSPECIFIED LOCATION: ICD-10-CM

## 2023-08-02 LAB
ALBUMIN SERPL-MCNC: 3.8 G/DL (ref 3.4–5)
ALBUMIN/GLOB SERPL: 1.2 {RATIO} (ref 1–2)
ALP LIVER SERPL-CCNC: 99 U/L
ALT SERPL-CCNC: 28 U/L
ANION GAP SERPL CALC-SCNC: 8 MMOL/L (ref 0–18)
AST SERPL-CCNC: 22 U/L (ref 15–37)
BASOPHILS # BLD AUTO: 0.03 X10(3) UL (ref 0–0.2)
BASOPHILS NFR BLD AUTO: 0.4 %
BILIRUB SERPL-MCNC: 0.6 MG/DL (ref 0.1–2)
BUN BLD-MCNC: 25 MG/DL (ref 7–18)
BUN/CREAT SERPL: 22.7 (ref 10–20)
CALCIUM BLD-MCNC: 10 MG/DL (ref 8.5–10.1)
CHLORIDE SERPL-SCNC: 110 MMOL/L (ref 98–112)
CHOLEST SERPL-MCNC: 181 MG/DL (ref ?–200)
CO2 SERPL-SCNC: 26 MMOL/L (ref 21–32)
CREAT BLD-MCNC: 1.1 MG/DL
DEPRECATED RDW RBC AUTO: 48.3 FL (ref 35.1–46.3)
EGFRCR SERPLBLD CKD-EPI 2021: 50 ML/MIN/1.73M2 (ref 60–?)
EOSINOPHIL # BLD AUTO: 0.39 X10(3) UL (ref 0–0.7)
EOSINOPHIL NFR BLD AUTO: 5.4 %
ERYTHROCYTE [DISTWIDTH] IN BLOOD BY AUTOMATED COUNT: 13.4 % (ref 11–15)
FASTING PATIENT LIPID ANSWER: YES
FASTING STATUS PATIENT QL REPORTED: YES
GLOBULIN PLAS-MCNC: 3.3 G/DL (ref 2.8–4.4)
GLUCOSE BLD-MCNC: 90 MG/DL (ref 70–99)
HCT VFR BLD AUTO: 39.2 %
HDLC SERPL-MCNC: 55 MG/DL (ref 40–59)
HGB BLD-MCNC: 12.8 G/DL
IMM GRANULOCYTES # BLD AUTO: 0.02 X10(3) UL (ref 0–1)
IMM GRANULOCYTES NFR BLD: 0.3 %
LDLC SERPL CALC-MCNC: 109 MG/DL (ref ?–100)
LYMPHOCYTES # BLD AUTO: 1.91 X10(3) UL (ref 1–4)
LYMPHOCYTES NFR BLD AUTO: 26.6 %
MCH RBC QN AUTO: 32.2 PG (ref 26–34)
MCHC RBC AUTO-ENTMCNC: 32.7 G/DL (ref 31–37)
MCV RBC AUTO: 98.7 FL
MONOCYTES # BLD AUTO: 0.59 X10(3) UL (ref 0.1–1)
MONOCYTES NFR BLD AUTO: 8.2 %
NEUTROPHILS # BLD AUTO: 4.23 X10 (3) UL (ref 1.5–7.7)
NEUTROPHILS # BLD AUTO: 4.23 X10(3) UL (ref 1.5–7.7)
NEUTROPHILS NFR BLD AUTO: 59.1 %
NONHDLC SERPL-MCNC: 126 MG/DL (ref ?–130)
OSMOLALITY SERPL CALC.SUM OF ELEC: 302 MOSM/KG (ref 275–295)
PLATELET # BLD AUTO: 229 10(3)UL (ref 150–450)
POTASSIUM SERPL-SCNC: 4.7 MMOL/L (ref 3.5–5.1)
PROT SERPL-MCNC: 7.1 G/DL (ref 6.4–8.2)
RBC # BLD AUTO: 3.97 X10(6)UL
SODIUM SERPL-SCNC: 144 MMOL/L (ref 136–145)
TRIGL SERPL-MCNC: 91 MG/DL (ref 30–149)
TSI SER-ACNC: 1.47 MIU/ML (ref 0.36–3.74)
VIT B12 SERPL-MCNC: 624 PG/ML (ref 193–986)
VLDLC SERPL CALC-MCNC: 15 MG/DL (ref 0–30)
WBC # BLD AUTO: 7.2 X10(3) UL (ref 4–11)

## 2023-08-02 PROCEDURE — 84443 ASSAY THYROID STIM HORMONE: CPT

## 2023-08-02 PROCEDURE — 80061 LIPID PANEL: CPT

## 2023-08-02 PROCEDURE — 82607 VITAMIN B-12: CPT

## 2023-08-02 PROCEDURE — 1125F AMNT PAIN NOTED PAIN PRSNT: CPT | Performed by: INTERNAL MEDICINE

## 2023-08-02 PROCEDURE — 36415 COLL VENOUS BLD VENIPUNCTURE: CPT

## 2023-08-02 PROCEDURE — 80053 COMPREHEN METABOLIC PANEL: CPT

## 2023-08-02 PROCEDURE — 99213 OFFICE O/P EST LOW 20 MIN: CPT | Performed by: INTERNAL MEDICINE

## 2023-08-02 PROCEDURE — G0439 PPPS, SUBSEQ VISIT: HCPCS | Performed by: INTERNAL MEDICINE

## 2023-08-02 PROCEDURE — 85025 COMPLETE CBC W/AUTO DIFF WBC: CPT

## 2023-08-03 ENCOUNTER — HOSPITAL ENCOUNTER (OUTPATIENT)
Age: 83
Discharge: HOME | End: 2023-08-03
Payer: MEDICARE

## 2023-08-03 VITALS
TEMPERATURE: 98 F | HEART RATE: 90 BPM | RESPIRATION RATE: 17 BRPM | DIASTOLIC BLOOD PRESSURE: 82 MMHG | SYSTOLIC BLOOD PRESSURE: 148 MMHG | OXYGEN SATURATION: 100 %

## 2023-08-03 DIAGNOSIS — S81.811A LACERATION OF RIGHT LOWER EXTREMITY, INITIAL ENCOUNTER: Primary | ICD-10-CM

## 2023-08-03 PROCEDURE — 99212 OFFICE O/P EST SF 10 MIN: CPT

## 2023-08-03 PROCEDURE — 99213 OFFICE O/P EST LOW 20 MIN: CPT

## 2023-08-03 NOTE — ED INITIAL ASSESSMENT (HPI)
Pt states she slammed car door onto Rt leg and believes the car may have rust. Requesting updated tetanus shot . Abrasion noted on rt leg.

## 2023-08-03 NOTE — DISCHARGE INSTRUCTIONS
Your last tetanus booster was in 2015 and there is no indication for booster today. Clean the wound with soap and water and apply antibiotic ointment once or twice a day. You may apply ice to decrease the swelling. Tylenol as needed for pain. This may take several weeks to heal due to poor circulation.   Follow-up with your primary doctor as needed

## 2023-08-21 ENCOUNTER — HOSPITAL ENCOUNTER (EMERGENCY)
Facility: HOSPITAL | Age: 83
Discharge: HOME | End: 2023-08-21
Attending: STUDENT IN AN ORGANIZED HEALTH CARE EDUCATION/TRAINING PROGRAM
Payer: MEDICARE

## 2023-08-21 VITALS
DIASTOLIC BLOOD PRESSURE: 95 MMHG | HEIGHT: 64 IN | RESPIRATION RATE: 18 BRPM | TEMPERATURE: 98 F | BODY MASS INDEX: 25.27 KG/M2 | WEIGHT: 148 LBS | SYSTOLIC BLOOD PRESSURE: 153 MMHG | OXYGEN SATURATION: 98 % | HEART RATE: 104 BPM
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
## 2023-08-21 PROCEDURE — 99284 EMERGENCY DEPT VISIT MOD MDM: CPT

## 2023-08-21 PROCEDURE — 10160 PNXR ASPIR ABSC HMTMA BULLA: CPT

## 2023-08-21 PROCEDURE — 99283 EMERGENCY DEPT VISIT LOW MDM: CPT

## 2023-08-21 RX ORDER — CEPHALEXIN 500 MG/1
500 CAPSULE ORAL 4 TIMES DAILY
Qty: 20 CAPSULE | Refills: 0 | Status: SHIPPED | OUTPATIENT
Start: 2023-08-21 | End: 2023-08-26

## 2023-08-21 NOTE — ED INITIAL ASSESSMENT (HPI)
C/o blister to posterior RLE since Friday after being in the garden last Tuesday.   - fevers, chills  Endorses itchiness

## 2023-08-24 ENCOUNTER — PATIENT OUTREACH (OUTPATIENT)
Dept: CASE MANAGEMENT | Age: 83
End: 2023-08-24

## 2023-08-24 NOTE — PROGRESS NOTES
1st attempt ED f/up apt request   PCP -decline, pt more concerned about her upcoming cataract surgery  Closing encounter

## 2023-09-20 ENCOUNTER — HOSPITAL ENCOUNTER (OUTPATIENT)
Dept: MAMMOGRAPHY | Facility: HOSPITAL | Age: 83
Discharge: HOME | End: 2023-09-20
Attending: INTERNAL MEDICINE
Payer: MEDICARE

## 2023-09-20 DIAGNOSIS — Z12.31 ENCOUNTER FOR SCREENING MAMMOGRAM FOR BREAST CANCER: ICD-10-CM

## 2023-09-20 PROCEDURE — 77063 BREAST TOMOSYNTHESIS BI: CPT | Performed by: INTERNAL MEDICINE

## 2023-09-20 PROCEDURE — 77067 SCR MAMMO BI INCL CAD: CPT | Performed by: INTERNAL MEDICINE

## 2023-09-27 ENCOUNTER — TELEPHONE (OUTPATIENT)
Dept: OPHTHALMOLOGY | Facility: CLINIC | Age: 83
End: 2023-09-27

## 2023-09-27 NOTE — TELEPHONE ENCOUNTER
Spoke to pt and pt states she had cataract surgery in both eyes with Dr. Osvaldo Kay and was told she needed to follow up with MAYTE in 3 months.    Moved pt' appointment from May to 12/6/23 @ 3:45pm with MAYTE and told her it was going to be a dilated eye exam.

## 2023-09-27 NOTE — TELEPHONE ENCOUNTER
Patient had a cataract surgery by the ophthalmologist  Mari Conley and advise patient to f/u with Dr Nohelia Gutierrez in 3 months   No openings available Please advise patient

## 2023-12-06 ENCOUNTER — OFFICE VISIT (OUTPATIENT)
Dept: OPHTHALMOLOGY | Facility: CLINIC | Age: 83
End: 2023-12-06
Payer: MEDICARE

## 2023-12-06 DIAGNOSIS — Z96.1 PSEUDOPHAKIA OF BOTH EYES: Primary | ICD-10-CM

## 2023-12-06 DIAGNOSIS — H43.393 VITREOUS FLOATERS OF BOTH EYES: ICD-10-CM

## 2023-12-06 PROBLEM — H25.13 AGE-RELATED NUCLEAR CATARACT OF BOTH EYES: Status: RESOLVED | Noted: 2018-05-10 | Resolved: 2023-12-06

## 2023-12-06 PROCEDURE — 92014 COMPRE OPH EXAM EST PT 1/>: CPT | Performed by: OPHTHALMOLOGY

## 2023-12-06 NOTE — PATIENT INSTRUCTIONS
Pseudophakia of both eyes  No treatment    Vitreous floaters of both eyes   There is no evidence of retinal pathology. All signs and symptoms of retinal detachment/tears explained in detail. Patient instructed to call the office if they experience increase in floaters, increase in flashes of light, loss of vision or curtain or veil effect.      Will see patient in 1 year for a complete exam

## 2023-12-06 NOTE — PROGRESS NOTES
Reagan Beltran is a 81year old female. HPI:     HPI    Patient is here for a complete exam.  Patient states her vision is good since having cataract surgery OU. Patient is happy without glasses. POH cataract surgery OD 8/29/23 OS 9/05/23 done by Dr. Gila Cooper.    Last edited by Joselo Leon OT on 12/6/2023  4:31 PM.        Patient History:  Past Medical History:   Diagnosis Date    Anxiety state     Cataract     Fibroids     High blood pressure     Incontinence     bladder    Osteoarthritis     PONV (postoperative nausea and vomiting)     Trigger index finger of right hand 2010    cortisone injection, phys therapy    UTI (urinary tract infection) 2011    medication    Visual impairment     readers       Surgical History: Reagan Beltran has a past surgical history that includes appendectomy (01/01/1994); hysterectomy (01/01/1994) ([facility]); oophorectomy (01/01/1994) ([facility]); hip replacement surgery (Right, 2022); Cataract extraction w/  intraocular lens implant (Right, 08/29/2023) (Dr. Gila Cooper @ [address] [place] [address]); and Cataract extraction, extracapsular w/ intraocular lens implant (Left, 09/05/2023) (done by Dr. Gila Cooper).     Family History   Problem Relation Age of Onset    Diabetes Father     Heart Disorder Father     Breast Cancer Paternal Cousin Female 52    Heart Disorder Mother     Hypertension Mother     Glaucoma Neg     Ovarian Cancer Neg     Macular degeneration Neg        Social History:   Social History     Socioeconomic History    Marital status: Single   Tobacco Use    Smoking status: Never    Smokeless tobacco: Never   Vaping Use    Vaping Use: Never used   Substance and Sexual Activity    Alcohol use: No    Drug use: No    Sexual activity: Not Currently     Partners: Male   Other Topics Concern    Caffeine Concern Yes     Comment: coffee, 2 cups/day    Right Handed Yes       Medications:  Current Outpatient Medications   Medication Sig Dispense Refill    triamcinolone 0.1 % External Cream Apply topically 2 (two) times daily as needed. 45 g 3    Betamethasone Dipropionate Aug 0.05 % External Ointment APPLY TO AFFECTED AREA TWICE A DAY IF NEEDED 45 g 1    Calcium Carbonate Antacid 600 MG Oral Chew Tab Chew 600 mg by mouth 2 (two) times daily. Cholecalciferol (VITAMIN D3) 400 units Oral Tab Take by mouth.      loratadine 10 MG Oral Tab Take 1 tablet (10 mg total) by mouth daily as needed for Allergies. Allergies:   Allergies   Allergen Reactions    Sulfamethoxazole SWELLING     Other reaction(s): lip and tongue swelling    Trimethoprim SWELLING     Other reaction(s): lip and tongue swelling    Erythromycin OTHER (SEE COMMENTS)     Other reaction(s): Shakiness    Erythromycin Base [Erythromycin Stearate] OTHER (SEE COMMENTS)    Other OTHER (SEE COMMENTS)    Ciprofloxacin RASH       ROS:       PHYSICAL EXAM:     Base Eye Exam       Visual Acuity (Snellen - Linear)         Right Left    Dist sc 20/30 20/30    Near sc 20/30 20/30              Tonometry (Icare, 4:00 PM)         Right Left    Pressure 10 10              Pupils         Pupils    Right PERRL    Left PERRL              Visual Fields         Left Right     Full Full              Extraocular Movement         Right Left     Full, Ortho Full, Ortho              Neuro/Psych       Oriented x3: Yes    Mood/Affect: Normal              Dilation       Both eyes: 1.0% Mydriacyl and 2.5% Trav Synephrine @ 3:58 PM                  Slit Lamp and Fundus Exam       External Exam         Right Left    External Normal Normal              Slit Lamp Exam         Right Left    Lids/Lashes Dermatochalasis, Meibomian gland dysfunction Dermatochalasis, Meibomian gland dysfunction    Conjunctiva/Sclera Normal Normal    Cornea Crocodile shagreen Crocodile shagreen    Anterior Chamber Deep and quiet Deep and quiet    Iris Normal Normal    Lens PC IOL with trace opacity nasally PC IOL with trace opacity superiorly    Vitreous Vitreous floaters Vitreous floaters              Fundus Exam         Right Left    Disc Good rim, Temporal crescent Good rim, Temporal crescent    C/D Ratio 0.35 0.35    Macula Normal Normal    Vessels Normal Normal    Periphery Normal Normal                  Refraction       Wearing Rx       Type: No glasses              Manifest Refraction (Auto)         Sphere Cylinder Axis    Right -2.25 +2.75 165    Left -1.00 +0.50 005   Patient is happy without glasses                    ASSESSMENT/PLAN:     Diagnoses and Plan:     Pseudophakia of both eyes  No treatment    Vitreous floaters of both eyes   There is no evidence of retinal pathology. All signs and symptoms of retinal detachment/tears explained in detail. Patient instructed to call the office if they experience increase in floaters, increase in flashes of light, loss of vision or curtain or veil effect. Will see patient in 1 year for a complete exam    No orders of the defined types were placed in this encounter.       Meds This Visit:  Requested Prescriptions      No prescriptions requested or ordered in this encounter        Follow up instructions:  Return in about 1 year (around 12/6/2024) for complete exam.    12/6/2023  Scribed by: Mirta Mayfield MD

## 2024-02-07 ENCOUNTER — OFFICE VISIT (OUTPATIENT)
Dept: INTERNAL MEDICINE CLINIC | Facility: CLINIC | Age: 84
End: 2024-02-07
Payer: MEDICARE

## 2024-02-07 VITALS
RESPIRATION RATE: 16 BRPM | HEART RATE: 90 BPM | WEIGHT: 150 LBS | SYSTOLIC BLOOD PRESSURE: 134 MMHG | HEIGHT: 64 IN | DIASTOLIC BLOOD PRESSURE: 88 MMHG | BODY MASS INDEX: 25.61 KG/M2 | TEMPERATURE: 98 F

## 2024-02-07 DIAGNOSIS — R32 URINARY INCONTINENCE, UNSPECIFIED TYPE: ICD-10-CM

## 2024-02-07 DIAGNOSIS — I87.2 VENOUS STASIS DERMATITIS OF BOTH LOWER EXTREMITIES: ICD-10-CM

## 2024-02-07 DIAGNOSIS — I10 ESSENTIAL HYPERTENSION: Primary | ICD-10-CM

## 2024-02-07 PROCEDURE — 99213 OFFICE O/P EST LOW 20 MIN: CPT | Performed by: INTERNAL MEDICINE

## 2024-02-07 NOTE — PROGRESS NOTES
HPI:    Patient ID: Jeanette Frausto is a 83 year old female.    HPI  Patient is here for follow-up on chronic medical issues as listed below.  Last here 6 months ago for annual wellness visit and full blood work.  No changes made at that time.  Since that time she seen the eye doctor.  Current medications reviewed.  Health maintenance and vaccination status.    PT had cataract sugery done last year; vision is much better. No major issues at this time. She had an infected bug bite on AUgust; rx'd with Keflex; it resolved although still a scar there. Patient does check blood pressure at home. It has been running around 128/81, 130's/--. On not BP meds. Legs have been ok. Still with urinary incontinence.     Patient Active Problem List   Diagnosis    Presbyopia    Urinary incontinence    Essential hypertension    Osteopenia    Facet syndrome, lumbar    Primary osteoarthritis of right hip    Right groin pain    Vitreous floaters of both eyes    Orthopedic aftercare    Pseudophakia of both eyes          HISTORY:  Past Medical History:   Diagnosis Date    Anxiety state     Cataract     Fibroids     High blood pressure     Incontinence     bladder    Osteoarthritis     PONV (postoperative nausea and vomiting)     Trigger index finger of right hand 2010    cortisone injection, phys therapy    UTI (urinary tract infection) 2011    medication    Visual impairment     readers      Past Surgical History:   Procedure Laterality Date    APPENDECTOMY  01/01/1994    CATARACT EXTRACTION EXTRACAPSULAR W/ INTRAOCULAR LENS IMPLANTATION Left 09/05/2023    done by Dr. Carrizales    CATARACT EXTRACTION W/  INTRAOCULAR LENS IMPLANT Right 08/29/2023    Dr. Carrizales @ [facility]    HIP REPLACEMENT SURGERY Right 2022    HYSTERECTOMY  01/01/1994    [facility]    OOPHORECTOMY  01/01/1994    [facility]      Family History   Problem Relation Age of Onset    Diabetes Father     Heart Disorder Father     Breast Cancer Paternal Cousin Female 47    Heart Disorder Mother      Hypertension Mother     Glaucoma Neg     Ovarian Cancer Neg     Macular degeneration Neg       Social History     Socioeconomic History    Marital status: Single   Tobacco Use    Smoking status: Never    Smokeless tobacco: Never   Vaping Use    Vaping Use: Never used   Substance and Sexual Activity    Alcohol use: No    Drug use: No    Sexual activity: Not Currently     Partners: Male   Other Topics Concern    Caffeine Concern Yes     Comment: coffee, 2 cups/day    Right Handed Yes          Review of Systems          Current Outpatient Medications   Medication Sig Dispense Refill    triamcinolone 0.1 % External Cream Apply topically 2 (two) times daily as needed. 45 g 3    Betamethasone Dipropionate Aug 0.05 % External Ointment APPLY TO AFFECTED AREA TWICE A DAY IF NEEDED 45 g 1    Calcium Carbonate Antacid 600 MG Oral Chew Tab Chew 600 mg by mouth 2 (two) times daily.      Cholecalciferol (VITAMIN D3) 400 units Oral Tab Take by mouth.      loratadine 10 MG Oral Tab Take 1 tablet (10 mg total) by mouth daily as needed for Allergies.       Allergies:  Allergies   Allergen Reactions    Sulfamethoxazole SWELLING     Other reaction(s): lip and tongue swelling    Trimethoprim SWELLING     Other reaction(s): lip and tongue swelling    Erythromycin OTHER (SEE COMMENTS)     Other reaction(s): Shakiness    Erythromycin Base [Erythromycin Stearate] OTHER (SEE COMMENTS)    Other OTHER (SEE COMMENTS)    Ciprofloxacin RASH        PHYSICAL EXAM:   /80 (BP Location: Left arm, Patient Position: Sitting, Cuff Size: large)   Pulse 90   Temp 98 °F (36.7 °C) (Other)   Resp 16   Ht 5' 4\" (1.626 m)   Wt 150 lb (68 kg)   BMI 25.75 kg/m²      Physical Exam  Constitutional:       Appearance: Normal appearance. She is well-developed.   HENT:      Nose: Nose normal.      Mouth/Throat:      Pharynx: No oropharyngeal exudate or posterior oropharyngeal erythema.   Eyes:      Conjunctiva/sclera: Conjunctivae normal.   Neck:       Vascular: No carotid bruit.   Cardiovascular:      Rate and Rhythm: Normal rate.      Pulses: Normal pulses.      Heart sounds: Normal heart sounds. No murmur heard.     No friction rub. No gallop.   Pulmonary:      Effort: Pulmonary effort is normal.      Breath sounds: Normal breath sounds. No wheezing or rales.   Abdominal:      General: Bowel sounds are normal.      Palpations: Abdomen is soft. There is no mass.      Tenderness: There is no abdominal tenderness.   Musculoskeletal:         General: No tenderness.      Cervical back: Neck supple.      Right lower leg: No edema.      Left lower leg: No edema.   Lymphadenopathy:      Cervical: No cervical adenopathy.   Skin:     General: Skin is warm and dry.      Findings: No rash.   Neurological:      General: No focal deficit present.      Mental Status: She is alert.   Psychiatric:         Mood and Affect: Mood normal.         Behavior: Behavior normal.         Thought Content: Thought content normal.          Wt Readings from Last 6 Encounters:   02/07/24 150 lb (68 kg)   08/28/23 148 lb (67.1 kg)   08/23/23 148 lb (67.1 kg)   08/21/23 148 lb (67.1 kg)   08/02/23 148 lb 9.6 oz (67.4 kg)   04/12/23 145 lb 3.2 oz (65.9 kg)             ASSESSMENT/PLAN:   1. Essential hypertension blood pressure  Is a little bit borderline when checked by physician.  On no medications.  Will not start anything.  Encouraged healthy diet and regular exercise.  Maintain low-salt diet.  Follow-up in 6 months.    2. Urinary incontinence, unspecified type  Still present.  Discussed possibly seeing specialist.  Hold off on that for right now.  May consider urogynecology.    3. Venous stasis dermatitis of both lower extremities  Stable.         Meds This Visit:  Requested Prescriptions      No prescriptions requested or ordered in this encounter       Imaging & Referrals:  None         Jeffrey Quiroz MD

## 2024-08-07 ENCOUNTER — LAB ENCOUNTER (OUTPATIENT)
Dept: LAB | Age: 84
End: 2024-08-07
Attending: INTERNAL MEDICINE
Payer: MEDICARE

## 2024-08-07 ENCOUNTER — OFFICE VISIT (OUTPATIENT)
Dept: INTERNAL MEDICINE CLINIC | Facility: CLINIC | Age: 84
End: 2024-08-07
Payer: MEDICARE

## 2024-08-07 VITALS
HEIGHT: 64 IN | HEART RATE: 90 BPM | TEMPERATURE: 98 F | RESPIRATION RATE: 18 BRPM | DIASTOLIC BLOOD PRESSURE: 78 MMHG | WEIGHT: 148 LBS | SYSTOLIC BLOOD PRESSURE: 144 MMHG | BODY MASS INDEX: 25.27 KG/M2

## 2024-08-07 DIAGNOSIS — Z00.00 ENCOUNTER FOR ANNUAL HEALTH EXAMINATION: Primary | ICD-10-CM

## 2024-08-07 DIAGNOSIS — I10 ESSENTIAL HYPERTENSION: ICD-10-CM

## 2024-08-07 DIAGNOSIS — Z78.0 POSTMENOPAUSAL: ICD-10-CM

## 2024-08-07 DIAGNOSIS — I87.2 VENOUS STASIS DERMATITIS OF BOTH LOWER EXTREMITIES: ICD-10-CM

## 2024-08-07 DIAGNOSIS — Z12.31 ENCOUNTER FOR SCREENING MAMMOGRAM FOR BREAST CANCER: ICD-10-CM

## 2024-08-07 DIAGNOSIS — Z96.641 HISTORY OF TOTAL RIGHT HIP REPLACEMENT: ICD-10-CM

## 2024-08-07 DIAGNOSIS — R32 URINARY INCONTINENCE, UNSPECIFIED TYPE: ICD-10-CM

## 2024-08-07 DIAGNOSIS — M85.80 OSTEOPENIA, UNSPECIFIED LOCATION: ICD-10-CM

## 2024-08-07 LAB
ALBUMIN SERPL-MCNC: 4.5 G/DL (ref 3.2–4.8)
ALBUMIN/GLOB SERPL: 1.8 {RATIO} (ref 1–2)
ALP LIVER SERPL-CCNC: 115 U/L
ALT SERPL-CCNC: 20 U/L
ANION GAP SERPL CALC-SCNC: 5 MMOL/L (ref 0–18)
AST SERPL-CCNC: 23 U/L (ref ?–34)
BASOPHILS # BLD AUTO: 0.04 X10(3) UL (ref 0–0.2)
BASOPHILS NFR BLD AUTO: 0.5 %
BILIRUB SERPL-MCNC: 0.8 MG/DL (ref 0.2–1.1)
BILIRUB UR QL: NEGATIVE
BUN BLD-MCNC: 24 MG/DL (ref 9–23)
BUN/CREAT SERPL: 21.8 (ref 10–20)
CALCIUM BLD-MCNC: 10 MG/DL (ref 8.7–10.4)
CHLORIDE SERPL-SCNC: 111 MMOL/L (ref 98–112)
CHOLEST SERPL-MCNC: 179 MG/DL (ref ?–200)
CO2 SERPL-SCNC: 29 MMOL/L (ref 21–32)
CREAT BLD-MCNC: 1.1 MG/DL
DEPRECATED RDW RBC AUTO: 49.5 FL (ref 35.1–46.3)
EGFRCR SERPLBLD CKD-EPI 2021: 50 ML/MIN/1.73M2 (ref 60–?)
EOSINOPHIL # BLD AUTO: 0.59 X10(3) UL (ref 0–0.7)
EOSINOPHIL NFR BLD AUTO: 7 %
ERYTHROCYTE [DISTWIDTH] IN BLOOD BY AUTOMATED COUNT: 13.3 % (ref 11–15)
FASTING PATIENT LIPID ANSWER: YES
FASTING STATUS PATIENT QL REPORTED: YES
GLOBULIN PLAS-MCNC: 2.5 G/DL (ref 2–3.5)
GLUCOSE BLD-MCNC: 92 MG/DL (ref 70–99)
GLUCOSE UR-MCNC: NORMAL MG/DL
HCT VFR BLD AUTO: 39.2 %
HDLC SERPL-MCNC: 52 MG/DL (ref 40–59)
HGB BLD-MCNC: 12.8 G/DL
HGB UR QL STRIP.AUTO: NEGATIVE
IMM GRANULOCYTES # BLD AUTO: 0.03 X10(3) UL (ref 0–1)
IMM GRANULOCYTES NFR BLD: 0.4 %
KETONES UR-MCNC: NEGATIVE MG/DL
LDLC SERPL CALC-MCNC: 105 MG/DL (ref ?–100)
LEUKOCYTE ESTERASE UR QL STRIP.AUTO: 500
LYMPHOCYTES # BLD AUTO: 2.08 X10(3) UL (ref 1–4)
LYMPHOCYTES NFR BLD AUTO: 24.6 %
MCH RBC QN AUTO: 32.5 PG (ref 26–34)
MCHC RBC AUTO-ENTMCNC: 32.7 G/DL (ref 31–37)
MCV RBC AUTO: 99.5 FL
MONOCYTES # BLD AUTO: 0.63 X10(3) UL (ref 0.1–1)
MONOCYTES NFR BLD AUTO: 7.4 %
NEUTROPHILS # BLD AUTO: 5.1 X10 (3) UL (ref 1.5–7.7)
NEUTROPHILS # BLD AUTO: 5.1 X10(3) UL (ref 1.5–7.7)
NEUTROPHILS NFR BLD AUTO: 60.1 %
NONHDLC SERPL-MCNC: 127 MG/DL (ref ?–130)
OSMOLALITY SERPL CALC.SUM OF ELEC: 304 MOSM/KG (ref 275–295)
PH UR: 6 [PH] (ref 5–8)
PLATELET # BLD AUTO: 218 10(3)UL (ref 150–450)
POTASSIUM SERPL-SCNC: 4.5 MMOL/L (ref 3.5–5.1)
PROT SERPL-MCNC: 7 G/DL (ref 5.7–8.2)
PROT UR-MCNC: NEGATIVE MG/DL
RBC # BLD AUTO: 3.94 X10(6)UL
SODIUM SERPL-SCNC: 145 MMOL/L (ref 136–145)
SP GR UR STRIP: 1.02 (ref 1–1.03)
TRIGL SERPL-MCNC: 123 MG/DL (ref 30–149)
TSI SER-ACNC: 1.69 MIU/ML (ref 0.55–4.78)
UROBILINOGEN UR STRIP-ACNC: NORMAL
VLDLC SERPL CALC-MCNC: 21 MG/DL (ref 0–30)
WBC # BLD AUTO: 8.5 X10(3) UL (ref 4–11)
WBC #/AREA URNS AUTO: >50 /HPF

## 2024-08-07 PROCEDURE — 80061 LIPID PANEL: CPT

## 2024-08-07 PROCEDURE — 80053 COMPREHEN METABOLIC PANEL: CPT

## 2024-08-07 PROCEDURE — 85025 COMPLETE CBC W/AUTO DIFF WBC: CPT

## 2024-08-07 PROCEDURE — 87086 URINE CULTURE/COLONY COUNT: CPT | Performed by: INTERNAL MEDICINE

## 2024-08-07 PROCEDURE — 87186 SC STD MICRODIL/AGAR DIL: CPT | Performed by: INTERNAL MEDICINE

## 2024-08-07 PROCEDURE — 84443 ASSAY THYROID STIM HORMONE: CPT

## 2024-08-07 PROCEDURE — 36415 COLL VENOUS BLD VENIPUNCTURE: CPT

## 2024-08-07 PROCEDURE — 99213 OFFICE O/P EST LOW 20 MIN: CPT | Performed by: INTERNAL MEDICINE

## 2024-08-07 PROCEDURE — 81001 URINALYSIS AUTO W/SCOPE: CPT | Performed by: INTERNAL MEDICINE

## 2024-08-07 PROCEDURE — 87077 CULTURE AEROBIC IDENTIFY: CPT | Performed by: INTERNAL MEDICINE

## 2024-08-07 PROCEDURE — G0439 PPPS, SUBSEQ VISIT: HCPCS | Performed by: INTERNAL MEDICINE

## 2024-08-07 NOTE — PROGRESS NOTES
Subjective:   Jeanette Frausto is a 83 year old female who presents for a Medicare Subsequent Annual Wellness visit (Pt already had Initial Annual Wellness) and scheduled follow up of multiple significant but stable problems.     Patient is here requesting Medicare annual wellness visit and follow-up on chronic issues as listed below.  Last seen in the office on February 7.  Blood pressure was a little bit borderline at that time.  History of hypertension but not on any medications.  We did not start anything at that time.  Current medications reviewed.  Health maintenance and vaccination status reviewed with advanced directive reviewed.  Patient is due for full blood work.    Patient does check blood pressure at home. It has been running 119/70 this morning. It is never over 140/90 at home.  Things are going pretty good. 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 Still with urinary incontinence; she wears pads. Weight is down a couple pounds. For exercise, she mows the lawn; also does the seated cycle machine for 45 minutes, 3-4 times a week. Arthritis is not too bad; can feel it in back and shoulder. The hip replacement is mostly good; some soreness getting in and out of car. Diet is not specific. No tobacco or EtOH.     History/Other:   Fall Risk Assessment:   She has been screened for Falls and is High Risk. Fall Prevention information provided to patient in After Visit Summary.    Do you feel unsteady when standing or walking?: Yes  Do you worry about falling?: Yes  Have you fallen in the past year?: Yes  How many times have you fallen?: 1  Were you injured?: Yes     Cognitive Assessment:   She had a completely normal cognitive assessment - see flowsheet entries     Functional Ability/Status:   Jeanette Frausto has a completely normal functional assessment. See flowsheet for details.        Depression Screening (PHQ):  PHQ-2 SCORE: 0  , done 8/7/2024   If you checked off any  problems, how difficult have these problems made it for you to do your work, take care of things at home, or get along with other people?: Not difficult at all    Last [place] Suicide Screening on 8/7/2024 was No Risk.            Advanced Directives:   She does NOT have a Living Will. [Do you have a living will?: No]  She does NOT have a Power of  for Health Care. [Do you have a healthcare power of ?: No]  Discussed Advance Care Planning with patient (and family/surrogate if present). Standard forms made available to patient in After Visit Summary.      Patient Active Problem List   Diagnosis    Presbyopia    Urinary incontinence    Essential hypertension    Osteopenia    Facet syndrome, lumbar    Primary osteoarthritis of right hip    Right groin pain    Vitreous floaters of both eyes    Orthopedic aftercare    Pseudophakia of both eyes     Allergies:  She is allergic to sulfamethoxazole, trimethoprim, erythromycin, erythromycin base [erythromycin stearate], other, and ciprofloxacin.    Current Medications:  Outpatient Medications Marked as Taking for the 8/7/24 encounter (Office Visit) with Jeffrey Quiroz MD   Medication Sig    triamcinolone 0.1 % External Cream Apply topically 2 (two) times daily as needed.    Betamethasone Dipropionate Aug 0.05 % External Ointment APPLY TO AFFECTED AREA TWICE A DAY IF NEEDED    Calcium Carbonate Antacid 600 MG Oral Chew Tab Chew 600 mg by mouth 2 (two) times daily.    Cholecalciferol (VITAMIN D3) 400 units Oral Tab Take by mouth.    loratadine 10 MG Oral Tab Take 1 tablet (10 mg total) by mouth daily as needed for Allergies.       Medical History:  She  has a past medical history of Anxiety state, Cataract, Fibroids, High blood pressure, Incontinence, Osteoarthritis, PONV (postoperative nausea and vomiting), Trigger index finger of right hand (2010), UTI (urinary tract infection) (2011), and Visual impairment.  Surgical History:  She  has a past surgical  history that includes appendectomy (01/01/1994); hysterectomy (01/01/1994); oophorectomy (01/01/1994); hip replacement surgery (Right, 2022); Cataract extraction w/  intraocular lens implant (Right, 08/29/2023); and Cataract extraction, extracapsular w/ intraocular lens implant (Left, 09/05/2023).   Family History:  Her family history includes Breast Cancer (age of onset: 47) in her paternal cousin female; Diabetes in her father; Heart Disorder in her father and mother; Hypertension in her mother.  Social History:  She  reports that she has never smoked. She has never used smokeless tobacco. She reports that she does not drink alcohol and does not use drugs.    Tobacco:  She has never smoked tobacco.    CAGE Alcohol Screen:   CAGE screening score of 0 on 8/7/2024, showing low risk of alcohol abuse.      Patient Care Team:  Jeffrey Quiroz MD as PCP - General (Internal Medicine)    Review of Systems       Objective:   Physical Exam  Constitutional:       Appearance: Normal appearance. She is well-developed.   HENT:      Right Ear: Tympanic membrane and ear canal normal.      Left Ear: Tympanic membrane and ear canal normal.      Nose: Nose normal.      Mouth/Throat:      Pharynx: No oropharyngeal exudate or posterior oropharyngeal erythema.   Eyes:      Conjunctiva/sclera: Conjunctivae normal.      Pupils: Pupils are equal, round, and reactive to light.   Neck:      Thyroid: No thyromegaly.      Vascular: No carotid bruit.   Cardiovascular:      Rate and Rhythm: Normal rate.      Pulses: Normal pulses.      Heart sounds: Normal heart sounds. No murmur heard.     No friction rub. No gallop.   Pulmonary:      Effort: Pulmonary effort is normal.      Breath sounds: Normal breath sounds. No wheezing or rales.   Abdominal:      General: Bowel sounds are normal.      Palpations: Abdomen is soft. There is no mass.      Tenderness: There is no abdominal tenderness.   Musculoskeletal:         General: No tenderness.       Cervical back: Neck supple.      Right lower leg: No edema.      Left lower leg: No edema.      Comments: Kyphosis of upper spine   Lymphadenopathy:      Cervical: No cervical adenopathy.   Skin:     General: Skin is warm and dry.      Findings: No rash.      Comments: Chronic macular brownish discoloration lower legs.   Neurological:      General: No focal deficit present.      Mental Status: She is alert.      Cranial Nerves: No cranial nerve deficit.      Coordination: Coordination normal.   Psychiatric:         Mood and Affect: Mood normal.         Behavior: Behavior normal.         Thought Content: Thought content normal.            /78   Pulse 90   Temp 98 °F (36.7 °C) (Other)   Resp 18   Ht 5' 4\" (1.626 m)   Wt 148 lb (67.1 kg)   BMI 25.40 kg/m²  Estimated body mass index is 25.4 kg/m² as calculated from the following:    Height as of this encounter: 5' 4\" (1.626 m).    Weight as of this encounter: 148 lb (67.1 kg).    Medicare Hearing Assessment:   Hearing Screening    Screening Method: Questionnaire  I have a problem hearing over the telephone: No I have trouble following the conversations when two or more people are talking at the same time: No   I have trouble understanding things on the TV: No I have to strain to understand conversations: No   I have to worry about missing the telephone ring or doorbell: No I have trouble hearing conversations in a noisy background such as a crowded room or restaurant: No   I get confused about where sounds come from: No I misunderstand some words in a sentence and need to ask people to repeat themselves: No   I especially have trouble understanding the speech of women and children: No I have trouble understanding the speaker in a large room such as at a meeting or place of Yarsanism: Sometimes   Many people I talk to seem to mumble (or don't speak clearly): No People get annoyed because I misunderstand what they say: No   I misunderstand what others are saying  and make inappropriate responses: No I avoid social activities because I cannot hear well and fear I will reply improperly: No   Family members and friends have told me they think I may have hearing loss: No             Visual Acuity:   Right Eye Visual Acuity: Corrected Right Eye Chart Acuity: 20/30   Left Eye Visual Acuity: Corrected Left Eye Chart Acuity: 20/30   Both Eyes Visual Acuity: Corrected Both Eyes Chart Acuity: 20/25   Able To Tolerate Visual Acuity: Yes        Assessment & Plan:   Jeanette Frausto is a 83 year old female who presents for a Medicare Assessment.     1. Encounter for annual health examination  Physical exam is unremarkable other than mildly elevated blood pressure and kyphosis.  Active issues as below.  Health maintenance issues reviewed.  Given printed information about advanced directives.  Strongly encouraged to at least get medical power of  taking care of.  Continue with healthy diet and regular exercise.    2. Essential hypertension  Borderline elevation today.  He is normal at home.  On no medications.  Check blood work.  Follow-up in 3 months for quick blood pressure check.  May consider low-dose treatment if still elevated.  May consider lisinopril hyper 10 mg a day.  - CBC With Differential With Platelet; Future  - Comp Metabolic Panel (14); Future  - Lipid Panel; Future  - TSH W Reflex To Free T4; Future    3. Urinary incontinence, unspecified type  Check urinalysis.  Refer to urogynecology.  Skin to the point now where the patient is willing to do something about it.  - Urogynecology Referral - In Network  - Urinalysis with Culture Reflex    4. Venous stasis dermatitis of both lower extremities  Stable.  No significant swelling.  No skin breakdown.    5. Osteopenia, unspecified location  Continue calcium and vitamin D.  Check bone density study.  - XR DEXA BONE DENSITOMETRY (CPT=77080); Future    6. Postmenopausal  Check bone density study.  - XR DEXA BONE DENSITOMETRY  (CPT=77080); Future    7. History of total right hip replacement  Overall doing reasonably well.  The hip replacement has helped her.    8. Encounter for screening mammogram for breast cancer  Given order for mammogram.  - [facility] ROXANA 2D+3D SCREENING BILAT (CPT=77067/82677); Future    The patient indicates understanding of these issues and agrees to the plan.  Reinforced healthy diet, lifestyle, and exercise.      Return in about 3 months (around 11/7/2024).     Jeffrey Quiroz MD, 8/7/2024     Supplementary Documentation:   General Health:  In the past six months, have you lost more than 10 pounds without trying?: 2 - No  Has your appetite been poor?: No  Type of Diet: Balanced  How does the patient maintain a good energy level?: Appropriate Exercise  How would you describe your daily physical activity?: Moderate  How would you describe your current health state?: Good  How do you maintain positive mental well-being?: Social Interaction  On a scale of 0 to 10, with 0 being no pain and 10 being severe pain, what is your pain level?: 0 - (None)  In the past six months, have you experienced urine leakage?: 1-Yes  At any time do you feel concerned for the safety/well-being of yourself and/or your children, in your home or elsewhere?: No  Have you had any immunizations at another office such as Influenza, Hepatitis B, Tetanus, or Pneumococcal?: Yes    Health Maintenance   Topic Date Due    Zoster Vaccines (1 of 2) Never done    Annual Depression Screening  01/01/2024    COVID-19 Vaccine (6 - 2023-24 season) 03/01/2024    Annual Physical  08/02/2024    Influenza Vaccine (1) 10/01/2024    DEXA Scan  Completed    Fall Risk Screening (Annual)  Completed    Pneumococcal Vaccine: 65+ Years  Completed

## 2024-09-11 ENCOUNTER — HOSPITAL ENCOUNTER (OUTPATIENT)
Dept: BONE DENSITY | Facility: HOSPITAL | Age: 84
Discharge: HOME | End: 2024-09-11
Attending: INTERNAL MEDICINE
Payer: MEDICARE

## 2024-09-11 DIAGNOSIS — M85.80 OSTEOPENIA, UNSPECIFIED LOCATION: ICD-10-CM

## 2024-09-11 DIAGNOSIS — Z78.0 POSTMENOPAUSAL: ICD-10-CM

## 2024-09-11 PROCEDURE — 77080 DXA BONE DENSITY AXIAL: CPT | Performed by: INTERNAL MEDICINE

## 2024-09-18 ENCOUNTER — OFFICE VISIT (OUTPATIENT)
Dept: UROLOGY | Facility: HOSPITAL | Age: 84
End: 2024-09-18
Attending: OBSTETRICS & GYNECOLOGY
Payer: MEDICARE

## 2024-09-18 VITALS
HEIGHT: 64 IN | SYSTOLIC BLOOD PRESSURE: 125 MMHG | WEIGHT: 148 LBS | DIASTOLIC BLOOD PRESSURE: 85 MMHG | BODY MASS INDEX: 25.27 KG/M2

## 2024-09-18 DIAGNOSIS — N39.42 INCONTINENCE WITHOUT SENSORY AWARENESS: Primary | ICD-10-CM

## 2024-09-18 DIAGNOSIS — R35.1 NOCTURIA: ICD-10-CM

## 2024-09-18 DIAGNOSIS — N95.2 POSTMENOPAUSAL ATROPHIC VAGINITIS: ICD-10-CM

## 2024-09-18 DIAGNOSIS — N39.3 FEMALE STRESS INCONTINENCE: ICD-10-CM

## 2024-09-18 DIAGNOSIS — N81.84 PELVIC MUSCLE WASTING: ICD-10-CM

## 2024-09-18 DIAGNOSIS — N39.41 URGE INCONTINENCE: ICD-10-CM

## 2024-09-18 LAB
BILIRUB UR QL: NEGATIVE
CLARITY UR: CLEAR
CONTROL RUN WITHIN 24 HOURS?: YES
GLUCOSE UR-MCNC: NORMAL MG/DL
HGB UR QL STRIP.AUTO: NEGATIVE
KETONES UR-MCNC: NEGATIVE MG/DL
LEUKOCYTE ESTERASE UR QL STRIP.AUTO: 25
NITRITE UR QL STRIP.AUTO: NEGATIVE
NITRITE URINE: NEGATIVE
PH UR: 7 [PH] (ref 5–8)
PROT UR-MCNC: NEGATIVE MG/DL
SP GR UR STRIP: 1.02 (ref 1–1.03)
UROBILINOGEN UR STRIP-ACNC: NORMAL

## 2024-09-18 PROCEDURE — 87086 URINE CULTURE/COLONY COUNT: CPT | Performed by: OBSTETRICS & GYNECOLOGY

## 2024-09-18 PROCEDURE — 99212 OFFICE O/P EST SF 10 MIN: CPT

## 2024-09-18 PROCEDURE — 81001 URINALYSIS AUTO W/SCOPE: CPT | Performed by: OBSTETRICS & GYNECOLOGY

## 2024-09-18 PROCEDURE — 81002 URINALYSIS NONAUTO W/O SCOPE: CPT | Performed by: OBSTETRICS & GYNECOLOGY

## 2024-09-18 RX ORDER — ESTRADIOL 0.1 MG/G
CREAM VAGINAL
Qty: 42.5 G | Refills: 3 | Status: SHIPPED | OUTPATIENT
Start: 2024-09-18

## 2024-09-18 NOTE — PROGRESS NOTES
April Rhodes,   2024     Referred by Dr. Quiroz  Pt here with self    Chief Complaint   Patient presents with    Incontinence    Other     UUI, ESTRADA, and w/o sensory awareness       HPI:  +ESTRADA  +UUI  Nocturia x2  Insens loss  Denies prolapse sx  JESI in  BSO for fibroids  Not sexually active, n/a dyspareunia  Reg bowels, loose when nervous (uses imodium)    PRIOR TREATMENTS:    Kegels  Physical Therapy reports min improvement    no UTIs  No gross hematuria        Virginal    HTN  Works as a     Vitals:  /85 (BP Location: Right arm)   Ht 64\"   Wt 148 lb (67.1 kg)   BMI 25.40 kg/m²      HISTORY:  Past Medical History:    Anxiety state    Cataract    Fibroids    High blood pressure    Incontinence    bladder    Osteoarthritis    PONV (postoperative nausea and vomiting)    Trigger index finger of right hand    cortisone injection, phys therapy    UTI (urinary tract infection)    medication    Visual impairment    readers      Past Surgical History:   Procedure Laterality Date    Appendectomy  1994    Cataract extraction extracapsular w/ intraocular lens implantation Left 2023    done by Dr. Carrizales    Cataract extraction w/  intraocular lens implant Right 2023    Dr. Carrizales @ [facility]    Hip replacement surgery Right     Hysterectomy  1994    JESI    Oophorectomy  1994    BSO      Family History   Problem Relation Age of Onset    Diabetes Father     Heart Disorder Father     Breast Cancer Paternal Cousin Female 47    Heart Disorder Mother     Hypertension Mother     Glaucoma Neg     Ovarian Cancer Neg     Macular degeneration Neg       Social History     Socioeconomic History    Marital status: Single   Tobacco Use    Smoking status: Never    Smokeless tobacco: Never   Vaping Use    Vaping status: Never Used   Substance and Sexual Activity    Alcohol use: No    Drug use: No    Sexual activity: Not Currently     Partners: Male   Other Topics Concern    Caffeine  Concern Yes     Comment: coffee, 2 cups/day    Right Handed Yes        Allergies:  Allergies   Allergen Reactions    Sulfamethoxazole SWELLING     Other reaction(s): lip and tongue swelling    Trimethoprim SWELLING     Other reaction(s): lip and tongue swelling    Erythromycin OTHER (SEE COMMENTS)     Other reaction(s): Shakiness    Erythromycin Base [Erythromycin Stearate] OTHER (SEE COMMENTS)    Other OTHER (SEE COMMENTS)    Ciprofloxacin RASH       Medications:  Outpatient Medications Prior to Visit   Medication Sig Dispense Refill    triamcinolone 0.1 % External Cream Apply topically 2 (two) times daily as needed. 45 g 3    Calcium Carbonate Antacid 600 MG Oral Chew Tab Chew 600 mg by mouth 2 (two) times daily.      Cholecalciferol (VITAMIN D3) 400 units Oral Tab Take by mouth.      Betamethasone Dipropionate Aug 0.05 % External Ointment APPLY TO AFFECTED AREA TWICE A DAY IF NEEDED (Patient not taking: Reported on 9/18/2024) 45 g 1    loratadine 10 MG Oral Tab Take 1 tablet (10 mg total) by mouth daily as needed for Allergies. (Patient not taking: Reported on 9/18/2024)       No facility-administered medications prior to visit.       Urogynecology Summary:  Urogynecology Summary  Prolapse: No  ESTRADA: Yes  Urge Incontinence: Yes (and w/o sensory awareness)  Nocturia Frequency: 2  Frequency: 1 - 2 hours  Incomplete emptying: No  Constipation: No (reports diarrhea when anxious)  Wears pad day?: 4  Wears Pad Night?: 4  Activities are limited by UI/POP?: No  Currently Sexually Active: No  Avoids sexual activity due to: Other    Review of Systems:    A comprehensive 12 point review of systems was completed.  Pertinent positives noted in the the HPI.  No CP  No SOB    GENERAL EXAM:  GENERAL:  Alert and Oriented, and NAD  HEENT:  Normal, no lesions  LUNGS:  Normal effort  HEART:  RRR  ABDOMEN: soft, no mass, no hernia  EXTREM:  Normal, no edema  SKIN:  Normal, no lesions    PELVIC EXAM:  Ext. Gen: +atrophy, no  lesions  Urethra: +atrophy, nontender  Bladder:+fullness, nontender  Vagina: +atrophy  Cervix & Uterus: absent  Adnexa:no masses, nontender  Perineum: nontender  Anus: wnl  Rectum: defer    PELVIS FLOOR NEUROMUSCULAR FUNCTION:  Strength:  1, Unable to hold greater than 3 sec, and Increased tone  Perineal Sensation:  Normal      PELVIC SUPPORT:  [place]:  0  Ant:  0  Post:  0  CST:  positive  UVJ: +hypermobile    Pt examined with chaperone, RN    Impression/Plan:    ICD-10-CM    1. Incontinence without sensory awareness  N39.42       2. Urge incontinence  N39.41       3. Female stress incontinence  N39.3       4. Nocturia  R35.1       5. Pelvic muscle wasting  N81.84       6. Postmenopausal atrophic vaginitis  N95.2 estradiol (ESTRACE) 0.1 MG/GM Vaginal Cream          Discussion Items:   Urodynamics and cystoscopy for evaluation of LUTS  Behavioral and pharmacologic treatments for OAB  Nonsurgical and surgical treatments for Stress Urinary Incontinence  Pelvic muscle rehabilitation including pelvic floor PT  Topical estrogen therapy for treating UGA  Surgical Discussion: We discussed the risks, benefits, goals and alternatives to surgical approaches for pelvic floor disorders including, but not limited to, bleeding, infection, pelvic organ damage, recurrent prolapse, recurrent stress incontinence, de adarsh OAB, pain, sexual dysfunction, voiding dysfunction, long-term catheterization and re-operation.  Post-op restrictions and expectations reviewed.  Discussed dietary and behavioral modification, discussed pharmacologic and nonpharmacologic mgmt options for urinary symptoms.     Diagnostic Items:  Urodynamics  Urine testing    Medications Discussed:  Estrace Cream  OAB meds    Treatment Plan, Non-surgical:   RN teaching/pt education done    Treatment Plan, Surgical: mentioned  Mid-urethral slings (Trans Vaginal Taping, TOT, single-incision)    Pt verbalizes understanding of all above discussed information. All questions  answered. She agrees to plan    Return for UDS, f/u.    April Rhodes, , FACOG, FACS      Discussion undertaken in English, info provided      The 21st Century Cures Act makes medical notes like these available to patients in the interest of transparency. However, be advised this is a medical document. It is intended as peer to peer communication. It is written in medical language and may contain abbreviations or verbiage that are unfamiliar. It may appear blunt or direct. Medical documents are intended to carry relevant information, facts as evident, and the clinical opinion of the practitioner.

## 2024-09-25 ENCOUNTER — HOSPITAL ENCOUNTER (OUTPATIENT)
Dept: MAMMOGRAPHY | Facility: HOSPITAL | Age: 84
Discharge: HOME | End: 2024-09-25
Attending: INTERNAL MEDICINE
Payer: MEDICARE

## 2024-09-25 DIAGNOSIS — Z12.31 ENCOUNTER FOR SCREENING MAMMOGRAM FOR BREAST CANCER: ICD-10-CM

## 2024-09-25 PROCEDURE — 77063 BREAST TOMOSYNTHESIS BI: CPT | Performed by: INTERNAL MEDICINE

## 2024-09-25 PROCEDURE — 77067 SCR MAMMO BI INCL CAD: CPT | Performed by: INTERNAL MEDICINE

## 2024-10-02 ENCOUNTER — HOSPITAL ENCOUNTER (OUTPATIENT)
Dept: MAMMOGRAPHY | Facility: HOSPITAL | Age: 84
Discharge: HOME | End: 2024-10-02
Attending: INTERNAL MEDICINE
Payer: MEDICARE

## 2024-10-02 DIAGNOSIS — R92.8 ABNORMAL MAMMOGRAM: ICD-10-CM

## 2024-10-09 ENCOUNTER — HOSPITAL ENCOUNTER (OUTPATIENT)
Dept: ULTRASOUND IMAGING | Facility: HOSPITAL | Age: 84
Discharge: HOME | End: 2024-10-09
Attending: INTERNAL MEDICINE
Payer: MEDICARE

## 2024-10-09 ENCOUNTER — HOSPITAL ENCOUNTER (OUTPATIENT)
Dept: MAMMOGRAPHY | Facility: HOSPITAL | Age: 84
Discharge: HOME | End: 2024-10-09
Attending: INTERNAL MEDICINE
Payer: MEDICARE

## 2024-10-09 DIAGNOSIS — R92.8 ABNORMAL MAMMOGRAM: ICD-10-CM

## 2024-10-09 PROCEDURE — 77065 DX MAMMO INCL CAD UNI: CPT | Performed by: INTERNAL MEDICINE

## 2024-10-09 PROCEDURE — 76642 ULTRASOUND BREAST LIMITED: CPT | Performed by: INTERNAL MEDICINE

## 2024-10-09 PROCEDURE — 77061 BREAST TOMOSYNTHESIS UNI: CPT | Performed by: INTERNAL MEDICINE

## 2024-10-09 NOTE — IMAGING NOTE
This nurse introduced self and role of breast coordinator.  Discussed recommended breast biopsy with patient. Pt was recommended by Dr. Dickinson to have a right  breast . Pt is single does not have any children. Pt works FT at [employer]. Has every Wed off  but also Monday 10/14 requesting either day for biopsy. Pt has super orders placed was provided Monday 10/14 checking in at 1015 am [facility].ultrasound guided biopsy.    Pt history discussed as below:  Pt history of biopsy: no         Family history of cancer:no  Pt history of breast cancer:no  Hx BCP use:   no                 HRT use:  no ivf no         RECOMMENDATIONS:   ULTRASOUND-GUIDED CORE BIOPSY: RIGHT BREAST      SHORT TERM FOLLOW-UP DIAGNOSTIC MAMMOGRAM RIGHT BREAST IN 6 MONTHS.       SHORT TERM FOLLOW-UP ULTRASOUND RIGHT BREAST IN 6 MONTHS.          Recommedations :                      see [facility] for dictated radiology report   Reviewed pertinent patient history, family history of cancer, and patient medications.     -All herbal supplements, Vitamin E, Fish Oil    -All NSAIDs (Ibuprofen, Motrin, Advil, Aleve, or other antiinflammatory medication)  and Aspirin  81mg currently being taken    not  recommended or prescribed by  your physician  should be held for 5  days prior to biopsy.  Denies usage   -Aspirin 81 mg being taken related to a cardiac condition  or prescribed by your  physician should be held at the  direction of your physician.  Informed patient to call ordering physician for guidelines denies usage    -Blood thinners/antiplatelet medications (Coumadin, Plavix ect) should be held at the  direction of your physician.  Informed patient to call ordering physician for guidelines denies usage   Reviewed US guided biopsy procedure, as below.  You will be lying on your back, potentially slightly toward one side, for this procedure.  The US technician will use the ultrasound machine to locate the area in question that was seen on your  previous breast imaging.  The Radiologist will then inject a local numbing medication into the area. This may burn and sting for several seconds .  Then use a needle to collect cells or tissue from the site.  A marker, or clip, will then be placed in the biopsied area.  This marker is placed so this biopsy site is able to be accurately located upon future breast imaging.  After the clip is placed, steri strips will be applied to  the biopsy site and should be kept on for 5 days.  Additional mammography films will then be taken to assure correct placement of the placed marker.    Educated the patient they will be awake during this procedure and are able to drive themselves home if they wish.  Educated patient that they should eat breakfast and park in green lot and check in with [facility] .  Educated patient that some soreness  may occur after biopsy.  Discussed use of a supportive bra and ice packs after procedure, to decrease soreness.  Tylenol only for discomfort unless they have an allergy to tylenol .  Discussed with patient no swimming, bathing,  hot tubs or submerging underwater  for 5 days post procedure until the incision is closed and healed.   Educated patient on lifting restrictions - nothing heavier than a gallon of milk for 24-48 hours after the procedure.      Discussed with patient that some soreness and bruising is normal after biopsy but that prolonged or increased pain and bruising should be reported to the ordering physician.   Educated patient that they should bring a sports bra or form fitting bra on day of procedure. Educated patient that this helps with comfort after the biopsy and decrease swelling.  Reviewed results process with patient and shared that pathology results will be available within 2-3 business days of their biopsy.  Discussed results will be communicated by their ordering physician unless otherwise indicated.  Educated patient that once we receive an order from her  physician  our radiology secretaries would be calling   to schedule the biopsy.

## 2024-10-11 NOTE — DISCHARGE INSTRUCTIONS
The Doctor (Radiologist) who performed your procedure was: Dr Dickinson  Place an ice pack over the biopsy site on top of your bra or on top of the ACE wrap (never apply ice directly over skin) for 10-15 minutes of every hour until bedtime for your comfort and to decrease bleeding.  Keep your sports bra or the ACE wrap (stereotactic and MRI biopsy) in place for 24 hours after your biopsy. This compression decreases bleeding and breast movement for your comfort. Wear a supportive bra for the next couple of days for comfort (sports bra for sleep).   Continue to wear, preferably, a sports bra or good supportive bra for 1 week and take off only to shower.  No baths or showers during the first 24 hours after biopsy. After this time you may take a shower. It's okay if the strips get wet but do not soak them. NO saunas, hot tubs or swimming until steri-strips fall off (approx. 5 days). This prevents infection and allows time for them to completely close and heal.  DO NOT remove the steri-strips. They will fall off in 5 days. If any type of irritation (redness, itching or blisters) develops in the area around the steri-strips, remove them gently. If the steri-strips do not fall off after 5 days, gently remove them. Keep the area clean and dry.  It is normal to have mild discomfort and bruising at the biopsy site.  You may take Tylenol as needed for discomfort, as long as you have no allergies to Tylenol. Do not take aspirin, motrin, ibuprofen or any medication containing NSAID (non-steroidal anti-inflammatory drug) product for 48 hours.  DO NOT participate in strenuous activity (aerobics, heavy lifting, housework, gardening, etc.) 48 hours after your biopsy to prevent bleeding.  You will receive results in 2-3 business days.  If you are having an MRI breast biopsy or an Ultrasound guided breast biopsy, you will be billed for the biopsy and unilateral mammogram separately.  If you have any questions about the procedure or  your results, please contact the Breast Care Coordinator Nurse at [phone].  Notify your ordering physician or primary physician for increased bleeding, pain or fever over 100. Or contact a Radiology Nurse at [phone] between 8am-4pm (after 4pm, your call will be directed to the [facility]).

## 2024-10-14 ENCOUNTER — HOSPITAL ENCOUNTER (OUTPATIENT)
Dept: MAMMOGRAPHY | Facility: HOSPITAL | Age: 84
Discharge: HOME | End: 2024-10-14
Attending: INTERNAL MEDICINE
Payer: MEDICARE

## 2024-10-14 ENCOUNTER — HOSPITAL ENCOUNTER (OUTPATIENT)
Dept: ULTRASOUND IMAGING | Facility: HOSPITAL | Age: 84
Discharge: HOME | End: 2024-10-14
Attending: INTERNAL MEDICINE
Payer: MEDICARE

## 2024-10-14 DIAGNOSIS — N63.10 BREAST MASS, RIGHT: ICD-10-CM

## 2024-10-14 PROCEDURE — 19083 BX BREAST 1ST LESION US IMAG: CPT | Performed by: INTERNAL MEDICINE

## 2024-10-14 PROCEDURE — 77065 DX MAMMO INCL CAD UNI: CPT | Performed by: INTERNAL MEDICINE

## 2024-10-14 PROCEDURE — 88305 TISSUE EXAM BY PATHOLOGIST: CPT | Performed by: INTERNAL MEDICINE

## 2024-10-14 NOTE — PROCEDURES
[facility]  part of [facility]  Procedure Note    Jeanette Frausto Patient Status:  Outpatient    1940 MRN [id number]   Location [facility] ULTRASOUND [facility] Attending Jeffrey Quiroz MD   Hosp Day # 0 PCP Jeffrey Quiroz MD     Procedure: Ultrasound guided right breast biopsy    Pre-Procedure Diagnosis:  Right breast mass    Post-Procedure Diagnosis: Same    Anesthesia:  Local    Findings:  Right breast mass 9:00, 4 cm from nipple    Specimens: 14 gauge core biopsy x 2    Blood Loss:  None    Tourniquet Time: N/A  Complications:  None  Drains:  None    Secondary Diagnosis:  None    Amish Johnson MD  10/14/2024

## 2024-10-15 ENCOUNTER — TELEPHONE (OUTPATIENT)
Dept: ULTRASOUND IMAGING | Facility: HOSPITAL | Age: 84
End: 2024-10-15

## 2024-10-15 NOTE — TELEPHONE ENCOUNTER
Jeanette Frausto is s/p biopsy .  Phoned and introduced myself as breast coordinator .  Reinforced to patient  post biopsy care and instructions .  No c/o post bx    Informed  and shared the pathology results as well as the recommendations from Dr Johnson  for her breast imaging  as follows:      Pathology results shared (see epic for dictated pathology and radiology procedure report)  and recommendations are as follows:       Pathology demonstrates benign findings and is concordant with imaging.       RECOMMENDATION:   A follow-up diagnostic right breast mammogram and limited right breast ultrasound are recommended in 6 months.       Jeanette Frausto,  acknowledges the above and denies questions. Jeanette Frausto was also instructed to perform breast self exams and if any changes  develops any changes to contact ordering  physician immediately  for re evaluation..  Jeanette Fraustoverbalizes understanding and agreement.

## 2024-11-11 ENCOUNTER — OFFICE VISIT (OUTPATIENT)
Dept: UROLOGY | Facility: HOSPITAL | Age: 84
End: 2024-11-11
Attending: OBSTETRICS & GYNECOLOGY
Payer: MEDICARE

## 2024-11-11 VITALS — WEIGHT: 148 LBS | BODY MASS INDEX: 25.27 KG/M2 | HEIGHT: 64 IN | RESPIRATION RATE: 18 BRPM

## 2024-11-11 DIAGNOSIS — N39.41 URGE INCONTINENCE: Primary | ICD-10-CM

## 2024-11-11 DIAGNOSIS — N39.3 FEMALE STRESS INCONTINENCE: ICD-10-CM

## 2024-11-11 LAB
CONTROL RUN WITHIN 24 HOURS?: YES
LEUKOCYTE ESTERASE URINE: NEGATIVE
NITRITE URINE: NEGATIVE

## 2024-11-11 PROCEDURE — 81002 URINALYSIS NONAUTO W/O SCOPE: CPT

## 2024-11-11 PROCEDURE — 51797 INTRAABDOMINAL PRESSURE TEST: CPT

## 2024-11-11 PROCEDURE — 51729 CYSTOMETROGRAM W/VP&UP: CPT

## 2024-11-11 PROCEDURE — 51741 ELECTRO-UROFLOWMETRY FIRST: CPT

## 2024-11-11 PROCEDURE — 51784 ANAL/URINARY MUSCLE STUDY: CPT

## 2024-11-11 NOTE — PROCEDURES
Patient here for urodynamic testing.  Procedure explained and confirmed by patient.  See evaluation form for results.  Both verbal and written discharge instructions were given.  Patient tolerated procedure well and will follow up with Dr. April Rhodes via televisit on 24 @ 12:15pm.    URODYNAMIC EVALUATION    PATIENT HISTORY:    Prolapse:  No  ESTRADA:  Yes  UUI:  Yes (Reports more bothersome)  Nocturia:  2 (Reports enuresis, most bothersome)  Frequency:  every 1-2 hours  Sense of Incomplete Emptying:  No  Constipation:  No (Reports loose stools. Discussed bowel regimen, fiber usage)  Last void prior to UDS testin mins  Current urge to void?  None  OAB meds stopped prior to test?  NA  Other symptoms?      Surgery?  [x]  No      Surgical folder provided?  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  []  Yes  [x]  No  []  Pessary  []  Manual  []  Half Speculum    Additional Notes:    UROFLOWMETRY:  Unreduced  Voided Volume:                0              mL  Maximum Flow Rate:                   N/A            mL/sec  Average flow rate:                N/A           mL/sec  Post-void Residual:              65            mL  Pattern:  []  Normal  [x]  Poor flow     [x]   Intermittent []  Other  Void:   []  Typical  []  Atypical    Additional Notes:    CYSTOMETRY:  Urethral Catheter:  Fr 7 / tdoc  Abd Catheter:     Fr 7 / tdoc   Infusion:  Water Rate 40 mL/min  Temp:  Room  Position:  [x]  Sit  []  Stand  []  Supine  First sensation:   32 mL  First desire to void:   168 mL  Strong desire to void:  228 mL  Maximum cystometric capacity:  275 mL  Detrusor Activity:  [x]  Unstable   []  Stable  Urge leakage?    [x]  Yes []  No  Volume at 1st unhibited detrusor cont:   97 mL  Detrusor instability provoked by:    [x]  Spontaneous []  Coughing  [x]  Filling  []  Valsalva  []  Other    Additional Notes:      URETHRAL FUNCTION:  Valsava (vesical) Leak Point Pressures:    Volume Leak Point Pressure Leak?    Cough Valsalva      100mL 153 181    cm H2O [x]  Yes []  No   200mL 186 201    cm H2O [x]  Yes []  No     Genuine Stress Incontinence demonstrated?   [x]  Yes, @ 100mL, unreduced, in the absence of an uninhibited contraction.    Resting Urethral Pressure Profile:     Functional Urethral Length:         0.6 cm            0.7     cm     Maximum UCP:           55 cm            47  cm       PRESSURE/FLOW STUDY:  Unreduced  Voided volume:   225+ (missed commode)     mL  Maximum flow rate:   15     mL/sec  Pressure Detrusor (at maximum flow):         32   cm H2O  Post void residual: 0             mL  Voiding mechanism:  []  Abnormal  [x]  Normal  []  Strain to void   []  Weak detrusor  Void:   [x]  Typical   []  Atypical    Additional Notes:  Uroflowmetry, cystometry, and pressure / flow study were completed using calibrated electronic equipment and air charged transducers.    EMG:  During fill: Reactive    During flow study: Reactive    11/11/2024 11:13 AM     PERFORMED BY:  Nicole ARORA RN      URODYNAMIC PHYSICIAN INTERPRETATION    IMPRESSION:   0/65cc &225+/0  senior living 275cc  DO @ 97cc  ESTRADA @ 100mL, unreduced, in the absence of an uninhibited contraction   Post-Procedure Diagnoses: Detrusor  instability [N31.9] and Stress urinary incontinence [N39.3]    Comments:      April Rhodes DO   11/11/2024   11:44 AM             [1]   Outpatient Medications Marked as Taking for the 11/11/24 encounter (Office Visit) with [facility] PROCEDURE   Medication Sig Dispense Refill    estradiol (ESTRACE) 0.1 MG/GM Vaginal Cream Apply 0.5 gram vaginally 2 times per week. 42.5 g 3

## 2024-11-11 NOTE — PATIENT INSTRUCTIONS
[facility] - [facility] UROGYNECOLOGY  [address]  [facility] [address]  PH: [phone]  FAX: [phone]       Urodynamic Testing Discharge Instructions:    There are NO dietary or activity restrictions.  You may resume your normal schedule.      You may have mild discomfort for a few hours after your testing today.  There may be some mild burning when you urinate or you may see some blood in your urine.  These problems should not last more than 24 hours.  The following suggestions may minimize any symptoms you experience.    Drink 6-8 large glasses of water over the next 8 hours  A compress or sitz bath may be soothing  Tylenol or Ibuprofen may be taken as needed    If you experience any of the following, please call the office or, if after hours, the on-call physician at [phone].    Excessive pain  Bright red bloody discharge  Fever or chills  Continued urgency, frequency or burning with urination    Obtaining Test Results    Your urodynamic test will be interpreted by a specialist and available to the referring physician within 7-14 days.  Patients in our clinic are given an appointment to come back to discuss the results and any appropriate treatment recommendations.    Please do not hesitate to contact our office with any questions or concerns at [phone].    I acknowledge that I have received verbal and written discharge  instructions and that I understand these instructions clearly.    Patient Signature:    Date:    formerly [facility]   [facility]    BOWEL REGIMEN    Constipation can have detrimental effects on bladder function and can worsen the symptoms of prolapse.  It is important to avoid constipation.    The first step for treating constipation is to increase the amount of fiber in your diet.  It is usually difficult to get enough fiber each day in the food we eat.  Therefore, the best way to increase fiber is to take a daily  fiber supplement.      _____Fiber Supplement  (Metamucil, Citrucel, Benefiber, etc)    Begin with 1 dose (as instructed on the package) every morning.    If the stool is too hard, or if the stool consists of small, hard, marbles, you may need to increase to 1 dose each morning and 1 dose each evening.    If you find it difficult to take the fiber as directed (i.e. mixed with water or juice), you can mix the fiber with your cereal or with applesauce.    Don’t worry if you have to increase the amount---fiber is not addictive and will not cause diarrhea.      _____Milk of Magnesia    Begin with 1 teaspoon every evening.  This is a very low dose---approximately one-sixth of the typical dose.  You may need to increase the amount depending on your results.      _____Miralax    Miralax is a laxative available over the counter.  It can be used on a long-term basis if necessary.  The usual starting dose is 1 capful of powder mixed in fluid each morning.  The dose can be adjusted up or down depending on results and consistency of stool.[facility]    HAVING A URINARY CATHETER AFTER SURGERY    What is a urinary catheter?  A catheter is a soft tube used to drain urine from your bladder.    Why do I need a catheter?  A urinary catheter is used to help with healing immediately after surgery.  It works to keep your bladder empty while you are recovering.  Surgery, swelling and anesthesia can cause the bladder to lose its normal sensations for a while.  The catheter may stay in place for a week or more after you go home.    Where will I go to get the catheter removed?  Your catheter will be removed in the office.  Please call the office to schedule a voiding trial with the nurse.    How will the catheter be removed?  A nurse will disconnect your catheter from the drainage bag.  She will attach a syringe to the catheter and fill the bladder with sterile water until you have a strong desire to  urinate.  The catheter will be removed and you will be able to go to the bathroom to empty your bladder.    Will the catheter need to be replaced?  You will need to urinate a specific amount, depending on how much you were initially able to hold.  Your catheter will only need to be replaced if you are not able to empty the specific amount.  If the catheter is replaced, your nurse will discuss with you when you need to return.    What do I do after the catheter is removed?  For the first 24 hours, you should go to the bathroom with your first urge or every 2-3 hours while you are awake.  Urinate before you go to bed and if you wake up in the night, you do not need to set an alarm to wake up.  Drink plenty of water (6-8 glasses) slowly throughout the day.  Avoid liquids containing caffeine.  Continue with any pain medications (Motrin) as directed by the nurse.  Continue with your current bowel regime; avoid constipation.    What if I have trouble emptying my bladder?  If you are having trouble emptying your bladder, try the following tips:  Urinate normally then stand up, walk around for a minute or two, sit back down on the commode and attempt to urinate (double void).  Sit in a tub of warm water and try to urinate in the tub.  Run warm water over your vaginal area while attempting to urinate.    When should I call the office?  If you are unable to urinate for 6 hours.  You feel you need to urinate but cannot.  Urinating frequently in small amounts.  You experience abdominal bloating, pressure or back pain.  You have a fever over 100.5 for 4 hours or above 101.0 anytime.  You have nausea and/or vomiting.  Burning/pain with urination.    Please feel free to call the nurse at [phone] with any questions 8am-4:30pm Monday-Friday.    If the office is closed, you may call the on call physician at [phone] or go to the emergency room.[facility]     Post-Operative  Guidelines    AVOID CONSTIPATION - Take Miralax: one capful in water or juice each morning.  You can also take each evening if needed.  No lifting over 10 lbs. (1 gallon of milk is 8 lbs.)  It is okay to walk up and down stairs and to walk outside, but be careful not to become fatigued.  Showers and tub baths are okay, even if you have a catheter or abdominal incision.  You may ride in a car immediately.  You may drive after 1-2 weeks.    Please call us for any of the following:  Temperature above 100.5 for 4 hours or above 101.0 at any time  Chest pain or trouble breathing  Vaginal bleeding heavier than a period  Redness, tenderness or swelling of your legs  Pain or burning when you urinate  Redness, pain or foul discharge from incision

## 2024-11-13 ENCOUNTER — VIRTUAL PHONE E/M (OUTPATIENT)
Dept: UROLOGY | Facility: HOSPITAL | Age: 84
End: 2024-11-13
Attending: OBSTETRICS & GYNECOLOGY
Payer: MEDICARE

## 2024-11-13 ENCOUNTER — OFFICE VISIT (OUTPATIENT)
Dept: INTERNAL MEDICINE CLINIC | Facility: CLINIC | Age: 84
End: 2024-11-13
Payer: MEDICARE

## 2024-11-13 VITALS
SYSTOLIC BLOOD PRESSURE: 130 MMHG | DIASTOLIC BLOOD PRESSURE: 82 MMHG | HEART RATE: 86 BPM | HEIGHT: 64 IN | BODY MASS INDEX: 25.44 KG/M2 | RESPIRATION RATE: 18 BRPM | WEIGHT: 149 LBS | TEMPERATURE: 98 F

## 2024-11-13 DIAGNOSIS — I10 ESSENTIAL HYPERTENSION: Primary | ICD-10-CM

## 2024-11-13 DIAGNOSIS — N81.84 PELVIC MUSCLE WASTING: ICD-10-CM

## 2024-11-13 DIAGNOSIS — N39.3 FEMALE STRESS INCONTINENCE: ICD-10-CM

## 2024-11-13 DIAGNOSIS — N32.81 DETRUSOR INSTABILITY: Primary | ICD-10-CM

## 2024-11-13 DIAGNOSIS — N95.2 POSTMENOPAUSAL ATROPHIC VAGINITIS: ICD-10-CM

## 2024-11-13 DIAGNOSIS — R32 URINARY INCONTINENCE, UNSPECIFIED TYPE: ICD-10-CM

## 2024-11-13 DIAGNOSIS — N39.42 INCONTINENCE WITHOUT SENSORY AWARENESS: ICD-10-CM

## 2024-11-13 DIAGNOSIS — N39.41 URGE INCONTINENCE: ICD-10-CM

## 2024-11-13 PROCEDURE — G2211 COMPLEX E/M VISIT ADD ON: HCPCS | Performed by: INTERNAL MEDICINE

## 2024-11-13 PROCEDURE — 99213 OFFICE O/P EST LOW 20 MIN: CPT | Performed by: INTERNAL MEDICINE

## 2024-11-13 RX ORDER — TROSPIUM CHLORIDE ER 60 MG/1
60 CAPSULE ORAL DAILY
Qty: 90 CAPSULE | Refills: 3 | Status: SHIPPED | OUTPATIENT
Start: 2024-11-13 | End: 2024-11-13

## 2024-11-13 RX ORDER — TROSPIUM CHLORIDE ER 60 MG/1
60 CAPSULE ORAL DAILY
Qty: 90 CAPSULE | Refills: 3 | Status: SHIPPED | OUTPATIENT
Start: 2024-11-13

## 2024-11-13 NOTE — PROGRESS NOTES
Pt presents w/ initial c/o UI  Urodynamic testing undergone without complication.  Results reviewed with patient via telephone  This visit is being conducted as a televisit with pt's consent.  Pt in safe, private environment for televisit, provider located in the office setting    0/65cc & 225+/0  senior care 275cc  DO @ 97cc  ESTRADA @ 100mL, unreduced, in the absence of an uninhibited contraction     Discussed with patient mgmt options for DO/UUI, ESTRADA  Biggest bother is UUI (urinary leakage and feeling wet)  Went to PT, not reg with exercises  Using vag estrogen cream    Discussed dietary and behavioral modifications for mgmt of urinary symptoms  Discussed weight management and benefits of weight loss on urinary symptoms  Reviewed AUA/SUFU guidelines on mgmt of non-neurogenic OAB  Discussed pharmacologic and nonpharmacologic mgmt options of urinary symptoms - reviewed risks, benefits, alternatives, and goals of treatment  Discussed specific risks related to OAB meds including, but not limited to dry mouth, constipation, blurry vision, cognitive changes, and BP elevation.    Discussed management options for ETSRADA including expectant management, pelvic floor PT, pessary, and surgery  Discussed risks and benefits associated with each option  Discussed urodynamic testing & results    Thorough discussion of surgical risks, benefits, and alternatives including, but not limited to bleeding/clots, infection, injury to nearby organs (urethra, bladder, ureters, bowel, blood vessels), mesh erosion, dyspareunia, de adarsh UI, worsening UI, recurrence, voiding dysfunction, and pain.  Discussed pain mgmt and potential need for narcotics. Discussed addiction potential with narcotics. IL  reviewed.    Bowel management reviewed. Discussed using fiber daily w/ addition of miralax as needed    Discussed mgmt of vulvovaginal atrophy with vaginal estrogen cream. Reviewed associated benefits, risks, alternatives, and goals. Recommend low dose twice  weekly mgmt       All questions answered.  Pt will proceed bladder diet/drill, pelvic exercises  Info provided  Cont vag estrogen    Bowel mgmt    Trial trospium XR 60mg daily    Follow up med check     Dr. April Rhodes

## 2024-11-13 NOTE — PATIENT INSTRUCTIONS
[email or web address]://[email or web address]  [email or web address]://[email or web address]/assets/[email or web address]/[email or web address]_[email or web address]  [email or web address]://[email or web address]/assets/[email or web address]/Mid-urethral_Sling.pdf    [email or web address]://www.[email or web address]/assets/[email or web address]/OAB.pdf  https://www.[email or web address]/assets/2/6/Bladder_Training.pdf    https://www.[employer].org/assets/2/6/Botox.pdf  https://www.yourpelvicfloor.org/media/Percutaneous_Tibial_Nerve_Stimulation_RV1.pdf    https://www.yourpelvicfloor.org/media/Pelvic_Floor_Exercises_RV2-1.pdf  https://www.yourpelvicfloor.org/conditions/bladder-training/

## 2024-11-13 NOTE — PROGRESS NOTES
TC from pt stating Trospium is not affordable with insurance. GoodRx coupon provided to pt and pharmacy changed to [facility] cheapest GoodRx coupon. Encouraged to call with any further questions or concerns. Pt verbalized understanding.

## 2025-01-08 ENCOUNTER — OFFICE VISIT (OUTPATIENT)
Dept: UROLOGY | Facility: HOSPITAL | Age: 85
End: 2025-01-08
Attending: PHYSICIAN ASSISTANT
Payer: MEDICARE

## 2025-01-08 VITALS — HEIGHT: 64 IN | RESPIRATION RATE: 16 BRPM | WEIGHT: 149 LBS | BODY MASS INDEX: 25.44 KG/M2

## 2025-01-08 DIAGNOSIS — N39.3 FEMALE STRESS INCONTINENCE: ICD-10-CM

## 2025-01-08 DIAGNOSIS — N81.84 PELVIC MUSCLE WASTING: ICD-10-CM

## 2025-01-08 DIAGNOSIS — N95.2 POSTMENOPAUSAL ATROPHIC VAGINITIS: ICD-10-CM

## 2025-01-08 DIAGNOSIS — N39.41 URGE INCONTINENCE: ICD-10-CM

## 2025-01-08 DIAGNOSIS — N32.81 DETRUSOR INSTABILITY: Primary | ICD-10-CM

## 2025-01-08 LAB
CONTROL RUN WITHIN 24 HOURS?: YES
LEUKOCYTE ESTERASE URINE: NEGATIVE
NITRITE URINE: NEGATIVE

## 2025-01-08 PROCEDURE — 51701 INSERT BLADDER CATHETER: CPT | Performed by: PHYSICIAN ASSISTANT

## 2025-01-08 PROCEDURE — 81002 URINALYSIS NONAUTO W/O SCOPE: CPT | Performed by: PHYSICIAN ASSISTANT

## 2025-01-08 PROCEDURE — 99212 OFFICE O/P EST SF 10 MIN: CPT

## 2025-01-08 PROCEDURE — 87086 URINE CULTURE/COLONY COUNT: CPT | Performed by: PHYSICIAN ASSISTANT

## 2025-01-08 RX ORDER — MIRABEGRON 25 MG/1
25 TABLET, FILM COATED, EXTENDED RELEASE ORAL DAILY
Qty: 90 TABLET | Refills: 3 | Status: SHIPPED | OUTPATIENT
Start: 2025-01-08 | End: 2025-01-10

## 2025-01-08 NOTE — PROGRESS NOTES
Patient presents to follow up DO/UUI    Currently taking trospium ER 60 mg  Reports dry mouth & eyes, bothersome  Reports some improvement in frequency, no improvement in leaking    Using vag estrogen 2x weekly  Bowels constipated  Denies current UTI s/sx    Previously tried:  PFPT    UDS 11/11/24  0/65cc &225+/0  snf 275cc  DO @ 97cc  ESTRADA @ 100mL, unreduced, in the absence of an uninhibited contraction     Urogynecology Summary:  Urogynecology Summary  Prolapse: No  ESTRADA: Yes  Urge Incontinence: Yes  Nocturia Frequency: 2  Frequency: 1 - 2 hours  Incomplete emptying: No  Activities are limited by UI/POP?: No  Currently Sexually Active: No  Avoids sexual activity due to: Other    Vitals:  Resp 16   Ht 64\"   Wt 149 lb (67.6 kg)   BMI 25.58 kg/m²     GENERAL EXAM:  GENERAL: alert & oriented, NAD  HEENT: NC/AT, sclera without injection  SKIN: no rashes  LUNGS:  without increased respiratory effort  ABDOMEN: soft, non-tender, non-distended, no masses appreciated  EXT: no edema    PELVIC EXAM: RADHA Mensah, present for exam as a chaperone  Ext. Gen: +atrophy, no lesions  Urethra: +atrophy, nontender  Bladder: no fullness, nontender  Vagina: +atrophy  Cervix & Uterus: absent  Adnexa:no masses, nontender  Perineum: nontender  Anus: wnl  Rectum: defer     PELVIS FLOOR NEUROMUSCULAR FUNCTION:  Strength:  1, Unable to hold greater than 3 sec, and Increased tone  Perineal Sensation:  Normal    Pt voided 75 mL in the privacy of the bathroom  After obtaining verbal consent from the patient, sterile technique used to prep urethra and collect urine.   mL.  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 Urge incontinence  N39.41 POCT urinalysis dipstick[73587]     Urine Culture, Routine     Straight Cath PVR     Mirabegron ER 25 MG Oral Tablet 24 Hr      3.  Female stress incontinence  N39.3 POCT urinalysis dipstick[67617]     Urine Culture, Routine     Straight Cath PVR      4. Pelvic muscle wasting  N81.84       5. Postmenopausal atrophic vaginitis  N95.2           Discussion Items:   Bowel management reviewed. Discussed using fiber daily w/ addition of miralax as needed.    Discussed mgmt of vulvovaginal atrophy with vaginal estrogen cream. Reviewed associated benefits, risks, alternatives, and goals. Recommend low dose 2x/week treatment.    Discussed dietary and behavioral modifications for mgmt of urinary symptoms.  Discussed weight management and benefits of weight loss on urinary symptoms.  Reviewed AUA/SUFU guidelines on mgmt of non-neurogenic OAB.  Discussed pharmacologic and nonpharmacologic mgmt options of urinary symptoms - reviewed risks, benefits, alternatives, and goals of treatment.  Discussed specific risks related to OAB meds including, but not limited to dry mouth, constipation, blurry vision, cognitive changes, and BP elevation.      Treatment Plan:  Urine collected via straight cath & sent for Cx, follow result, tx if +  Stop trospium ER 60 mg  Start mirabegron 50 mg  Continue vag estrogen cream as prescribed  Bowel regimen  Bladder diet/drill  Pelvic floor exercises  Call with s/sx of UTI    All questions answered  She understands and agrees to plan    Return in about 6 weeks (around 2/19/2025) for UUI with repeat void assessment.    Mandi Kearney PA-C    Note to patient: The 21st Century Cures Act makes medical notes like these available to patients in the interest of transparency.  However, be advised this is a medical document.  It is intended as peer to peer communication.  It is written in medical language and may contain abbreviations or verbiage that are unfamiliar.  It may appear blunt or direct.  Medical documents are intended to carry relevant information, facts as evident, and the clinical opinion of the practitioner.

## 2025-01-10 ENCOUNTER — TELEPHONE (OUTPATIENT)
Dept: UROLOGY | Facility: HOSPITAL | Age: 85
End: 2025-01-10

## 2025-01-10 DIAGNOSIS — N39.41 URGE INCONTINENCE: Primary | ICD-10-CM

## 2025-01-10 RX ORDER — VIBEGRON 75 MG/1
1 TABLET, FILM COATED ORAL DAILY
Qty: 30 TABLET | Refills: 0 | Status: SHIPPED | OUTPATIENT
Start: 2025-01-10 | End: 2025-02-09

## 2025-01-10 NOTE — TELEPHONE ENCOUNTER
TC to patient to inform her of negative urine culture.  She reports that she is unable to afford myrbetriq prescription that she was given on 1/8/25.  Requesting a different medication.  Previously on trospium which caused dry mouth and eyes.  Reached out to provider, will follow.  Pt understands and agrees to plan.    Addendum:  TC to patient.  Discussed with KENRICK Alcantara Gemtesa 75 mg po daily for UUI.  Prescription sent to pt's preferred pharmacy.  Patient given information for gemtesa prescription assistance program.  Encouraged pt to call us back and let us know if she begins medication.  Pt verbalizes understanding and agrees to plan.    Addendum:  Received fax from [employer] asking to change gemtesa to a medication covered by pt's insurance plan.  Call to patient and she states, she did go ahead and  Myrbetriq prescription for $120 for 30 day supply, so she will not be taking Gemtesa.    Medications will be discussed at her next visit on 2/26/25.  Pt understands and agrees to plan.

## 2025-02-26 ENCOUNTER — OFFICE VISIT (OUTPATIENT)
Dept: UROLOGY | Facility: HOSPITAL | Age: 85
End: 2025-02-26
Attending: PHYSICIAN ASSISTANT
Payer: MEDICARE

## 2025-02-26 VITALS — BODY MASS INDEX: 25.44 KG/M2 | WEIGHT: 149 LBS | RESPIRATION RATE: 18 BRPM | HEIGHT: 64 IN

## 2025-02-26 DIAGNOSIS — N32.81 DETRUSOR INSTABILITY: Primary | ICD-10-CM

## 2025-02-26 DIAGNOSIS — N39.41 URGE INCONTINENCE: ICD-10-CM

## 2025-02-26 DIAGNOSIS — N81.84 PELVIC MUSCLE WASTING: ICD-10-CM

## 2025-02-26 DIAGNOSIS — N95.2 POSTMENOPAUSAL ATROPHIC VAGINITIS: ICD-10-CM

## 2025-02-26 DIAGNOSIS — N39.42 INCONTINENCE WITHOUT SENSORY AWARENESS: ICD-10-CM

## 2025-02-26 PROCEDURE — 99212 OFFICE O/P EST SF 10 MIN: CPT

## 2025-02-26 RX ORDER — VIBEGRON 75 MG/1
1 TABLET, FILM COATED ORAL DAILY
Qty: 30 TABLET | Refills: 11 | Status: SHIPPED | OUTPATIENT
Start: 2025-02-26

## 2025-02-26 NOTE — PROGRESS NOTES
Patient presents to follow up DO/UUI    Currently taking mirabegron 50 mg  Denies SEs  Reports very little improvement    Voiding q 2 hrs during day  Nocturia x0, wakes wet  Denies current UTI sx    Using vag estrogen 2x weekly  Bowels reg    Previously tried:  Trospium ER 60 mg (dry eyes & mouth)    UDS 11/11/24 IMPRESSION:   0/65cc &225+/0  long-term 275cc  DO @ 97cc  ESTRADA @ 100mL, unreduced, in the absence of an uninhibited contraction     Urogynecology Summary:  Urogynecology Summary  Prolapse: No  ESTRADA: Yes  Urge Incontinence: Yes (some improvement)  Nocturia Frequency: 0  Frequency: 2 hours  Incomplete emptying: No  Constipation: No (Benefiber qd)  Wears pad day?: 1 (1 depend and 1 always pad when she's home, 2 depends 1 poise pad and 1 always pad when she goes out and qhs)    Vitals:  Resp 18   Ht 64\"   Wt 149 lb (67.6 kg)   BMI 25.58 kg/m²     GENERAL EXAM:  GENERAL: alert & oriented, NAD  HEENT: NC/AT, sclera without injection  SKIN: no rashes  LUNGS:  without increased respiratory effort  ABDOMEN: soft, non-tender, non-distended, no masses appreciated  EXT: no edema    PELVIC EXAM: NOAH Seymour, present for exam as a chaperone  Ext. Gen: +atrophy, no lesions  Urethra: +atrophy, nontender  Bladder: no fullness, nontender  Vagina: +atrophy  Cervix & Uterus: absent  Adnexa:no masses, nontender  Perineum: nontender  Anus: wnl  Rectum: defer     PELVIS FLOOR NEUROMUSCULAR FUNCTION:  Strength:  1, Unable to hold greater than 3 sec, and Increased tone  Perineal Sensation:  Normal    Pt unable to void in the privacy of the bathroom  After obtaining verbal consent from the patient, sterile technique used to prep urethra and collect urine.  PVR 40 mL.    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 Urge incontinence  N39.41 Vibegron (GEMTESA) 75 MG Oral Tab      3. Pelvic muscle wasting  N81.84       4. Postmenopausal atrophic vaginitis  N95.2       5.  Incontinence without sensory awareness  N39.42           Discussion Items:   Discussed dietary and behavioral modifications for mgmt of urinary symptoms.  Discussed weight management and benefits of weight loss on urinary symptoms.  Reviewed AUA/SUFU guidelines on mgmt of non-neurogenic OAB.  Discussed pharmacologic and nonpharmacologic mgmt options of urinary symptoms - reviewed risks, benefits, alternatives, and goals of treatment.  Discussed specific risks related to OAB meds including, but not limited to dry mouth, constipation, blurry vision, cognitive changes, and BP elevation.    Discussed management options for OAB/UUI including risks/benefits of expectant management, vaginal estrogen cream, PFPT, OAB meds, PTNS, bladder Botox.    Treatment Plan:  Stop mirabegron 50 mg  Start Gemtesa 75 mg  -- if issues with cost/coverage at pharmacy discussed trying Gemtesa pt assistance program  -- if unable to fill Gemtesa pt may remain on mirabegron 50 mg if she wishes  Pt considering PTNS, will call to schedule if she wants  Continue vag estrogen cream as prescribed  Bowel regimen  Bladder diet/drill - handout provided  Pelvic floor exercises - handout provided  Call with s/sx of UTI    All questions answered  She understands and agrees to plan    Return in about 2 months (around 4/26/2025) for UUI.    Mandi Kearney PA-C    Note to patient: The 21st Century Cures Act makes medical notes like these available to patients in the interest of transparency.  However, be advised this is a medical document.  It is intended as peer to peer communication.  It is written in medical language and may contain abbreviations or verbiage that are unfamiliar.  It may appear blunt or direct.  Medical documents are intended to carry relevant information, facts as evident, and the clinical opinion of the practitioner.

## 2025-02-27 ENCOUNTER — TELEPHONE (OUTPATIENT)
Dept: UROLOGY | Facility: HOSPITAL | Age: 85
End: 2025-02-27

## 2025-02-27 DIAGNOSIS — N39.41 URGE INCONTINENCE: Primary | ICD-10-CM

## 2025-02-27 RX ORDER — VIBEGRON 75 MG/1
1 TABLET, FILM COATED ORAL DAILY
Qty: 30 TABLET | Refills: 0 | Status: SHIPPED | OUTPATIENT
Start: 2025-02-27 | End: 2025-03-29

## 2025-02-27 NOTE — TELEPHONE ENCOUNTER
Outgoing call to patients preferred pharmacy.   Spoke to pharmacy member regarding fax received regarding Gemtesa medication refill.   Gemtesa not covered by patients insurance.   High out of pocket cost.

## 2025-02-27 NOTE — TELEPHONE ENCOUNTER
Outgoing call to patient. Detailed voice mail provided on below   Call regarding prescribed Gemtesa medication not covered by patients insurance.   High out of pocket cost for Gemtesa prescription.    Gemtesa medication sent to [facility].   Gemtesa Support program phone number [phone] provided to patient.    Encouraged patient to call with any questions or concerns.   Patient verbalized understanding.

## 2025-02-28 ENCOUNTER — TELEPHONE (OUTPATIENT)
Dept: UROLOGY | Facility: HOSPITAL | Age: 85
End: 2025-02-28

## 2025-02-28 NOTE — TELEPHONE ENCOUNTER
Outgoing call to "[employer] at [phone].   Spoke to Kim and Suzette in regards to getting the [employer] prescription refill request for our patient.   [facility]/25 and confirmation received.   [facility] prescription refill is in process and pending.

## 2025-03-18 ENCOUNTER — TELEPHONE (OUTPATIENT)
Dept: UROLOGY | Facility: HOSPITAL | Age: 85
End: 2025-03-18

## 2025-03-18 DIAGNOSIS — N39.41 URGE INCONTINENCE: Primary | ICD-10-CM

## 2025-03-18 NOTE — TELEPHONE ENCOUNTER
Outgoing telephone call to patient @ [phone]. No answer.  Left message on her voicemail stating the following: Please contact our office @ [phone], in regards to re-scheduling your canceled 4/16/2025 appointment with Mandi Kearney, due to the provider being unavailable. 4/15 or 4/17 was offered instead. Office number provided.

## 2025-03-18 NOTE — TELEPHONE ENCOUNTER
Incoming fax for prior authorization for gemtesa received.  Forms completed and successfully faxed with supporting provider notes to number provided.  Documents scanned into [employer].  Will follow.    Addendum  TC to Michaelle after a fax received from [employer] stating Gemtesa has been approved.  Michaelle states the prescription will cost pt $482.00/ month.  TC to patient with this information and she states that is too expensive for her.  She just refilled mybetriq prescription and will proceed with this and discuss with PA at her next visit.

## 2025-03-19 ENCOUNTER — OFFICE VISIT (OUTPATIENT)
Dept: INTERNAL MEDICINE CLINIC | Facility: CLINIC | Age: 85
End: 2025-03-19
Payer: MEDICARE

## 2025-03-19 VITALS
TEMPERATURE: 98 F | BODY MASS INDEX: 25.95 KG/M2 | OXYGEN SATURATION: 98 % | HEART RATE: 98 BPM | WEIGHT: 152 LBS | SYSTOLIC BLOOD PRESSURE: 134 MMHG | DIASTOLIC BLOOD PRESSURE: 78 MMHG | HEIGHT: 64 IN | RESPIRATION RATE: 20 BRPM

## 2025-03-19 DIAGNOSIS — R32 URINARY INCONTINENCE, UNSPECIFIED TYPE: ICD-10-CM

## 2025-03-19 DIAGNOSIS — F43.9 STRESS: ICD-10-CM

## 2025-03-19 DIAGNOSIS — I10 ESSENTIAL HYPERTENSION: Primary | ICD-10-CM

## 2025-03-19 PROCEDURE — G2211 COMPLEX E/M VISIT ADD ON: HCPCS | Performed by: INTERNAL MEDICINE

## 2025-03-19 PROCEDURE — 99213 OFFICE O/P EST LOW 20 MIN: CPT | Performed by: INTERNAL MEDICINE

## 2025-03-19 RX ORDER — MIRABEGRON 25 MG/1
25 TABLET, FILM COATED, EXTENDED RELEASE ORAL DAILY
COMMUNITY
Start: 2025-02-05

## 2025-03-19 NOTE — PROGRESS NOTES
HPI:    Patient ID: Jeanette Frausto is a 84 year old female.    HPI    Patient returns to the office today to discuss chronic medical issues as listed on the active problem list below.  Patient last seen in the office by me on  of last year.  Blood pressure was borderline.  During the last visit, the following changes were made: None.  She is currently on no medications for the blood pressure.  Since the last visit, the patient has seen the following doctors: urogyne for urinary issues.     Today, the patient offers the following complaints: Pt no longer has a job. She was at [employer] for a few years. There was a lot of restructuring. It has been stressful. Also, her dog .   The mirabegron has not been that helpful. Also rx'd with Gemtesa. She is working on other meds but cost is an issue.   Patient does check blood pressure at home. She brings in the meter- readings are mostly under 130/80.  Tobacco and alcohol use reviewed.   Current medications reviewed.   Health maintenance issues reviewed.    Wt Readings from Last 6 Encounters:   25 149 lb (67.6 kg)   25 149 lb (67.6 kg)   24 149 lb (67.6 kg)   24 148 lb (67.1 kg)   24 148 lb (67.1 kg)   24 148 lb (67.1 kg)       Patient Active Problem List   Diagnosis    Presbyopia    Urinary incontinence    Essential hypertension    Osteopenia    Facet syndrome, lumbar    Primary osteoarthritis of right hip    Right groin pain    Vitreous floaters of both eyes    Orthopedic aftercare    Pseudophakia of both eyes        HISTORY:  Past Medical History:    Anxiety state    Cataract    Fibroids    High blood pressure    Incontinence    bladder    Osteoarthritis    PONV (postoperative nausea and vomiting)    Trigger index finger of right hand    cortisone injection, phys therapy    UTI (urinary tract infection)    medication    Visual impairment    readers      Past Surgical History:   Procedure Laterality Date     Appendectomy  01/01/1994    Cataract extraction extracapsular w/ intraocular lens implantation Left 09/05/2023    done by Dr. Carrizales    Cataract extraction w/  intraocular lens implant Right 08/29/2023    Dr. Carrizales @ [facility]    Hip replacement surgery Right 2022    Hysterectomy  01/01/1994    [facility]    Needle biopsy right Right 10/14/2024     bx    Oophorectomy  01/01/1994    [facility]      Family History   Problem Relation Age of Onset    Diabetes Father     Heart Disorder Father     Breast Cancer Paternal Cousin Female 47    Heart Disorder Mother     Hypertension Mother     Glaucoma Neg     Ovarian Cancer Neg     Macular degeneration Neg       Social History     Socioeconomic History    Marital status: Single   Tobacco Use    Smoking status: Never    Smokeless tobacco: Never   Vaping Use    Vaping status: Never Used   Substance and Sexual Activity    Alcohol use: No    Drug use: No    Sexual activity: Not Currently     Partners: Male   Other Topics Concern    Caffeine Concern Yes     Comment: coffee, 2 cups/day    Right Handed Yes          Review of Systems          Current Outpatient Medications   Medication Sig Dispense Refill    Vibegron (GEMTESA) 75 MG Oral Tab Take 1 tablet by mouth daily. 30 tablet 0    Vibegron (GEMTESA) 75 MG Oral Tab Take 1 tablet by mouth daily. 30 tablet 11    estradiol (ESTRACE) 0.1 MG/GM Vaginal Cream Apply 0.5 gram vaginally 2 times per week. 42.5 g 3    triamcinolone 0.1 % External Cream Apply topically 2 (two) times daily as needed. 45 g 3    Betamethasone Dipropionate Aug 0.05 % External Ointment APPLY TO AFFECTED AREA TWICE A DAY IF NEEDED 45 g 1    Calcium Carbonate Antacid 600 MG Oral Chew Tab Chew 600 mg by mouth 2 (two) times daily.      Cholecalciferol (VITAMIN D3) 400 units Oral Tab Take by mouth.      loratadine 10 MG Oral Tab Take 1 tablet (10 mg total) by mouth daily as needed for Allergies. (Patient not taking: Reported on 1/8/2025)       Allergies:Allergies[1]     PHYSICAL EXAM:    There were no vitals taken for this visit.     Physical Exam  Constitutional:       Appearance: Normal appearance.   Cardiovascular:      Rate and Rhythm: Normal rate and regular rhythm.      Pulses: Normal pulses.      Heart sounds: Normal heart sounds.   Pulmonary:      Effort: Pulmonary effort is normal.      Breath sounds: Normal breath sounds.   Neurological:      Mental Status: She is alert.   Psychiatric:         Mood and Affect: Mood normal.         Behavior: Behavior normal.         Thought Content: Thought content normal.                 ASSESSMENT/PLAN:   1. Essential hypertension  Blood pressure is well-controlled with healthy lifestyle.  No need for any new medications at this time.  Keep working on diet and exercise.  Follow-up in August for Medicare annual wellness visit.    2. Urinary incontinence, unspecified type  Patient is been working with urogynecology.  Trying different medications with various levels of success.  Continue working with them.    3. Stress  Lost her job recently.  She is actively looking for one now.  This causes her some level of stress.  Appears to be handling it reasonably well.  No need for medications or therapy.        Meds This Visit:  Requested Prescriptions      No prescriptions requested or ordered in this encounter       Imaging & Referrals:  None         Jeffrey Quiroz MD          [1]   Allergies  Allergen Reactions    Sulfamethoxazole SWELLING     Other reaction(s): lip and tongue swelling    Trimethoprim SWELLING     Other reaction(s): lip and tongue swelling    Erythromycin OTHER (SEE COMMENTS)     Other reaction(s): Shakiness    Erythromycin Base [Erythromycin Stearate] OTHER (SEE COMMENTS)    Other OTHER (SEE COMMENTS)    Ciprofloxacin RASH

## 2025-03-20 RX ORDER — VIBEGRON 75 MG/1
1 TABLET, FILM COATED ORAL DAILY
Qty: 30 TABLET | Refills: 3 | Status: SHIPPED | OUTPATIENT
Start: 2025-03-20 | End: 2025-06-18

## 2025-03-24 ENCOUNTER — TELEPHONE (OUTPATIENT)
Dept: UROLOGY | Facility: HOSPITAL | Age: 85
End: 2025-03-24

## 2025-03-24 NOTE — TELEPHONE ENCOUNTER
Incoming call from Darline  Pharmacist inquirying about new RX for Gemtesa.  Pharmacist informed us that patient just picked up Rx for Myrbetriq and they just recd a rx for Gemtesa.  Pharmacist informed that patient is refusing Gemtesa due to cost $492./30 day and will continue with Myrbetriq..  Pharmacist states he will close out Rx for Gemtesa.

## 2025-04-10 ENCOUNTER — TELEPHONE (OUTPATIENT)
Dept: UROLOGY | Facility: HOSPITAL | Age: 85
End: 2025-04-10

## 2025-04-10 NOTE — TELEPHONE ENCOUNTER
Received fax from "[employer] [facility] form  Contacted patient and confirmed that she is currently taking Miragebron 25 mg  Has never taken Gemtesa 75 mg as it was too expensive  Has tried Trospium in the past that caused dry mouth  Denies side effects from Mirabegron currently and states it is helping her urinary urgency, she can go up to 2 hours without having to void  Answered questions regarding bladder stimulants as pt does have a cup of coffee in the morning and usually a cup of iced tea for lunch and dinner

## 2025-04-17 ENCOUNTER — OFFICE VISIT (OUTPATIENT)
Dept: UROLOGY | Facility: HOSPITAL | Age: 85
End: 2025-04-17
Attending: PHYSICIAN ASSISTANT
Payer: MEDICARE

## 2025-04-17 VITALS — RESPIRATION RATE: 16 BRPM | WEIGHT: 152 LBS | HEIGHT: 64 IN | BODY MASS INDEX: 25.95 KG/M2

## 2025-04-17 DIAGNOSIS — R35.1 NOCTURIA: ICD-10-CM

## 2025-04-17 DIAGNOSIS — N95.2 POSTMENOPAUSAL ATROPHIC VAGINITIS: ICD-10-CM

## 2025-04-17 DIAGNOSIS — N32.81 DETRUSOR INSTABILITY: Primary | ICD-10-CM

## 2025-04-17 DIAGNOSIS — N39.41 URGE INCONTINENCE: ICD-10-CM

## 2025-04-17 DIAGNOSIS — N81.84 PELVIC MUSCLE WASTING: ICD-10-CM

## 2025-04-17 LAB
BLOOD URINE: NEGATIVE
CONTROL RUN WITHIN 24 HOURS?: YES
LEUKOCYTE ESTERASE URINE: NEGATIVE
NITRITE URINE: NEGATIVE

## 2025-04-17 PROCEDURE — 81002 URINALYSIS NONAUTO W/O SCOPE: CPT | Performed by: PHYSICIAN ASSISTANT

## 2025-04-17 PROCEDURE — 99212 OFFICE O/P EST SF 10 MIN: CPT

## 2025-04-17 PROCEDURE — 51701 INSERT BLADDER CATHETER: CPT

## 2025-04-17 RX ORDER — MIRABEGRON 50 MG/1
50 TABLET, FILM COATED, EXTENDED RELEASE ORAL DAILY
Qty: 90 TABLET | Refills: 3 | Status: SHIPPED | OUTPATIENT
Start: 2025-04-17

## 2025-04-17 NOTE — PROGRESS NOTES
Patient presents to follow up DO/UUI    Currently taking mirabegron 25 mg  Denies SEs  Reports some improvement in urgency and frequency, minimal change in leaking    Using vag estrogen 2x weekly  Bowels reg    Previously tried:  Trospium ER 60 mg (dry eyes & mouth)     UDS 11/11/24 IMPRESSION:   0/65cc &225+/0  assisted 275cc  DO @ 97cc  ESTRADA @ 100mL, unreduced, in the absence of an uninhibited contraction     Urogynecology Summary:  Urogynecology Summary  Prolapse: No  ESTRADA: Yes  Urge Incontinence: Yes  Nocturia Frequency: 3  Frequency: 1 - 2 hours  Incomplete emptying: No  Constipation: No  Wears pad day?: 5 (using depends and large pads)  Wears Pad Night?: 2 (depends, plus overnight pads.  Uses chux pad for leakage.)  Activities are limited by UI/POP?: No  Currently Sexually Active: No  Avoids sexual activity due to: Other    Vitals:  Resp 16   Ht 64\"   Wt 152 lb (68.9 kg)   BMI 26.09 kg/m²     GENERAL EXAM:  GENERAL: alert & oriented, NAD  HEENT: NC/AT, sclera without injection  SKIN: no rashes  LUNGS:  without increased respiratory effort  ABDOMEN: soft, non-tender, non-distended, no masses appreciated  EXT: no edema    PELVIC EXAM: RADHA Younger, present for exam as a chaperone  Ext. Gen: +atrophy, no lesions  Urethra: +atrophy, nontender  Bladder: no fullness, nontender  Vagina: +atrophy  Cervix & Uterus: absent  Adnexa:no masses, nontender  Perineum: nontender  Anus: wnl  Rectum: defer     PELVIS FLOOR NEUROMUSCULAR FUNCTION:  Strength:  1, Unable to hold greater than 3 sec, and Increased tone  Perineal Sensation:  Normal    Pt voided 80 mL in the privacy of the bathroom  After obtaining verbal consent from the patient, sterile technique used to prep urethra and collect urine.   mL.    Impression/Plan:    ICD-10-CM    1. 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 Urge incontinence  N39.41 Mirabegron ER (MYRBETRIQ) 50 MG Oral Tablet 24 Hr      3. Pelvic muscle  wasting  N81.84       4. Postmenopausal atrophic vaginitis  N95.2       5. Nocturia  R35.1 Mirabegron ER (MYRBETRIQ) 50 MG Oral Tablet 24 Hr          Discussion Items:   Discussed mgmt of vulvovaginal atrophy with vaginal estrogen cream. Reviewed associated benefits, risks, alternatives, and goals. Recommend low dose 2x/week treatment.    Discussed management options for OAB/UUI including risks/benefits of expectant management, vaginal estrogen cream, PFPT, OAB meds, PTNS.    Discussed dietary and behavioral modifications for mgmt of urinary symptoms.  Discussed weight management and benefits of weight loss on urinary symptoms.  Reviewed AUA/SUFU guidelines on mgmt of non-neurogenic OAB.  Discussed pharmacologic and nonpharmacologic mgmt options of urinary symptoms - reviewed risks, benefits, alternatives, and goals of treatment.  Discussed specific risks related to OAB meds including, but not limited to dry mouth, constipation, blurry vision, cognitive changes, and BP elevation.    Discussed PFPT to improve voiding fxn.  Pt reports unable to attend at this time.    Treatment Plan:  Increase mirabegron to 50 mg  Continue vag estrogen cream as prescribed  Bowel regimen  Bladder diet/drill  Pelvic floor exercises  Call with s/sx of UTI    All questions answered  She understands and agrees to plan    Return in about 6 weeks (around 5/29/2025) for UUI (phone).    Mandi Kearney PA-C    Note to patient: The 21st Century Cures Act makes medical notes like these available to patients in the interest of transparency.  However, be advised this is a medical document.  It is intended as peer to peer communication.  It is written in medical language and may contain abbreviations or verbiage that are unfamiliar.  It may appear blunt or direct.  Medical documents are intended to carry relevant information, facts as evident, and the clinical opinion of the practitioner.

## 2025-05-28 ENCOUNTER — VIRTUAL PHONE E/M (OUTPATIENT)
Dept: UROLOGY | Facility: HOSPITAL | Age: 85
End: 2025-05-28
Attending: PHYSICIAN ASSISTANT
Payer: MEDICARE

## 2025-05-28 DIAGNOSIS — N95.2 POSTMENOPAUSAL ATROPHIC VAGINITIS: ICD-10-CM

## 2025-05-28 DIAGNOSIS — N39.41 URGE INCONTINENCE: ICD-10-CM

## 2025-05-28 DIAGNOSIS — R35.1 NOCTURIA: ICD-10-CM

## 2025-05-28 DIAGNOSIS — N81.84 PELVIC MUSCLE WASTING: ICD-10-CM

## 2025-05-28 DIAGNOSIS — N32.81 DETRUSOR INSTABILITY: Primary | ICD-10-CM

## 2025-05-28 NOTE — PROGRESS NOTES
This visit is being conducted as a phone (audio only) visit with patient's consent.  Patient declined video visit due to technical capacity.  Patient is in a safe, private environment for televisit, provider located in the office setting.    Visit for f/u of DO/UUI    She is currently taking mirabegron 50 mg  Denies SEs  Reports +improvement in frequency, urgency    Using vag estrogen 2x weekly    Multiple BMs daily     Denies any current signs or symptoms of UTI    Previously tried:  Trospium ER 60 mg (dry eyes & mouth)     UDS 11/11/24 IMPRESSION:   0/65cc &225+/0  group home 275cc  DO @ 97cc  ESTRADA @ 100mL, unreduced, in the absence of an uninhibited contraction     Impression/Plan:    ICD-10-CM    1. 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 Urge incontinence  N39.41       3. Pelvic muscle wasting  N81.84       4. Postmenopausal atrophic vaginitis  N95.2       5. Nocturia  R35.1           Discussion Items:   Bowel management reviewed. Discussed using fiber daily w/ addition of miralax as needed.    Discussed mgmt of vulvovaginal atrophy with vaginal estrogen cream. Reviewed associated benefits, risks, alternatives, and goals. Recommend low dose 2x/week treatment.    Discussed dietary and behavioral modifications for mgmt of urinary symptoms.  Discussed weight management and benefits of weight loss on urinary symptoms.  Reviewed AUA/SUFU guidelines on mgmt of non-neurogenic OAB.  Discussed pharmacologic and nonpharmacologic mgmt options of urinary symptoms - reviewed risks, benefits, alternatives, and goals of treatment.  Discussed specific risks related to OAB meds including, but not limited to dry mouth, constipation, blurry vision, cognitive changes, and BP elevation.    Treatment Plan:  Continue mirabegron 50 mg  Continue vag estrogen as prescribed  Bowel regimen  Bladder diet/drill  Pelvic floor exercises  Call with s/sx of UTI    All questions answered  She understands and agrees to plan    Return in about 11 months  (around 4/28/2026) for UUI med f/u and yearly exam (sooner PRN).    Mandi Kearney PA-C    A total of 10 minutes were spent in discussion with the patient and/or family on this encounter in addition to time spent on chart review and documentation.    Note to patient: The 21st Century Cures Act makes medical notes like these available to patients in the interest of transparency.  However, be advised this is a medical document.  It is intended as peer to peer communication.  It is written in medical language and may contain abbreviations or verbiage that are unfamiliar.  It may appear blunt or direct.  Medical documents are intended to carry relevant information, facts as evident, and the clinical opinion of the practitioner.

## 2025-07-21 ENCOUNTER — TELEPHONE (OUTPATIENT)
Dept: UROLOGY | Facility: HOSPITAL | Age: 85
End: 2025-07-21

## 2025-07-21 NOTE — TELEPHONE ENCOUNTER
Incoming call from Jeanette with questions about Myrbetriq.  Pt reporting that she has dry itchy skin on her lower extremities and was inquiring if this is be cause by Myrbetriq.  Discussed with patient that this is not a side effect of Myrbetriq. All patient questions answered.  Pt verbalized understanding.

## 2025-08-20 ENCOUNTER — LAB ENCOUNTER (OUTPATIENT)
Dept: LAB | Age: 85
End: 2025-08-20
Attending: INTERNAL MEDICINE

## 2025-08-20 ENCOUNTER — OFFICE VISIT (OUTPATIENT)
Dept: INTERNAL MEDICINE CLINIC | Facility: CLINIC | Age: 85
End: 2025-08-20

## 2025-08-20 VITALS
DIASTOLIC BLOOD PRESSURE: 86 MMHG | HEART RATE: 92 BPM | OXYGEN SATURATION: 98 % | HEIGHT: 64 IN | SYSTOLIC BLOOD PRESSURE: 130 MMHG | WEIGHT: 148 LBS | RESPIRATION RATE: 18 BRPM | TEMPERATURE: 98 F | BODY MASS INDEX: 25.27 KG/M2

## 2025-08-20 DIAGNOSIS — R32 URINARY INCONTINENCE, UNSPECIFIED TYPE: ICD-10-CM

## 2025-08-20 DIAGNOSIS — Z12.31 ENCOUNTER FOR SCREENING MAMMOGRAM FOR BREAST CANCER: ICD-10-CM

## 2025-08-20 DIAGNOSIS — M85.80 OSTEOPENIA, UNSPECIFIED LOCATION: ICD-10-CM

## 2025-08-20 DIAGNOSIS — I10 ESSENTIAL HYPERTENSION: ICD-10-CM

## 2025-08-20 DIAGNOSIS — I87.2 VENOUS STASIS DERMATITIS OF BOTH LOWER EXTREMITIES: ICD-10-CM

## 2025-08-20 DIAGNOSIS — Z00.00 ENCOUNTER FOR ANNUAL HEALTH EXAMINATION: Primary | ICD-10-CM

## 2025-08-20 LAB
ALBUMIN SERPL-MCNC: 4.9 G/DL (ref 3.2–4.8)
ALBUMIN/GLOB SERPL: 2.1 (ref 1–2)
ALP LIVER SERPL-CCNC: 116 U/L (ref 55–142)
ALT SERPL-CCNC: 16 U/L (ref 10–49)
ANION GAP SERPL CALC-SCNC: 8 MMOL/L (ref 0–18)
AST SERPL-CCNC: 21 U/L (ref ?–34)
BASOPHILS # BLD AUTO: 0.04 X10(3) UL (ref 0–0.2)
BASOPHILS NFR BLD AUTO: 0.5 %
BILIRUB SERPL-MCNC: 0.9 MG/DL (ref 0.2–1.1)
BUN BLD-MCNC: 25 MG/DL (ref 9–23)
BUN/CREAT SERPL: 21.4 (ref 10–20)
CALCIUM BLD-MCNC: 10.2 MG/DL (ref 8.7–10.4)
CHLORIDE SERPL-SCNC: 106 MMOL/L (ref 98–112)
CHOLEST SERPL-MCNC: 183 MG/DL (ref ?–200)
CO2 SERPL-SCNC: 26 MMOL/L (ref 21–32)
CREAT BLD-MCNC: 1.17 MG/DL (ref 0.55–1.02)
DEPRECATED RDW RBC AUTO: 48.4 FL (ref 35.1–46.3)
EGFRCR SERPLBLD CKD-EPI 2021: 46 ML/MIN/1.73M2 (ref 60–?)
EOSINOPHIL # BLD AUTO: 1.37 X10(3) UL (ref 0–0.7)
EOSINOPHIL NFR BLD AUTO: 17 %
ERYTHROCYTE [DISTWIDTH] IN BLOOD BY AUTOMATED COUNT: 13.2 % (ref 11–15)
FASTING PATIENT LIPID ANSWER: YES
FASTING STATUS PATIENT QL REPORTED: YES
GLOBULIN PLAS-MCNC: 2.3 G/DL (ref 2–3.5)
GLUCOSE BLD-MCNC: 105 MG/DL (ref 70–99)
HCT VFR BLD AUTO: 39.5 % (ref 35–48)
HDLC SERPL-MCNC: 52 MG/DL (ref 40–59)
HGB BLD-MCNC: 13 G/DL (ref 12–16)
IMM GRANULOCYTES # BLD AUTO: 0.02 X10(3) UL (ref 0–1)
IMM GRANULOCYTES NFR BLD: 0.2 %
LDLC SERPL CALC-MCNC: 114 MG/DL (ref ?–100)
LYMPHOCYTES # BLD AUTO: 1.52 X10(3) UL (ref 1–4)
LYMPHOCYTES NFR BLD AUTO: 18.9 %
MCH RBC QN AUTO: 32.7 PG (ref 26–34)
MCHC RBC AUTO-ENTMCNC: 32.9 G/DL (ref 31–37)
MCV RBC AUTO: 99.2 FL (ref 80–100)
MONOCYTES # BLD AUTO: 0.54 X10(3) UL (ref 0.1–1)
MONOCYTES NFR BLD AUTO: 6.7 %
NEUTROPHILS # BLD AUTO: 4.56 X10 (3) UL (ref 1.5–7.7)
NEUTROPHILS # BLD AUTO: 4.56 X10(3) UL (ref 1.5–7.7)
NEUTROPHILS NFR BLD AUTO: 56.7 %
NONHDLC SERPL-MCNC: 131 MG/DL (ref ?–130)
OSMOLALITY SERPL CALC.SUM OF ELEC: 295 MOSM/KG (ref 275–295)
PLATELET # BLD AUTO: 228 10(3)UL (ref 150–450)
POTASSIUM SERPL-SCNC: 4.8 MMOL/L (ref 3.5–5.1)
PROT SERPL-MCNC: 7.2 G/DL (ref 5.7–8.2)
RBC # BLD AUTO: 3.98 X10(6)UL (ref 3.8–5.3)
SODIUM SERPL-SCNC: 140 MMOL/L (ref 136–145)
TRIGL SERPL-MCNC: 95 MG/DL (ref 30–149)
TSI SER-ACNC: 1.58 UIU/ML (ref 0.55–4.78)
VIT B12 SERPL-MCNC: 562 PG/ML (ref 211–911)
VLDLC SERPL CALC-MCNC: 16 MG/DL (ref 0–30)
WBC # BLD AUTO: 8.1 X10(3) UL (ref 4–11)

## 2025-08-20 PROCEDURE — 85060 BLOOD SMEAR INTERPRETATION: CPT

## 2025-08-20 PROCEDURE — 36415 COLL VENOUS BLD VENIPUNCTURE: CPT

## 2025-08-20 PROCEDURE — 80053 COMPREHEN METABOLIC PANEL: CPT

## 2025-08-20 PROCEDURE — 82607 VITAMIN B-12: CPT

## 2025-08-20 PROCEDURE — 84443 ASSAY THYROID STIM HORMONE: CPT

## 2025-08-20 PROCEDURE — 85025 COMPLETE CBC W/AUTO DIFF WBC: CPT

## 2025-08-20 PROCEDURE — 80061 LIPID PANEL: CPT

## 2025-08-20 PROCEDURE — G0439 PPPS, SUBSEQ VISIT: HCPCS | Performed by: INTERNAL MEDICINE

## 2025-08-20 PROCEDURE — 99213 OFFICE O/P EST LOW 20 MIN: CPT | Performed by: INTERNAL MEDICINE

## (undated) DEVICE — GAMMEX® PI HYBRID SIZE 9, STERILE POWDER-FREE SURGICAL GLOVE, POLYISOPRENE AND NEOPRENE BLEND: Brand: GAMMEX

## (undated) DEVICE — SUTURE VICRYL 2-0 FS-1

## (undated) DEVICE — APPLICATOR CHLORAPREP 26ML

## (undated) DEVICE — TRAY SURESTEP 16 BARDEX DRAIN

## (undated) DEVICE — GAMMEX® NON-LATEX PI ORTHO SIZE 8.5, STERILE POLYISOPRENE POWDER-FREE SURGICAL GLOVE: Brand: GAMMEX

## (undated) DEVICE — GAMMEX® NON-LATEX PI ORTHO SIZE 9, STERILE POLYISOPRENE POWDER-FREE SURGICAL GLOVE: Brand: GAMMEX

## (undated) DEVICE — 3M™ STERI-STRIP™ REINFORCED ADHESIVE SKIN CLOSURES, R1547, 1/2 IN X 4 IN (12 MM X 100 MM), 6 STRIPS/ENVELOPE: Brand: 3M™ STERI-STRIP™

## (undated) DEVICE — BIPOLAR SEALER 23-112-1 AQM 6.0: Brand: AQUAMANTYS™

## (undated) DEVICE — 2DE14 2-0 PDO 24 X 24: Brand: 2DE14 2-0 PDO 24 X 24

## (undated) DEVICE — ILLU EXAM VERSALIGHT 30D 13IN

## (undated) DEVICE — PROXIMATE SKIN STAPLERS (35 WIDE) CONTAINS 35 STAINLESS STEEL STAPLES (FIXED HEAD): Brand: PROXIMATE

## (undated) DEVICE — Device: Brand: STABLECUT®

## (undated) DEVICE — SOLUTION  .9 1000ML BTL

## (undated) DEVICE — GAMMEX® PI HYBRID SIZE 8.5, STERILE POWDER-FREE SURGICAL GLOVE, POLYISOPRENE AND NEOPRENE BLEND: Brand: GAMMEX

## (undated) DEVICE — HANDPIECE SET WITH HIGH FLOW TIP AND SUCTION TUBE: Brand: INTERPULSE

## (undated) DEVICE — HOOD: Brand: FLYTE

## (undated) DEVICE — SOLUTION  .9 3000ML

## (undated) DEVICE — 450 ML BOTTLE OF 0.05% CHLORHEXIDINE GLUCONATE IN 99.95% STERILE WATER FOR IRRIGATION, USP AND APPLICATOR.: Brand: IRRISEPT ANTIMICROBIAL WOUND LAVAGE

## (undated) DEVICE — GAUZE SPONGES,12 PLY: Brand: CURITY

## (undated) DEVICE — ANTERIOR HIP: Brand: MEDLINE INDUSTRIES, INC.

## (undated) DEVICE — FEMORAL CANAL BRUSH, IRRIGATION/SUCTION

## (undated) DEVICE — PREVENA PEEL & PLACE SYSTEM KIT- 13 CM: Brand: PREVENA™ PEEL & PLACE™

## (undated) DEVICE — SUCTION CANISTER, 3000CC,SAFELINER: Brand: DEROYAL

## (undated) DEVICE — VIOLET BRAIDED (POLYGLACTIN 910), SYNTHETIC ABSORBABLE SUTURE: Brand: COATED VICRYL

## (undated) DEVICE — 2T11 #2 PDO 36 X 36: Brand: 2T11 #2 PDO 36 X 36

## (undated) DEVICE — SOL  .9 1000ML BAG

## (undated) NOTE — ED AVS SNAPSHOT
Mauro Bermudez   MRN: [id number]    Department:  [facility] Emergency Department   Date of Visit:  1/5/2018           Disclosure     Insurance plans vary and the physician(s) referred by the ER may not be covered by your plan.  Please contact your within the next three months to obtain basic health screening including reassessment of your blood pressure.     IF THERE IS ANY CHANGE OR WORSENING OF YOUR CONDITION, CALL YOUR PRIMARY CARE PHYSICIAN AT ONCE OR RETURN IMMEDIATELY TO THE EMERGENCY DEPARTMEN

## (undated) NOTE — Clinical Note
Alen - I saw Jeanette today with mixed UI. I've recommended bladder testing. I will work to manage her urinary sx. I appreciate the opportunity to participate in her care. Thanks, April

## (undated) NOTE — LETTER
AUTHORIZATION FOR SURGICAL OPERATION OR OTHER PROCEDURE    1. I hereby authorize Dr. Avi Calvert and the [facility] Office staff assigned to my case to perform the following operation and/or procedure at the [facility] Office:    Right hip CSI    2.   My physician has explaine that prior to the time of the procedure, I have explained to the patient and/or his/her guardian, the risks and benefits involved in the proposed treatment and any reasonable alternative to the proposed treatment.   I have also explained the risks and benef

## (undated) NOTE — LETTER
21      Patient: Karolina Glynn  :  Visit date: 2021    Dear [facility],      I examined your patient in follow-up today. She is 5 weeks post left small finger middle phalangeal fracture.   She was treated with a splint an

## (undated) NOTE — LETTER
May 1, 2018     [place] [id number]      Dear [facility]:    Below are the results from your recent visit:    Resulted Orders   COMP METABOLIC PANEL (14)   Result Value Ref Range    Glucose 92 70 - 99 mg/dL    Sodium 143 136 - The blood cell count is normal. There is no evidence of anemia or infection. The blood sugar (glucose) level is normal. There is no evidence of diabetes.     The electrolytes levels in the blood are normal.    The kidney and liver function tests are all

## (undated) NOTE — LETTER
April 25, 2019     [place] [address]      Dear Evangelina Pulido:    Below are the results from your recent visit:    Resulted Orders   COMP METABOLIC PANEL (14)   Result Value Ref Range    Glucose 82 70 - 99 mg/dL    Sodium 142 13 Eosinophil Absolute 0.28 0.00 - 0.70 x10(3) uL    Basophil Absolute 0.04 0.00 - 0.20 x10(3) uL    Immature Granulocyte Absolute 0.02 0.00 - 1.00 x10(3) uL    Neutrophil % 58.0 %    Lymphocyte % 28.8 %    Monocyte % 8.8 %    Eosinophil % 3.6 %    Basophil

## (undated) NOTE — ED AVS SNAPSHOT
Zohreh Ambrose   MRN: [id number]    Department:  [facility] Emergency Department   Date of Visit:  3/15/2018           Disclosure     Insurance plans vary and the physician(s) referred by the ER may not be covered by your plan.  Please contact you within the next three months to obtain basic health screening including reassessment of your blood pressure.     IF THERE IS ANY CHANGE OR WORSENING OF YOUR CONDITION, CALL YOUR PRIMARY CARE PHYSICIAN AT ONCE OR RETURN IMMEDIATELY TO THE EMERGENCY DEPARTMEN

## (undated) NOTE — ED AVS SNAPSHOT
Daniel Rojas   MRN: [id number]    Department:  [facility] Emergency Department   Date of Visit:  5/11/2018           Disclosure     Insurance plans vary and the physician(s) referred by the ER may not be covered by your plan.  Please contact you within the next three months to obtain basic health screening including reassessment of your blood pressure.     IF THERE IS ANY CHANGE OR WORSENING OF YOUR CONDITION, CALL YOUR PRIMARY CARE PHYSICIAN AT ONCE OR RETURN IMMEDIATELY TO THE EMERGENCY DEPARTMEN

## (undated) NOTE — LETTER
5/10/2019              Oni3 She Rosario         Dear Kelly Cain,    It was a pleasure seeing you recently in the office. I am now reporting on your recent labs.     Your urine culture showed only a small number

## (undated) NOTE — LETTER
Date & Time: 5/11/2018, 7:54 AM  Patient: Job Johnson  Encounter Provider(s):    Carol Sky MD       To Whom It May Concern: Celi Bee was seen and treated in our department on 5/11/2018. She is able to return to work on 5/14/2018.     If you ha

## (undated) NOTE — LETTER
6/8/2022          To Whom It May Concern: Shine Hicks is currently under my medical care and may return to work June 13,2022    If you require additional information please contact our office.         Sincerely,    Anton Millan MD

## (undated) NOTE — LETTER
July 31, 2020     [place] [address]      Dear Cecile Jang:    Below are the results from your recent visit:    Resulted Orders   [facility] ROXANA 2D+3D SCREENING BILAT ([id number]=[id number])    Narrative      FINDINGS: There is no suspicio

## (undated) NOTE — LETTER
MATEO Notifier: Vimal/[employer]   JERICA Patient Name: Adamaris Balderrama. Identification Number: [id number]      Advance Beneficiary Notice of Noncoverage (ABN)  NOTE:  If Medicare doesn’t pay for D. Item/service(s) below, you may have to pay.   Medicare does to see if Medicare would pay. H. Additional Information: This notice gives our opinion, not an official Medicare decision. If you have other questions on this notice or Medicare billing, call 1-800-MEDICARE (8-778.147.4414/HFQ: [phone]).   Julia

## (undated) NOTE — LETTER
[facility] ULTRASOUND - [facility]ENTER FOR [facility]  [facility] [address] [facility] [address]  [phone]  [phone]  Authorization for Imaging Procedure  Date of Procedure: 10/14/2024    I hereby authorize Dr. LUCIANO BENTON, my physician and his/her assistants (if applicable), which may include medical students, residents, and/or fellows, to perform the following procedure and administer such anesthesia as may be determined necessary by my physician: US BREAST BIOPSY 1 SITE RIGHT (EXY=72457) on Jeanette Frausto.   2.  I recognize that during the procedure, unforeseen conditions may necessitate additional or different procedures than those listed above. I, therefore, further authorize and request that the above-named physician, assistants, or designees perform such procedures as are, in their judgment, necessary and desirable.    3.  My physician has discussed prior to my procedure the potential benefits, risks and side effects of this procedure; the likelihood of achieving goals; and potential problems that might occur during recuperation. They also discussed reasonable alternatives to the procedure, including risks, benefits, and side effects related to the alternatives and risks related to not receiving this procedure. I have had all my questions answered and I acknowledge that no guarantee has been made as to the result that may be obtained.    4.  Should the need arise during my procedure, which includes change of level of care prior to discharge, I also consent to the administration of blood and/or blood products. Further, I understand that despite careful testing and screening of blood or blood products by collecting agencies, I may still be subject to ill effects as a result of receiving a blood transfusion and/or blood products. The following are some, but not all, of the potential risks that can occur: fever and allergic reactions, hemolytic reactions, transmission of diseases such as Hepatitis,  AIDS and Cytomegalovirus (CMV) and fluid overload. In the event that I wish to have an autologous transfusion of my own blood, or a directed donor transfusion, I will discuss this with my physician.  Check only if Refusing Blood or Blood Products  I understand refusal of blood or blood products as deemed necessary by my physician may have serious consequences to my condition to include possible death. I hereby assume responsibility for my refusal and release the hospital, its personnel, and my physicians from any responsibility for the consequences of my refusal.   [  ] Patient Refuses Blood      5.  I authorize the use of any specimen, organs, tissues, body parts or foreign objects that may be removed from my body during the procedure for diagnosis, research or teaching purposes and their subsequent disposal by hospital authorities. I also authorize the release of specimen test results and/or written reports to my treating physician on the hospital medical staff or other referring or consulting physicians involved in my care, at the discretion of the Pathologist or my treating physician.    6.  I consent to the photographing or videotaping of the procedures to be performed, including appropriate portions of my body for medical, scientific, or educational purposes, provided my identity is not revealed by the pictures or by descriptive texts accompanying them. If the procedure has been photographed/videotaped, the physician will obtain the original picture, image, videotape or CD. The hospital will not be responsible for storage, release or maintenance of the picture, image, tape or CD.   7.  I consent to the presence of a  or observers in the operating room as deemed necessary by my physician or their designees.    8.  I recognize that in the event my procedure results in extended X-Ray/fluoroscopy time, I may develop a skin reaction.    9.  If I have a Do Not Attempt Resuscitation (DNAR) order in  place, that status will be suspended while in the operating room, procedural suite, and during the recovery period unless otherwise explicitly stated by me (or a person authorized to consent on my behalf). The performing physician or my attending physician will determine when the applicable recovery period ends for purposes of reinstating the DNAR order.  10.  I acknowledge that my physician has explained sedation/analgesia administration to me including the risk and benefits I consent to the administration of sedation/analgesia as may be necessary or desirable in the judgment of my physician.      I CERTIFY THAT I HAVE READ AND FULLY UNDERSTAND THE ABOVE CONSENT FOR THE PROCEDURE.   Signature of Patient: _____________________________________________________________  Responsible person in case of minor, unconscious: ____________________________________  Relationship to patient:  __________________________________________________________  Signature of Witness: _______________________________Date: _________Time: __________    Statement of Physician: My signature below affirms that prior to the time of the procedure, I have explained to the patient and/or her guardian, the risks and benefits involved in the proposed treatment and any reasonable alternative to the proposed treatment. I have also explained the risks and benefits involved in the refusal of the proposed treatment and have answered the patient's questions. If I have a significant financial interest in a co-management agreement or a significant financial interest in any product or implant, or other significant relationship used in the procedure/surgery, I have disclosed this and had a discussion with my patient.  Signature of Physician:   _________________________________Date:_____________Time:________    Patient Name: Jeanette Potterby : 1940  Printed: 2024   Medical Record #: [id number]

## (undated) NOTE — LETTER
21      Patient: Clay Boss  : [birth year] Visit date: 2021    Dear Volanda Paget,      I examined your patient in consultation today. She has a left small finger middle phalanx neck fracture.   We will treat with splinting, then th

## (undated) NOTE — LETTER
May 19, 2018     [place] [address]      Dear Sallie Colbert:    Below are the results from your recent visit:    Resulted Orders   NATALY SCREENING BILAT (CPT=77067)    Narrative    PROCEDURE: NATALY SCREENING BILAT (CPT=77067)     COM CANCER. A CLINICALLY SUSPICIOUS PALPABLE LUMP SHOULD BE BIOPSIED. For patients over the age of 36, the target due date for the patient's next mammogram has been entered into a reminder system.        Patient received a discharge summary from the tech

## (undated) NOTE — LETTER
[facility] ULTRASOUND - CENTER FOR [facility]  [address] [facility] [address]  [phone]  [phone]  Authorization for Imaging Procedure  Date of Procedure:     I hereby authorize Dr. Dickinson , my physician and his/her assistants (if applicable), which may include medical students, residents, and/or fellows, to perform the following procedure and administer such anesthesia as may be determined necessary by my physician: ULTRASOUND GUIDED RIGHT  BREAST BIOPSY WITH CLIP PLACEMENT  on Jeanette Frausto.   2.  I recognize that during the procedure, unforeseen conditions may necessitate additional or different procedures than those listed above. I, therefore, further authorize and request that the above-named physician, assistants, or designees perform such procedures as are, in their judgment, necessary and desirable.    3.  My physician has discussed prior to my procedure the potential benefits, risks and side effects of this procedure; the likelihood of achieving goals; and potential problems that might occur during recuperation. They also discussed reasonable alternatives to the procedure, including risks, benefits, and side effects related to the alternatives and risks related to not receiving this procedure. I have had all my questions answered and I acknowledge that no guarantee has been made as to the result that may be obtained.    4.  Should the need arise during my procedure, which includes change of level of care prior to discharge, I also consent to the administration of blood and/or blood products. Further, I understand that despite careful testing and screening of blood or blood products by collecting agencies, I may still be subject to ill effects as a result of receiving a blood transfusion and/or blood products. The following are some, but not all, of the potential risks that can occur: fever and allergic reactions, hemolytic reactions, transmission of diseases such as  Hepatitis, AIDS and Cytomegalovirus (CMV) and fluid overload. In the event that I wish to have an autologous transfusion of my own blood, or a directed donor transfusion, I will discuss this with my physician.  Check only if Refusing Blood or Blood Products  I understand refusal of blood or blood products as deemed necessary by my physician may have serious consequences to my condition to include possible death. I hereby assume responsibility for my refusal and release the hospital, its personnel, and my physicians from any responsibility for the consequences of my refusal.   [  ] Patient Refuses Blood      5.  I authorize the use of any specimen, organs, tissues, body parts or foreign objects that may be removed from my body during the procedure for diagnosis, research or teaching purposes and their subsequent disposal by hospital authorities. I also authorize the release of specimen test results and/or written reports to my treating physician on the hospital medical staff or other referring or consulting physicians involved in my care, at the discretion of the Pathologist or my treating physician.    6.  I consent to the photographing or videotaping of the procedures to be performed, including appropriate portions of my body for medical, scientific, or educational purposes, provided my identity is not revealed by the pictures or by descriptive texts accompanying them. If the procedure has been photographed/videotaped, the physician will obtain the original picture, image, videotape or CD. The hospital will not be responsible for storage, release or maintenance of the picture, image, tape or CD.   7.  I consent to the presence of a  or observers in the operating room as deemed necessary by my physician or their designees.    8.  I recognize that in the event my procedure results in extended X-Ray/fluoroscopy time, I may develop a skin reaction.    9.  If I have a Do Not Attempt Resuscitation  (DNAR) order in place, that status will be suspended while in the operating room, procedural suite, and during the recovery period unless otherwise explicitly stated by me (or a person authorized to consent on my behalf). The performing physician or my attending physician will determine when the applicable recovery period ends for purposes of reinstating the DNAR order.  10.  I acknowledge that my physician has explained sedation/analgesia administration to me including the risk and benefits I consent to the administration of sedation/analgesia as may be necessary or desirable in the judgment of my physician.      I CERTIFY THAT I HAVE READ AND FULLY UNDERSTAND THE ABOVE CONSENT FOR THE PROCEDURE.   Signature of Patient: _____________________________________________________________  Responsible person in case of minor, unconscious: ____________________________________  Relationship to patient:  __________________________________________________________  Signature of Witness: _______________________________Date: _________Time: __________    Statement of Physician: My signature below affirms that prior to the time of the procedure, I have explained to the patient and/or her guardian, the risks and benefits involved in the proposed treatment and any reasonable alternative to the proposed treatment. I have also explained the risks and benefits involved in the refusal of the proposed treatment and have answered the patient's questions. If I have a significant financial interest in a co-management agreement or a significant financial interest in any product or implant, or other significant relationship used in the procedure/surgery, I have disclosed this and had a discussion with my patient.  Signature of Physician:   _________________________________Date:_____________Time:________    Patient Name: Jeanette CUELLAR Jett : 1940  Printed: 2024   Medical Record #: [id number]

## (undated) NOTE — LETTER
May 19, 2018     [facility] [address]      Dear Patricio Angela:    Below are the results from your recent visit:    Resulted Orders   XR DEXA BONE DENSITOMETRY (CPT=77080)    Narrative    PROCEDURE: XR DEXA BONE DENSITOMETRY (CPT=77 hip fracture.               Dictated by (CST): Luba Santillan MD on 5/17/2018 at 16:18       Approved by (CST): Jyothi Sharma MD on 5/17/2018 at 16:21             The bone density study shows osteopenia in the left hip and more significan

## (undated) NOTE — ED AVS SNAPSHOT
Alfreda Shane   MRN: [id number]    Department:  [facility]   Date of Visit:  6/26/2019           Disclosure     Insurance plans vary and the physician(s) referred by the ER may not be covered by your plan.  Please contact you within the next three months to obtain basic health screening including reassessment of your blood pressure.     IF THERE IS ANY CHANGE OR WORSENING OF YOUR CONDITION, CALL YOUR PRIMARY CARE PHYSICIAN AT ONCE OR RETURN IMMEDIATELY TO THE EMERGENCY DEPARTMEN

## (undated) NOTE — LETTER
1/8/2018          To Whom It May Concern: Paulina Duque is currently under my medical care and may not return to work at this time. Please excuse Jaime Olson for 1 weeks. She may return to work on Friday January 12.     Activity is restricted as follow

## (undated) NOTE — LETTER
July 19, 2020     [place] [address]      Dear Evangelina Pulido:    Below are the results from your recent visit:    Resulted Orders   COMP METABOLIC PANEL (14)   Result Value Ref Range    Glucose 88 70 - 99 mg/dL    Sodium 141 136 Eosinophil Absolute 0.42 0.00 - 0.70 x10(3) uL    Basophil Absolute 0.03 0.00 - 0.20 x10(3) uL    Immature Granulocyte Absolute 0.02 0.00 - 1.00 x10(3) uL    Neutrophil % 58.7 %    Lymphocyte % 26.2 %    Monocyte % 8.7 %    Eosinophil % 5.7 %    Basophil